# Patient Record
Sex: MALE | Race: WHITE | NOT HISPANIC OR LATINO | Employment: FULL TIME | URBAN - METROPOLITAN AREA
[De-identification: names, ages, dates, MRNs, and addresses within clinical notes are randomized per-mention and may not be internally consistent; named-entity substitution may affect disease eponyms.]

---

## 2017-02-18 ENCOUNTER — ALLSCRIPTS OFFICE VISIT (OUTPATIENT)
Dept: OTHER | Facility: OTHER | Age: 62
End: 2017-02-18

## 2018-01-13 VITALS
HEART RATE: 74 BPM | SYSTOLIC BLOOD PRESSURE: 152 MMHG | TEMPERATURE: 98.4 F | WEIGHT: 252.5 LBS | BODY MASS INDEX: 35.35 KG/M2 | OXYGEN SATURATION: 95 % | HEIGHT: 71 IN | DIASTOLIC BLOOD PRESSURE: 82 MMHG

## 2018-06-15 ENCOUNTER — TELEPHONE (OUTPATIENT)
Dept: FAMILY MEDICINE CLINIC | Facility: CLINIC | Age: 63
End: 2018-06-15

## 2018-06-15 DIAGNOSIS — I10 ESSENTIAL HYPERTENSION: Primary | ICD-10-CM

## 2018-06-15 RX ORDER — VALSARTAN AND HYDROCHLOROTHIAZIDE 320; 25 MG/1; MG/1
TABLET, FILM COATED ORAL
Qty: 90 TABLET | Refills: 2 | Status: SHIPPED | OUTPATIENT
Start: 2018-06-15 | End: 2018-10-03 | Stop reason: DRUGHIGH

## 2018-06-15 NOTE — TELEPHONE ENCOUNTER
MARIANGEL HAS NOT BEEN SEEN IN OVER 1 YEAR, PHARMACY IS REQUESTING VALSARTAN-HCTZ [phone] 1 DAILY   DO YOU WANT TO FILL, SEND TO [facility]

## 2018-07-16 ENCOUNTER — OFFICE VISIT (OUTPATIENT)
Dept: FAMILY MEDICINE CLINIC | Facility: CLINIC | Age: 63
End: 2018-07-16
Payer: COMMERCIAL

## 2018-07-16 VITALS
DIASTOLIC BLOOD PRESSURE: 80 MMHG | WEIGHT: 252 LBS | HEIGHT: 70 IN | TEMPERATURE: 99.5 F | SYSTOLIC BLOOD PRESSURE: 120 MMHG | BODY MASS INDEX: 36.08 KG/M2 | OXYGEN SATURATION: 93 % | HEART RATE: 70 BPM

## 2018-07-16 DIAGNOSIS — J20.8 ACUTE BRONCHITIS DUE TO OTHER SPECIFIED ORGANISMS: ICD-10-CM

## 2018-07-16 DIAGNOSIS — J01.00 ACUTE NON-RECURRENT MAXILLARY SINUSITIS: Primary | ICD-10-CM

## 2018-07-16 PROCEDURE — 3008F BODY MASS INDEX DOCD: CPT | Performed by: FAMILY MEDICINE

## 2018-07-16 PROCEDURE — 99214 OFFICE O/P EST MOD 30 MIN: CPT | Performed by: FAMILY MEDICINE

## 2018-07-16 RX ORDER — IBUPROFEN 200 MG
TABLET ORAL EVERY 6 HOURS PRN
COMMUNITY
End: 2018-10-22 | Stop reason: ALTCHOICE

## 2018-07-16 RX ORDER — PROMETHAZINE HYDROCHLORIDE AND CODEINE PHOSPHATE 6.25; 1 MG/5ML; MG/5ML
5 SYRUP ORAL EVERY 4 HOURS PRN
Qty: 180 ML | Refills: 0 | Status: SHIPPED | OUTPATIENT
Start: 2018-07-16 | End: 2018-10-22 | Stop reason: ALTCHOICE

## 2018-07-16 RX ORDER — PROMETHAZINE HYDROCHLORIDE AND CODEINE PHOSPHATE 6.25; 1 MG/5ML; MG/5ML
5 SYRUP ORAL 4 TIMES DAILY PRN
COMMUNITY
Start: 2017-02-18 | End: 2018-07-16 | Stop reason: SDUPTHER

## 2018-07-16 RX ORDER — PREDNISONE 10 MG/1
TABLET ORAL
COMMUNITY
Start: 2017-02-18 | End: 2018-07-16

## 2018-07-16 RX ORDER — AMOXICILLIN AND CLAVULANATE POTASSIUM 875; 125 MG/1; MG/1
1 TABLET, FILM COATED ORAL 2 TIMES DAILY
COMMUNITY
Start: 2017-02-18 | End: 2018-07-16

## 2018-07-16 RX ORDER — LEVOFLOXACIN 500 MG/1
500 TABLET, FILM COATED ORAL EVERY 24 HOURS
Qty: 10 TABLET | Refills: 0 | Status: SHIPPED | OUTPATIENT
Start: 2018-07-16 | End: 2018-07-26

## 2018-07-16 NOTE — PATIENT INSTRUCTIONS
Acute Bronchitis   WHAT YOU NEED TO KNOW:   Acute bronchitis is swelling and irritation in the air passages of your lungs  This irritation may cause you to cough or have other breathing problems  Acute bronchitis often starts because of another illness, such as a cold or the flu  The illness spreads from your nose and throat to your windpipe and airways  Bronchitis is often called a chest cold  Acute bronchitis lasts about 3 to 6 weeks and is usually not a serious illness  Your cough can last for several weeks  DISCHARGE INSTRUCTIONS:   Return to the emergency department if:   · You cough up blood  · Your lips or fingernails turn blue  · You feel like you are not getting enough air when you breathe  Contact your healthcare provider if:   · You have a fever  · Your breathing problems do not go away or get worse  · Your cough does not get better within 4 weeks  · You have questions or concerns about your condition or care  Self-care:   · Get more rest   Rest helps your body to heal  Slowly start to do more each day  Rest when you feel it is needed  · Avoid irritants in the air  Avoid chemicals, fumes, and dust  Wear a face mask if you must work around dust or fumes  Stay inside on days when air pollution levels are high  If you have allergies, stay inside when pollen counts are high  Do not use aerosol products, such as spray-on deodorant, bug spray, and hair spray  · Do not smoke or be around others who smoke  Nicotine and other chemicals in cigarettes and cigars damages the cilia that move mucus out of your lungs  Ask your healthcare provider for information if you currently smoke and need help to quit  E-cigarettes or smokeless tobacco still contain nicotine  Talk to your healthcare provider before you use these products  · Drink liquids as directed  Liquids help keep your air passages moist and help you cough up mucus   You may need to drink more liquids when you have acute bronchitis  Ask how much liquid to drink each day and which liquids are best for you  · Use a humidifier or vaporizer  Use a cool mist humidifier or a vaporizer to increase air moisture in your home  This may make it easier for you to breathe and help decrease your cough  Decrease risk for acute bronchitis:   · Get the vaccinations you need  Ask your healthcare provider if you should get vaccinated against the flu or pneumonia  · Prevent the spread of germs  You can decrease your risk of acute bronchitis and other illnesses by doing the following:     [facility] your hands often with soap and water  Carry germ-killing hand lotion or gel with you  You can use the lotion or gel to clean your hands when soap and water are not available  ¨ Do not touch your eyes, nose, or mouth unless you have washed your hands first     ¨ Always cover your mouth when you cough to prevent the spread of germs  It is best to cough into a tissue or your shirt sleeve instead of into your hand  Ask those around you cover their mouths when they cough  ¨ Try to avoid people who have a cold or the flu  If you are sick, stay away from others as much as possible  Medicines: Your healthcare provider may  give you any of the following:  · Ibuprofen or acetaminophen  are medicines that help lower your fever  They are available without a doctor's order  Ask your healthcare provider which medicine is right for you  Ask how much to take and how often to take it  Follow directions  These medicines can cause stomach bleeding if not taken correctly  Ibuprofen can cause kidney damage  Do not take ibuprofen if you have kidney disease, an ulcer, or allergies to aspirin  Acetaminophen can cause liver damage  Do not take more than 4,000 milligrams in 24 hours  · Decongestants  help loosen mucus in your lungs and make it easier to cough up  This can help you breathe easier  · Cough suppressants  decrease your urge to cough   If your cough produces mucus, do not take a cough suppressant unless your healthcare provider tells you to  Your healthcare provider may suggest that you take a cough suppressant at night so you can rest     · Inhalers  may be given  Your healthcare provider may give you one or more inhalers to help you breathe easier and cough less  An inhaler gives your medicine to open your airways  Ask your healthcare provider to show you how to use your inhaler correctly  · Take your medicine as directed  Contact your healthcare provider if you think your medicine is not helping or if you have side effects  Tell him of her if you are allergic to any medicine  Keep a list of the medicines, vitamins, and herbs you take  Include the amounts, and when and why you take them  Bring the list or the pill bottles to follow-up visits  Carry your medicine list with you in case of an emergency  Follow up with your healthcare provider as directed:  Write down questions you have so you will remember to ask them during your follow-up visits  © 2017 [employer] Information is for End User's use only and may not be sold, redistributed or otherwise used for commercial purposes  All illustrations and images included in CareNotes® are the copyrighted property of [employer]  or Bradley Sandhu  The above information is an  only  It is not intended as medical advice for individual conditions or treatments  Talk to your doctor, nurse or pharmacist before following any medical regimen to see if it is safe and effective for you

## 2018-07-16 NOTE — PROGRESS NOTES
Assessment/Plan:    No problem-specific Assessment & Plan notes found for this encounter  Diagnoses and all orders for this visit:    Acute non-recurrent maxillary sinusitis  Acute bronchitis due to other specified organisms  -     levofloxacin (LEVAQUIN) 500 mg tablet; Take 1 tablet (500 mg total) by mouth every 24 hours for 10 days  -     promethazine-codeine (PHENERGAN WITH CODEINE) 6 25-10 mg/5 mL syrup; Take 5 mL by mouth every 4 (four) hours as needed for cough    At this point the patient has symptoms of both bronchitis and sinusitis and initially I did think I heard some rales at the right lung base  The seem to clear as he took further breaths but given the persistent fevers and cough I am going to treat him with an antibiotic that would cover pneumonia  If his symptoms persist beyond another day or 2 I would want to get a chest x-ray  I did prescribe cough syrup for him as well  Subjective:   Chief Complaint   Patient presents with    Cold Like Symptoms     pt c/o cough and fever since Friday        Patient ID: Nita Boeck is a 58 y o  male      The pt is here because he has been sick for 5 days  Had a high fever Thursday night  Was not coughing much at that time  Woke around 2am and his fever had broke - he was sweating, the bed was soaking wet  Felt pretty good the next day until the cough started  Had a fever Friday night as well   He has a little sinus pain/pressure  The right ear is crackling and ringing at times  + cough, some in fits  minimal nasal congestion  + headaches  + PND  he feels hoarse but not really sore throat, nothing major  He feels it is mostly because of coughing  He is SOB when he coughs  Abd muscles hurt he has been coughing so much              The following portions of the patient's history were reviewed and updated as appropriate: allergies, current medications, past family history, past medical history, past social history, past surgical history and problem list     Review of Systems      Objective:  Vitals:    07/16/18 1546   BP: 120/80   Pulse: 70   Temp: 99 5 °F (37 5 °C)   SpO2: 93%   Weight: 114 kg (252 lb)   Height: 5' 10" (1 778 m)      Physical Exam   Constitutional: He is oriented to person, place, and time  He appears well-developed and well-nourished  No distress  HENT:   Head: Normocephalic and atraumatic  Right Ear: External ear normal    Left Ear: External ear normal    Mouth/Throat: Oropharynx is clear and moist  No oropharyngeal exudate  Eyes: Conjunctivae are normal    Neck: Normal range of motion  Neck supple  Cardiovascular: Normal rate  Pulmonary/Chest: Effort normal    Lymphadenopathy:     He has cervical adenopathy  Neurological: He is alert and oriented to person, place, and time  Skin: Skin is warm and dry  No rash noted  He is not diaphoretic  No erythema  Psychiatric: He has a normal mood and affect

## 2018-10-03 ENCOUNTER — OFFICE VISIT (OUTPATIENT)
Dept: FAMILY MEDICINE CLINIC | Facility: CLINIC | Age: 63
End: 2018-10-03
Payer: COMMERCIAL

## 2018-10-03 VITALS
DIASTOLIC BLOOD PRESSURE: 78 MMHG | WEIGHT: 236 LBS | HEART RATE: 73 BPM | BODY MASS INDEX: 33.79 KG/M2 | OXYGEN SATURATION: 96 % | SYSTOLIC BLOOD PRESSURE: 128 MMHG | TEMPERATURE: 97.9 F | HEIGHT: 70 IN

## 2018-10-03 DIAGNOSIS — Z11.59 NEED FOR HEPATITIS C SCREENING TEST: ICD-10-CM

## 2018-10-03 DIAGNOSIS — Z12.5 PROSTATE CANCER SCREENING: ICD-10-CM

## 2018-10-03 DIAGNOSIS — I10 ESSENTIAL HYPERTENSION: Primary | ICD-10-CM

## 2018-10-03 DIAGNOSIS — Z12.11 SCREENING FOR COLON CANCER: ICD-10-CM

## 2018-10-03 DIAGNOSIS — E78.2 MIXED HYPERLIPIDEMIA: ICD-10-CM

## 2018-10-03 PROCEDURE — 99214 OFFICE O/P EST MOD 30 MIN: CPT | Performed by: FAMILY MEDICINE

## 2018-10-03 RX ORDER — OLMESARTAN MEDOXOMIL AND HYDROCHLOROTHIAZIDE 40/25 40; 25 MG/1; MG/1
1 TABLET ORAL DAILY
Qty: 90 TABLET | Refills: 1 | Status: SHIPPED | OUTPATIENT
Start: 2018-10-03 | End: 2018-10-22 | Stop reason: ALTCHOICE

## 2018-10-03 NOTE — PATIENT INSTRUCTIONS

## 2018-10-03 NOTE — PROGRESS NOTES
Subjective:   Chief Complaint   Patient presents with    Blood Pressure Check     pt here for f/u on blood pressure, pt also c/o ringing in his rt ear        Patient ID: Karolyn Cleaning is a 58 y o  male  Patient is here for follow-up regarding his blood pressure  He has missed his dose last 2 days  Normally his blood pressure is under good control  He has been following in no brat diet and has lost 16 lb  He had some questions about previous exposure to a partner with hepatitis C  The following portions of the patient's history were reviewed and updated as appropriate: allergies, current medications, past family history, past medical history, past social history, past surgical history and problem list     Review of Systems   Constitutional: Negative for activity change, appetite change, chills, diaphoresis, fatigue and unexpected weight change  HENT: Positive for tinnitus  Negative for congestion, ear discharge, ear pain, hearing loss, nosebleeds and rhinorrhea  Eyes: Negative for pain, redness, itching and visual disturbance  Respiratory: Negative for cough, choking, chest tightness and shortness of breath  Cardiovascular: Negative for chest pain and leg swelling  Gastrointestinal: Negative for abdominal pain, blood in stool, constipation, diarrhea and nausea  Endocrine: Negative for cold intolerance, polydipsia and polyphagia  Genitourinary: Negative for dysuria, frequency, hematuria and urgency  Musculoskeletal: Positive for arthralgias  Negative for back pain, gait problem, joint swelling, neck pain and neck stiffness  Right knee pain   Skin: Negative for color change and rash  Allergic/Immunologic: Negative for environmental allergies and food allergies  Neurological: Negative for dizziness, tremors, seizures, speech difficulty, numbness and headaches  Hematological: Negative for adenopathy  Does not bruise/bleed easily     Psychiatric/Behavioral: Negative for behavioral problems, dysphoric mood, hallucinations and self-injury  Objective:  Vitals:    10/03/18 1030   BP: 150/100   Pulse: 73   Temp: 97 9 °F (36 6 °C)   SpO2: 96%   Weight: 107 kg (236 lb)   Height: 5' 10" (1 778 m)      Physical Exam   Constitutional: He is oriented to person, place, and time  He appears well-developed and well-nourished  No distress  HENT:   Head: Normocephalic and atraumatic  Right Ear: External ear normal    Left Ear: External ear normal    Nose: Nose normal    Mouth/Throat: Oropharynx is clear and moist  No oropharyngeal exudate  Eyes: Pupils are equal, round, and reactive to light  Conjunctivae and EOM are normal  Right eye exhibits no discharge  Left eye exhibits no discharge  No scleral icterus  Neck: Normal range of motion  Neck supple  No thyromegaly present  Cardiovascular: Normal rate, regular rhythm and normal heart sounds  No murmur heard  Pulmonary/Chest: Effort normal and breath sounds normal  He has no wheezes  He has no rales  Abdominal: Soft  Bowel sounds are normal  He exhibits no mass  There is no tenderness  There is no guarding  Musculoskeletal: Normal range of motion  He exhibits no edema or tenderness  Lymphadenopathy:     He has no cervical adenopathy  Neurological: He is alert and oriented to person, place, and time  He has normal reflexes  Skin: Skin is warm and dry  He is not diaphoretic  Psychiatric: He has a normal mood and affect  Judgment and thought content normal          Assessment/Plan:    No problem-specific Assessment & Plan notes found for this encounter  Diagnoses and all orders for this visit:    Essential hypertension  -     CBC; Future  -     Comprehensive metabolic panel; Future  -     TSH, 3rd generation with Free T4 reflex; Future  -     CBC  -     Comprehensive metabolic panel  -     TSH, 3rd generation with Free T4 reflex  -     olmesartan-hydrochlorothiazide (BENICAR HCT) 40-25 MG per tablet;  Take 1 tablet by mouth daily    Screening for colon cancer  -     Ambulatory referral to Gastroenterology; Future    Mixed hyperlipidemia  -     Lipid panel; Future  -     Lipid panel    Prostate cancer screening  -     PSA Total, Diagnostic; Future  -     PSA Total, Diagnostic    Need for hepatitis C screening test  -     Hepatitis C antibody; Future  -     Hepatitis C antibody         I have encouraged the patient to change his blood pressure medicine to Benicar HC T  We will send a new prescription in for him  In the meantime he will get the lab have his blood work to include hepatitis-C screening    Recheck in see me again in 3 months

## 2018-10-22 ENCOUNTER — OFFICE VISIT (OUTPATIENT)
Dept: FAMILY MEDICINE CLINIC | Facility: CLINIC | Age: 63
End: 2018-10-22
Payer: COMMERCIAL

## 2018-10-22 VITALS
HEIGHT: 70 IN | BODY MASS INDEX: 31.42 KG/M2 | TEMPERATURE: 98.7 F | OXYGEN SATURATION: 98 % | DIASTOLIC BLOOD PRESSURE: 64 MMHG | SYSTOLIC BLOOD PRESSURE: 118 MMHG | HEART RATE: 79 BPM | WEIGHT: 219.5 LBS

## 2018-10-22 DIAGNOSIS — E11.9 TYPE 2 DIABETES MELLITUS WITHOUT COMPLICATION, WITHOUT LONG-TERM CURRENT USE OF INSULIN (HCC): ICD-10-CM

## 2018-10-22 DIAGNOSIS — E11.65 UNCONTROLLED TYPE 2 DIABETES MELLITUS WITH HYPERGLYCEMIA (HCC): Primary | ICD-10-CM

## 2018-10-22 DIAGNOSIS — I10 BENIGN ESSENTIAL HYPERTENSION: ICD-10-CM

## 2018-10-22 DIAGNOSIS — R35.89 POLYURIA: ICD-10-CM

## 2018-10-22 DIAGNOSIS — Z13.0 SCREENING FOR IRON DEFICIENCY ANEMIA: ICD-10-CM

## 2018-10-22 DIAGNOSIS — R63.1 POLYDIPSIA: ICD-10-CM

## 2018-10-22 DIAGNOSIS — Z13.29 SCREENING FOR THYROID DISORDER: ICD-10-CM

## 2018-10-22 DIAGNOSIS — Z12.5 PROSTATE CANCER SCREENING: ICD-10-CM

## 2018-10-22 DIAGNOSIS — Z13.220 SCREENING FOR LIPID DISORDERS: ICD-10-CM

## 2018-10-22 DIAGNOSIS — G44.229 CHRONIC TENSION-TYPE HEADACHE, NOT INTRACTABLE: ICD-10-CM

## 2018-10-22 DIAGNOSIS — Z11.59 NEED FOR HEPATITIS C SCREENING TEST: ICD-10-CM

## 2018-10-22 DIAGNOSIS — Z13.29 SCREENING FOR ENDOCRINE DISORDER: ICD-10-CM

## 2018-10-22 LAB — SL AMB POCT HEMOGLOBIN AIC: 12.9

## 2018-10-22 PROCEDURE — 3046F HEMOGLOBIN A1C LEVEL >9.0%: CPT | Performed by: FAMILY MEDICINE

## 2018-10-22 PROCEDURE — 99214 OFFICE O/P EST MOD 30 MIN: CPT | Performed by: FAMILY MEDICINE

## 2018-10-22 PROCEDURE — 83036 HEMOGLOBIN GLYCOSYLATED A1C: CPT | Performed by: FAMILY MEDICINE

## 2018-10-22 RX ORDER — BLOOD-GLUCOSE METER
1 KIT MISCELLANEOUS
Qty: 1 EACH | Refills: 0 | Status: SHIPPED | OUTPATIENT
Start: 2018-10-22

## 2018-10-22 RX ORDER — LANCETS 28 GAUGE
EACH MISCELLANEOUS
Qty: 100 EACH | Refills: 0 | Status: SHIPPED | OUTPATIENT
Start: 2018-10-22 | End: 2020-10-02 | Stop reason: SDUPTHER

## 2018-10-22 RX ORDER — VALSARTAN AND HYDROCHLOROTHIAZIDE 320; 25 MG/1; MG/1
1 TABLET, FILM COATED ORAL DAILY
Qty: 90 TABLET | Refills: 3 | Status: SHIPPED | OUTPATIENT
Start: 2018-10-22 | End: 2018-11-01 | Stop reason: SDUPTHER

## 2018-10-22 RX ORDER — VALSARTAN AND HYDROCHLOROTHIAZIDE 320; 25 MG/1; MG/1
TABLET, FILM COATED ORAL
COMMUNITY
Start: 2018-10-16 | End: 2018-10-22 | Stop reason: SDUPTHER

## 2018-10-22 NOTE — PROGRESS NOTES
Subjective:   Chief Complaint   Patient presents with    possible diabeties     pt c/o being thristy all the time, dizziness, frequent urination that started a month ago, pt states he stopped eatting bread and drinking 3 months ago        Patient ID: Ean Dupont is a 58 y o  male  Pt here complaining of signs of diabetes  Noticed for about 3 months  He looked it up on the internet and he thinks he has diabetes  His father has diabetes  He has increased urination and frequency, with increased thirst and increased appetite but has lost weight  He has blurry vision  His father has a glucometer but he has not checked his blood sugar  Pt has not had blood work checked since 2015  He has cut out bread over the past 3 months and decreased alcohol intake  He has 1 drink a night and can give that up  He also eats chocolate marshmallow ice cream each night  Drinks alcohol frequently, cranberry juice and vodka  The following portions of the patient's history were reviewed and updated as appropriate: allergies, current medications, past family history, past medical history, past social history, past surgical history and problem list     Review of Systems   Constitutional: Positive for appetite change, fatigue and unexpected weight change  Negative for fever  HENT: Negative  Eyes: Positive for visual disturbance  Respiratory: Negative  Negative for cough  Cardiovascular: Negative  Gastrointestinal: Negative  Endocrine: Positive for polydipsia, polyphagia and polyuria  Genitourinary: Negative  Musculoskeletal: Negative  Skin: Negative  Allergic/Immunologic: Negative  Neurological: Positive for headaches  Psychiatric/Behavioral: Negative  Objective:  Vitals:    10/22/18 0847   BP: 118/64   Pulse: 79   Temp: 98 7 °F (37 1 °C)   SpO2: 98%   Weight: 99 6 kg (219 lb 8 oz)   Height: 5' 10" (1 778 m)      Physical Exam   Constitutional: He is oriented to person, place, and time   He appears well-developed and well-nourished  Obese    HENT:   Head: Normocephalic and atraumatic  Cardiovascular: Normal rate, regular rhythm and normal heart sounds  Pulmonary/Chest: Effort normal and breath sounds normal    Abdominal: Soft  Bowel sounds are normal    Neurological: He is alert and oriented to person, place, and time  Skin: Skin is warm and dry  Psychiatric: He has a normal mood and affect  His behavior is normal  Judgment and thought content normal    Nursing note and vitals reviewed  Assessment/Plan:    No problem-specific Assessment & Plan notes found for this encounter  Diagnoses and all orders for this visit:    Uncontrolled type 2 diabetes mellitus with hyperglycemia ([facility] [address] )  -     metFORMIN (GLUCOPHAGE) 500 mg tablet; Take 1 tablet (500 mg total) by mouth 2 (two) times a day with meals  -     glucose monitoring kit (FREESTYLE) monitoring kit; 1 each by Does not apply route 2 (two) times a day before lunch and dinner E11 65  -     Glucometer test strips  -     Lancets (FREESTYLE) lancets; Use as instructed E11 65 testing twice a day  -     POCT hemoglobin A1c  Newly diagnosed DM type 2: hba1c 12 9 in office today  Pt will start metformin 500mg with breakfast and dinner and observe for any changes in bms  Glucose monitor, checking twice a day, before breakfast and before dinner  Discussed dietary modifications and given carb information  Diabetic classes available- discussed, not interested currently, thinks he can do it on his own  Type 2 diabetes mellitus without complication, without long-term current use of insulin ([facility])  -     metFORMIN (GLUCOPHAGE) 500 mg tablet; Take 1 tablet (500 mg total) by mouth 2 (two) times a day with meals  -     POCT hemoglobin A1c  hba1c done in office today 12 9  Polyuria  -     Cancel: HEMOGLOBIN A1C W/ EAG ESTIMATION;  Future  -     Cancel: HEMOGLOBIN A1C W/ EAG ESTIMATION  Likely symptom of uncontrolled diabetes  Polydipsia  Likely symptom of uncontrolled diabetes  Benign essential hypertension  -     valsartan-hydrochlorothiazide (DIOVAN-HCT) 320-25 MG per tablet;  Take 1 tablet by mouth daily  Controlled: continue current medication , requesting to restart medication  Chronic tension-type headache, not intractable  Likely related to dm type 2 since it is a new onset    Routine blood work drawn today in office:   Prostate cancer screening  -     PSA, total and free    Need for hepatitis C screening test  -     Hepatitis C antibody    Screening for iron deficiency anemia  -     CBC and differential    Screening for thyroid disorder  -     TSH, 3rd generation    Screening for endocrine disorder  -     Comprehensive metabolic panel    Screening for lipid disorders  -     Lipid panel    Other orders

## 2018-10-23 ENCOUNTER — TELEPHONE (OUTPATIENT)
Dept: FAMILY MEDICINE CLINIC | Facility: CLINIC | Age: 63
End: 2018-10-23

## 2018-10-23 LAB
ALBUMIN SERPL-MCNC: 4.4 G/DL (ref 3.6–4.8)
ALBUMIN/GLOB SERPL: 1.4 {RATIO} (ref 1.2–2.2)
ALP SERPL-CCNC: 99 IU/L (ref 39–117)
ALT SERPL-CCNC: 36 IU/L (ref 0–44)
AST SERPL-CCNC: 24 IU/L (ref 0–40)
BASOPHILS # BLD AUTO: 0.1 X10E3/UL (ref 0–0.2)
BASOPHILS NFR BLD AUTO: 1 %
BILIRUB SERPL-MCNC: 1.2 MG/DL (ref 0–1.2)
BUN SERPL-MCNC: 23 MG/DL (ref 8–27)
BUN/CREAT SERPL: 18 (ref 10–24)
CALCIUM SERPL-MCNC: 10.3 MG/DL (ref 8.6–10.2)
CHLORIDE SERPL-SCNC: 87 MMOL/L (ref 96–106)
CHOLEST SERPL-MCNC: 193 MG/DL (ref 100–199)
CHOLEST/HDLC SERPL: 5.5 RATIO (ref 0–5)
CO2 SERPL-SCNC: 21 MMOL/L (ref 20–29)
CREAT SERPL-MCNC: 1.29 MG/DL (ref 0.76–1.27)
EOSINOPHIL # BLD AUTO: 0.1 X10E3/UL (ref 0–0.4)
EOSINOPHIL NFR BLD AUTO: 1 %
ERYTHROCYTE [DISTWIDTH] IN BLOOD BY AUTOMATED COUNT: 12.4 % (ref 12.3–15.4)
GLOBULIN SER-MCNC: 3.2 G/DL (ref 1.5–4.5)
GLUCOSE SERPL-MCNC: 592 MG/DL (ref 65–99)
HCT VFR BLD AUTO: 49.4 % (ref 37.5–51)
HCV AB S/CO SERPL IA: <0.1 S/CO RATIO (ref 0–0.9)
HDLC SERPL-MCNC: 35 MG/DL
HGB BLD-MCNC: 17.1 G/DL (ref 13–17.7)
IMM GRANULOCYTES # BLD: 0 X10E3/UL (ref 0–0.1)
IMM GRANULOCYTES NFR BLD: 0 %
LDLC SERPL CALC-MCNC: 95 MG/DL (ref 0–99)
LDLC SERPL DIRECT ASSAY-MCNC: 128 MG/DL (ref 0–99)
LYMPHOCYTES # BLD AUTO: 1.8 X10E3/UL (ref 0.7–3.1)
LYMPHOCYTES NFR BLD AUTO: 20 %
MCH RBC QN AUTO: 32.4 PG (ref 26.6–33)
MCHC RBC AUTO-ENTMCNC: 34.6 G/DL (ref 31.5–35.7)
MCV RBC AUTO: 94 FL (ref 79–97)
MONOCYTES # BLD AUTO: 0.5 X10E3/UL (ref 0.1–0.9)
MONOCYTES NFR BLD AUTO: 5 %
NEUTROPHILS # BLD AUTO: 6.5 X10E3/UL (ref 1.4–7)
NEUTROPHILS NFR BLD AUTO: 73 %
PLATELET # BLD AUTO: 169 X10E3/UL (ref 150–379)
POTASSIUM SERPL-SCNC: 5.1 MMOL/L (ref 3.5–5.2)
PROT SERPL-MCNC: 7.6 G/DL (ref 6–8.5)
PSA FREE MFR SERPL: 30.4 %
PSA FREE SERPL-MCNC: 0.85 NG/ML
PSA SERPL-MCNC: 2.8 NG/ML (ref 0–4)
RBC # BLD AUTO: 5.28 X10E6/UL (ref 4.14–5.8)
SL AMB EGFR AFRICAN AMERICAN: 67 ML/MIN/1.73
SL AMB EGFR NON AFRICAN AMERICAN: 58 ML/MIN/1.73
SL AMB VLDL CHOLESTEROL CALC: 63 MG/DL (ref 5–40)
SODIUM SERPL-SCNC: 132 MMOL/L (ref 134–144)
TRIGL SERPL-MCNC: 317 MG/DL (ref 0–149)
TSH SERPL DL<=0.005 MIU/L-ACNC: 1.71 UIU/ML (ref 0.45–4.5)
WBC # BLD AUTO: 8.9 X10E3/UL (ref 3.4–10.8)

## 2018-10-23 NOTE — TELEPHONE ENCOUNTER
Start metformin 500mg twice a day and continue to monitor  Watch diet as discussed in office  It has been running high for at least the past 3 months  Will increase on metformin if he is tolerating the medication and if blood sugars are still running high  Call back on Friday am with numbers- check fasting= before breakfast  And before dinner

## 2018-10-23 NOTE — TELEPHONE ENCOUNTER
Catrachita Cheng said he never called last night with that BS reading, he did not have his monitor yet

## 2018-10-23 NOTE — TELEPHONE ENCOUNTER
DR Rachna Del Toro RECEIVED A CALL FROM DEANGELO BRUNSON LAST NIGHT  HIS BS   WANTED TO ALERT YOU TO THIS TO SEE WHAT YOU WANTED TO DO WITH HIM  SHE WILL ALSO SEND YOU A TASK

## 2018-10-23 NOTE — TELEPHONE ENCOUNTER
Received a call from [facility] this am at 7:20 am with blood sugar 595  Report is being faxed to the office

## 2018-10-27 NOTE — TELEPHONE ENCOUNTER
Yes, franchesca handled this  The blood sugar was from the labs drawn in the office at his office visit  Pt is aware of his instructions

## 2018-10-28 ENCOUNTER — TELEPHONE (OUTPATIENT)
Dept: FAMILY MEDICINE CLINIC | Facility: CLINIC | Age: 63
End: 2018-10-28

## 2018-10-29 DIAGNOSIS — E11.65 UNCONTROLLED TYPE 2 DIABETES MELLITUS WITH HYPERGLYCEMIA (HCC): ICD-10-CM

## 2018-10-29 DIAGNOSIS — E11.9 TYPE 2 DIABETES MELLITUS WITHOUT COMPLICATION, WITHOUT LONG-TERM CURRENT USE OF INSULIN (HCC): Primary | ICD-10-CM

## 2018-10-29 DIAGNOSIS — E11.9 TYPE 2 DIABETES MELLITUS WITHOUT COMPLICATION, WITHOUT LONG-TERM CURRENT USE OF INSULIN (HCC): ICD-10-CM

## 2018-10-29 PROBLEM — Z13.220 SCREENING FOR LIPID DISORDERS: Status: RESOLVED | Noted: 2018-10-22 | Resolved: 2018-10-29

## 2018-10-29 PROBLEM — Z13.29 SCREENING FOR ENDOCRINE DISORDER: Status: RESOLVED | Noted: 2018-10-22 | Resolved: 2018-10-29

## 2018-10-29 PROBLEM — Z12.5 PROSTATE CANCER SCREENING: Status: RESOLVED | Noted: 2018-10-22 | Resolved: 2018-10-29

## 2018-10-29 PROBLEM — Z13.0 SCREENING FOR IRON DEFICIENCY ANEMIA: Status: RESOLVED | Noted: 2018-10-22 | Resolved: 2018-10-29

## 2018-10-29 PROBLEM — Z11.59 NEED FOR HEPATITIS C SCREENING TEST: Status: RESOLVED | Noted: 2018-10-22 | Resolved: 2018-10-29

## 2018-10-29 PROBLEM — Z13.29 SCREENING FOR THYROID DISORDER: Status: RESOLVED | Noted: 2018-10-22 | Resolved: 2018-10-29

## 2018-10-29 NOTE — TELEPHONE ENCOUNTER
CAME IN LAST TUESDAY WAS DIAGNOSED WITH DM TYPE 2   AARON SUGAR  RIGHT NOW, WAS TOLD TO CALL IF PAST 5

## 2018-10-29 NOTE — TELEPHONE ENCOUNTER
MARIANGEL SPOKE WITH ON CALL DOC YESTERDAY  BS   SHE RECOMMENDED HE TAKE TWO TABLETS IN THE AM AND TWO TABLETS IN THE PM OF HIS MEDICATION  HE DID NOT CHECK HIS BS THIS AM BUT FEELS GREAT AND IS NOT HAVING ANY SIDE EFFECTS ON THE MEDS  IS THERE A BETTER PILL OR SHOULD HE STAY ON THIS SINCE HE IS TOLERATING IT SO WELL  IS HE ABLE TO GET A QUICK PEN THAT HE CAN USE WHILE TRAVELING AS HE WAS UP IN THE [place] AND DID NOT HAVE HIS MACHINE WITH HIM TO CHECK THIS AM     ALSO WILL NEED A RENEWAL ON HIS MEDICATION SINCE HE DOUBLED IT  I PUT THROUGH IN EPIC AS 1000MG BID, PLEASE CHECK  ADVISE PATIENT

## 2018-10-30 RX ORDER — BLOOD-GLUCOSE METER
KIT MISCELLANEOUS 2 TIMES DAILY
Refills: 0 | COMMUNITY
Start: 2018-10-22 | End: 2018-12-06 | Stop reason: SDUPTHER

## 2018-10-30 RX ORDER — BLOOD-GLUCOSE METER
KIT MISCELLANEOUS
Refills: 0 | COMMUNITY
Start: 2018-10-22 | End: 2020-10-02 | Stop reason: SDUPTHER

## 2018-11-01 ENCOUNTER — OFFICE VISIT (OUTPATIENT)
Dept: FAMILY MEDICINE CLINIC | Facility: CLINIC | Age: 63
End: 2018-11-01
Payer: COMMERCIAL

## 2018-11-01 VITALS
TEMPERATURE: 98.2 F | DIASTOLIC BLOOD PRESSURE: 50 MMHG | WEIGHT: 220 LBS | HEIGHT: 70 IN | SYSTOLIC BLOOD PRESSURE: 90 MMHG | OXYGEN SATURATION: 97 % | HEART RATE: 87 BPM | BODY MASS INDEX: 31.5 KG/M2

## 2018-11-01 DIAGNOSIS — E11.9 TYPE 2 DIABETES MELLITUS WITHOUT COMPLICATION, WITHOUT LONG-TERM CURRENT USE OF INSULIN (HCC): ICD-10-CM

## 2018-11-01 DIAGNOSIS — I10 BENIGN ESSENTIAL HYPERTENSION: ICD-10-CM

## 2018-11-01 DIAGNOSIS — I10 ESSENTIAL HYPERTENSION: ICD-10-CM

## 2018-11-01 DIAGNOSIS — Z12.11 COLON CANCER SCREENING: Primary | ICD-10-CM

## 2018-11-01 DIAGNOSIS — E11.65 UNCONTROLLED TYPE 2 DIABETES MELLITUS WITH HYPERGLYCEMIA (HCC): ICD-10-CM

## 2018-11-01 PROCEDURE — 3078F DIAST BP <80 MM HG: CPT | Performed by: FAMILY MEDICINE

## 2018-11-01 PROCEDURE — 99213 OFFICE O/P EST LOW 20 MIN: CPT | Performed by: FAMILY MEDICINE

## 2018-11-01 PROCEDURE — 3074F SYST BP LT 130 MM HG: CPT | Performed by: FAMILY MEDICINE

## 2018-11-01 RX ORDER — OLMESARTAN MEDOXOMIL AND HYDROCHLOROTHIAZIDE 40/25 40; 25 MG/1; MG/1
TABLET ORAL
Qty: 30 TABLET | Refills: 0 | Status: SHIPPED | COMMUNITY
Start: 2018-11-01 | End: 2018-11-15 | Stop reason: ALTCHOICE

## 2018-11-01 NOTE — PROGRESS NOTES
Subjective:   Chief Complaint   Patient presents with    Blood Sugar Problem     pt here for f/u on diabetes        Patient ID: Ernie Guy is a 58 y o  male  Patient is here for follow-up  Ten days ago was diagnosed with uncontrolled type 2 diabetes  His blood sugar was 592 with hemoglobin A1c of 12 9  He started taking metformin  He is up to 2 tablets twice a day  He notes that his blood sugar has come down and was in the 200s  He is trying to be small with his diet  He is trying to choose his carbohydrates carefully  He notes that his blood pressure has been low and will begin to reduce to half a tablet every day  Blood Sugar Problem   Pertinent negatives include no abdominal pain, arthralgias, chest pain, chills, congestion, coughing, diaphoresis, fatigue, headaches, joint swelling, nausea, neck pain, numbness or rash  The following portions of the patient's history were reviewed and updated as appropriate: allergies, current medications, past family history, past medical history, past social history, past surgical history and problem list     Review of Systems   Constitutional: Negative for activity change, appetite change, chills, diaphoresis, fatigue and unexpected weight change  HENT: Negative for congestion, ear discharge, ear pain, hearing loss, nosebleeds and rhinorrhea  Eyes: Negative for pain, redness, itching and visual disturbance  Respiratory: Negative for cough, choking, chest tightness and shortness of breath  Cardiovascular: Negative for chest pain and leg swelling  Gastrointestinal: Negative for abdominal pain, blood in stool, constipation, diarrhea and nausea  Endocrine: Positive for polydipsia, polyphagia and polyuria  Negative for cold intolerance  Genitourinary: Negative for dysuria, frequency, hematuria and urgency  Musculoskeletal: Negative for arthralgias, back pain, gait problem, joint swelling, neck pain and neck stiffness     Skin: Negative for color change and rash  Allergic/Immunologic: Negative for environmental allergies and food allergies  Neurological: Negative for dizziness, tremors, seizures, speech difficulty, numbness and headaches  Hematological: Negative for adenopathy  Does not bruise/bleed easily  Psychiatric/Behavioral: Negative for behavioral problems, dysphoric mood, hallucinations and self-injury  Objective:  Vitals:    11/01/18 1221   BP: 90/50   Pulse: 87   Temp: 98 2 °F (36 8 °C)   SpO2: 97%   Weight: 99 8 kg (220 lb)   Height: 5' 10" (1 778 m)      Physical Exam   Constitutional: He is oriented to person, place, and time  He appears well-developed and well-nourished  No distress  HENT:   Head: Normocephalic and atraumatic  Right Ear: External ear normal    Left Ear: External ear normal    Nose: Nose normal    Mouth/Throat: Oropharynx is clear and moist  No oropharyngeal exudate  Eyes: Pupils are equal, round, and reactive to light  Conjunctivae and EOM are normal  Right eye exhibits no discharge  Left eye exhibits no discharge  No scleral icterus  Neck: Normal range of motion  Neck supple  No thyromegaly present  Cardiovascular: Normal rate, regular rhythm and normal heart sounds  No murmur heard  Pulmonary/Chest: Effort normal and breath sounds normal  He has no wheezes  He has no rales  Abdominal: Soft  Bowel sounds are normal  He exhibits no mass  There is no tenderness  There is no guarding  Musculoskeletal: Normal range of motion  He exhibits no edema or tenderness  Lymphadenopathy:     He has no cervical adenopathy  Neurological: He is alert and oriented to person, place, and time  He has normal reflexes  Skin: Skin is warm and dry  He is not diaphoretic  Psychiatric: He has a normal mood and affect  Judgment and thought content normal          Assessment/Plan:    No problem-specific Assessment & Plan notes found for this encounter         Diagnoses and all orders for this visit:    Colon cancer screening  -     Ambulatory referral to Gastroenterology; Future    Type 2 diabetes mellitus without complication, without long-term current use of insulin (HCC)    Uncontrolled type 2 diabetes mellitus with hyperglycemia (HCC)    Essential hypertension  -     olmesartan-hydrochlorothiazide (BENICAR HCT) 40-25 MG per tablet; takr 1/2 tab daily    Benign essential hypertension        He will take half a tablet of his new blood pressure medication every day  Continue metformin 2 tablets twice a day and see me in 2 weeks  If the sugars are still elevated I would add some glimepiride

## 2018-11-01 NOTE — PATIENT INSTRUCTIONS
Diabetes and Your Skin   WHAT YOU NEED TO KNOW:   Diabetes can affect every part of your body, including your skin  Diabetes that is not well controlled can damage blood vessels and nerves  Damage to blood vessels can make it hard for blood to flow to tissues and organs  A lack of blood flow to your skin can cause ulcers that are difficult to heal  Skin ulcers are also called sores  People with diabetes can also have more bacterial skin infections than other people  Most skin conditions can be prevented with good blood sugar control  Skin sores can be hard to heal, or become life or limb-threatening, if not treated early  DISCHARGE INSTRUCTIONS:   Contact your healthcare provider if:   · You get a severe burn or cut  · You have a sore that is painful, warm to the touch, or has redness around it  · Your sore does not get better or seems to get worse  · You have a fever  · Your blood sugar levels continue to be higher than they should be  Prevent skin sores:   · Keep your blood sugars within target range  Your healthcare provider will tell you what your blood sugar levels should be  High blood sugar levels increase your risk for skin infections and poor wound healing  · Keep your skin clean  Do not take hot baths or showers  They can cause your skin to get dry  Do not take a bubble bath if you have dry skin  Use moisturizing soaps and lotion after baths or showers  Do not put lotion between your toes  · Keep your skin from becoming too dry,  especially in cold, dry weather  When you scratch dry, itchy skin, you can cause your skin to be open to infection  Bathe less during cold weather and use lotion to moisturize  Use a humidifier to keep air in your home from being dry  · Keep areas where skin touches skin dry  Use talcum powder in areas such as armpits and groin  You may also need it under your breasts, and between your toes  Moisture in these areas can cause a fungal infection  · Treat cuts immediately  Clean minor cuts with soap and water  Cover them with sterile gauze  © 2017 [address] [employer] Information is for End User's use only and may not be sold, redistributed or otherwise used for commercial purposes  All illustrations and images included in CareNotes® are the copyrighted property of [employer]  or Bradley Sandhu  The above information is an  only  It is not intended as medical advice for individual conditions or treatments  Talk to your doctor, nurse or pharmacist before following any medical regimen to see if it is safe and effective for you

## 2018-11-14 LAB
HBA1C MFR BLD: 13.5 % (ref 4.8–5.6)
LABCORP COMMENT: NORMAL

## 2018-11-15 ENCOUNTER — OFFICE VISIT (OUTPATIENT)
Dept: FAMILY MEDICINE CLINIC | Facility: CLINIC | Age: 63
End: 2018-11-15

## 2018-11-15 VITALS
BODY MASS INDEX: 32.21 KG/M2 | OXYGEN SATURATION: 98 % | HEART RATE: 62 BPM | SYSTOLIC BLOOD PRESSURE: 148 MMHG | HEIGHT: 70 IN | WEIGHT: 225 LBS | TEMPERATURE: 97.9 F | DIASTOLIC BLOOD PRESSURE: 92 MMHG

## 2018-11-15 DIAGNOSIS — E11.9 TYPE 2 DIABETES MELLITUS WITHOUT COMPLICATION, WITHOUT LONG-TERM CURRENT USE OF INSULIN (HCC): Primary | ICD-10-CM

## 2018-11-15 DIAGNOSIS — I10 ESSENTIAL HYPERTENSION: ICD-10-CM

## 2018-11-15 PROCEDURE — 3008F BODY MASS INDEX DOCD: CPT | Performed by: FAMILY MEDICINE

## 2018-11-15 PROCEDURE — 99213 OFFICE O/P EST LOW 20 MIN: CPT | Performed by: FAMILY MEDICINE

## 2018-11-15 PROCEDURE — 1036F TOBACCO NON-USER: CPT | Performed by: FAMILY MEDICINE

## 2018-11-15 RX ORDER — VALSARTAN AND HYDROCHLOROTHIAZIDE 160; 12.5 MG/1; MG/1
1 TABLET, FILM COATED ORAL DAILY
Qty: 30 TABLET | Refills: 5 | Status: SHIPPED | OUTPATIENT
Start: 2018-11-15 | End: 2019-06-10 | Stop reason: SDUPTHER

## 2018-11-15 RX ORDER — VALSARTAN AND HYDROCHLOROTHIAZIDE 320; 25 MG/1; MG/1
TABLET, FILM COATED ORAL
COMMUNITY
Start: 2018-11-11 | End: 2018-11-15 | Stop reason: ALTCHOICE

## 2018-11-15 RX ORDER — GLIMEPIRIDE 1 MG/1
1 TABLET ORAL
Qty: 30 TABLET | Refills: 5 | Status: SHIPPED | OUTPATIENT
Start: 2018-11-15 | End: 2019-06-10 | Stop reason: SDUPTHER

## 2018-11-15 NOTE — PROGRESS NOTES
Subjective:   Chief Complaint   Patient presents with    Follow-up     PT IS HERE FOR FOLLOW UP TO HIS DM AND BP CHECK  Patient ID: Kanwal Curry is a 58 y o  male  PATIENT IS HERE FOR FOLLOW-UP  HIS BLOOD SUGAR CONTINUES TO RUN AS A FASTING AROUND 200  HE NOTES LESS NOCTURIA  HIS WEIGHT HAS CLIMBED SEVERAL LB SINCE A RECENT VACATION TO HELP MED  HIS BLOOD PRESSURE IS ELEVATED BECAUSE HE STOPPED TAKING HIS MEDICINE ENTIRELY  I HAD SUGGESTED A HALF DOSE OF HIS ORIGINAL VALSARTAN HYDROCHLOROTHIAZIDE ALTHOUGH HE DECIDED TO STOP IT  CLEARLY HE NEEDS SOMETHING  The following portions of the patient's history were reviewed and updated as appropriate: allergies, current medications, past family history, past medical history, past social history, past surgical history and problem list     Review of Systems   Constitutional: Negative for activity change, appetite change, chills, diaphoresis, fatigue and unexpected weight change  HENT: Negative for congestion, ear discharge, ear pain, hearing loss, nosebleeds and rhinorrhea  Eyes: Negative for pain, redness, itching and visual disturbance  Respiratory: Negative for cough, choking, chest tightness and shortness of breath  Cardiovascular: Negative for chest pain and leg swelling  Gastrointestinal: Negative for abdominal pain, blood in stool, constipation, diarrhea and nausea  Endocrine: Negative for cold intolerance, polydipsia and polyphagia  Genitourinary: Negative for dysuria, frequency, hematuria and urgency  Musculoskeletal: Negative for arthralgias, back pain, gait problem, joint swelling, neck pain and neck stiffness  Skin: Negative for color change and rash  Allergic/Immunologic: Negative for environmental allergies and food allergies  Neurological: Negative for dizziness, tremors, seizures, speech difficulty, numbness and headaches  Hematological: Negative for adenopathy  Does not bruise/bleed easily  Psychiatric/Behavioral: Negative for behavioral problems, dysphoric mood, hallucinations and self-injury  Objective:  Vitals:    11/15/18 1411   BP: 148/92   Pulse: 62   Temp: 97 9 °F (36 6 °C)   SpO2: 98%   Weight: 102 kg (225 lb)   Height: 5' 10" (1 778 m)      Physical Exam   Constitutional: He is oriented to person, place, and time  He appears well-developed and well-nourished  No distress  HENT:   Head: Normocephalic and atraumatic  Right Ear: External ear normal    Left Ear: External ear normal    Nose: Nose normal    Mouth/Throat: Oropharynx is clear and moist  No oropharyngeal exudate  Eyes: Pupils are equal, round, and reactive to light  Conjunctivae and EOM are normal  Right eye exhibits no discharge  Left eye exhibits no discharge  No scleral icterus  Neck: Normal range of motion  Neck supple  No thyromegaly present  Cardiovascular: Normal rate, regular rhythm and normal heart sounds  Pulses are no weak pulses  No murmur heard  Pulses:       Dorsalis pedis pulses are 1+ on the right side, and 2+ on the left side  Posterior tibial pulses are 1+ on the right side, and 1+ on the left side  Pulmonary/Chest: Effort normal and breath sounds normal  He has no wheezes  He has no rales  Abdominal: Soft  Bowel sounds are normal  He exhibits no mass  There is no tenderness  There is no guarding  Musculoskeletal: Normal range of motion  He exhibits no edema or tenderness  Feet:   Right Foot:   Skin Integrity: Negative for ulcer, skin breakdown, erythema, warmth, callus or dry skin  Left Foot:   Skin Integrity: Negative for ulcer, skin breakdown, erythema, warmth, callus or dry skin  Lymphadenopathy:     He has no cervical adenopathy  Neurological: He is alert and oriented to person, place, and time  He has normal reflexes  Skin: Skin is warm and dry  He is not diaphoretic  Psychiatric: He has a normal mood and affect   Judgment and thought content normal  Patient's shoes and socks removed  Right Foot/Ankle   Right Foot Inspection  Skin Exam: skin normal and skin intact no dry skin, no warmth, no callus, no erythema, no maceration, no abnormal color, no pre-ulcer, no ulcer and no callus                          Toe Exam: ROM and strength within normal limits  Sensory   Vibration: intact  Proprioception: intact   Monofilament testing: intact  Vascular  Capillary refills: < 3 seconds  The right DP pulse is 1+  The right PT pulse is 1+  Left Foot/Ankle  Left Foot Inspection  Skin Exam: skin normal and skin intactno dry skin, no warmth, no erythema, no maceration, normal color, no pre-ulcer, no ulcer and no callus                         Toe Exam: ROM and strength within normal limits                   Sensory   Vibration: intact  Proprioception: intact  Monofilament: intact  Vascular  Capillary refills: < 3 seconds  The left DP pulse is 2+  The left PT pulse is 1+  Assign Risk Category:  No deformity present; No loss of protective sensation; No weak pulses       Risk: 0  Assessment/Plan:    No problem-specific Assessment & Plan notes found for this encounter  Diagnoses and all orders for this visit:    Type 2 diabetes mellitus without complication, without long-term current use of insulin (HCC)  -     glimepiride (AMARYL) 1 mg tablet; Take 1 tablet (1 mg total) by mouth daily with breakfast    Essential hypertension  -     valsartan-hydrochlorothiazide (DIOVAN-HCT) 160-12 5 MG per tablet; Take 1 tablet by mouth daily    Other orders  -     Glucose Blood (FREESTYLE LITE TEST VI);   -     Discontinue: valsartan-hydrochlorothiazide (DIOVAN-HCT) 320-25 MG per tablet;         ADD GLIMEPIRIDE AND RESTART VALSARTAN HYDROCHLOROTHIAZIDE AT HALF THE ORIGINAL DOSE    RECHECK WITH ME IN 2 WEEKS WITH READINGS

## 2018-11-15 NOTE — PATIENT INSTRUCTIONS
Diabetic Hyperglycemia   WHAT YOU NEED TO KNOW:   Diabetic hyperglycemia is a blood glucose (sugar) level that is higher than your healthcare provider recommends  You may have increased thirst and urinate more often than usual  Over time, uncontrolled diabetes can damage your nerves, blood vessels, tissues, and organs  That is why it is important to manage diabetic hyperglycemia  Without treatment, diabetic hyperglycemia can lead to diabetic ketoacidosis (DKA) or hyperglycemic hyperosmolar state (HHS)  These are serious conditions that can become life-threatening  DISCHARGE INSTRUCTIONS:   Return to the emergency department if:   · You have shortness of breath  · Your breath smells fruity  · You have nausea and vomiting  · You have symptoms of dehydration, such as dark yellow urine, dry mouth and lips, and dry skin  Contact your healthcare provider if:   · You continue to have higher blood sugar levels than your healthcare provider recommends  · Your blood sugar level is over 240 mg/dl and  you have ketones in your urine  · You have questions or concerns about your condition or care  Medicines:   · Medicines  such as insulin and hypoglycemic medicine decrease blood sugar levels  · Take your medicine as directed  Contact your healthcare provider if you think your medicine is not helping or if you have side effects  Tell him or her if you are allergic to any medicine  Keep a list of the medicines, vitamins, and herbs you take  Include the amounts, and when and why you take them  Bring the list or the pill bottles to follow-up visits  Carry your medicine list with you in case of an emergency  Follow up with your healthcare provider or specialist as directed: Your healthcare provider may refer you to a dietitian or diabetes specialist  Write down your questions so you remember to ask them during your visits     Manage diabetic hyperglycemia:   · If you take diabetes medicine or insulin, take it as directed  Missed or wrong doses can cause your blood sugar to go up  · Tell your healthcare provider if you continue to have trouble managing your blood sugar  He may change the type, amount, or timing of your diabetes medicine or insulin  If you do not take diabetes medicine or insulin, you may need to start  · Work with your healthcare provider to develop a sick day plan  Illness can cause your blood sugar to rise  A sick day plan helps you control your blood sugar level when you are sick  Prevent diabetic hyperglycemia:   · Check your blood sugar levels regularly  Ask your healthcare provider how often to check your blood sugar and what your levels should be  · Follow your meal plan  Your blood sugar can go up if you eat a large meal or you eat more carbohydrates than recommended  Work with a dietitian to develop a meal plan that is right for you  · Exercise regularly  to help lower your blood sugar when it is high  It can also keep your blood sugar levels steady over time  Exercise for at least 30 minutes, 5 days a week  Include muscle strengthening activities 2 days each week  Do not sit for longer than 90 minutes at a time  Work with your healthcare provider to create an exercise plan  Children should get at least 60 minutes of physical activity each day  · Check your ketones before exercise  if your blood sugar level is above 240 mg/dl  Do not exercise if you have ketones in your urine,  because your blood sugar level may rise even more  Ask your healthcare provider how to lower your blood sugar when you have ketones  © 2017 [address] [employer] Information is for End User's use only and may not be sold, redistributed or otherwise used for commercial purposes  All illustrations and images included in CareNotes® are the copyrighted property of [employer]  or Bradley Sandhu  The above information is an  only   It is not intended as medical advice for individual conditions or treatments  Talk to your doctor, nurse or pharmacist before following any medical regimen to see if it is safe and effective for you

## 2018-11-25 DIAGNOSIS — E11.65 UNCONTROLLED TYPE 2 DIABETES MELLITUS WITH HYPERGLYCEMIA (HCC): ICD-10-CM

## 2018-11-25 DIAGNOSIS — E11.9 TYPE 2 DIABETES MELLITUS WITHOUT COMPLICATION, WITHOUT LONG-TERM CURRENT USE OF INSULIN (HCC): ICD-10-CM

## 2018-12-06 DIAGNOSIS — E11.65 UNCONTROLLED TYPE 2 DIABETES MELLITUS WITH HYPERGLYCEMIA (HCC): Primary | ICD-10-CM

## 2019-03-29 DIAGNOSIS — E11.65 UNCONTROLLED TYPE 2 DIABETES MELLITUS WITH HYPERGLYCEMIA (HCC): ICD-10-CM

## 2019-03-29 DIAGNOSIS — E11.9 TYPE 2 DIABETES MELLITUS WITHOUT COMPLICATION, WITHOUT LONG-TERM CURRENT USE OF INSULIN (HCC): ICD-10-CM

## 2019-06-10 DIAGNOSIS — I10 ESSENTIAL HYPERTENSION: ICD-10-CM

## 2019-06-10 DIAGNOSIS — E11.9 TYPE 2 DIABETES MELLITUS WITHOUT COMPLICATION, WITHOUT LONG-TERM CURRENT USE OF INSULIN (HCC): ICD-10-CM

## 2019-06-10 RX ORDER — GLIMEPIRIDE 1 MG/1
1 TABLET ORAL
Qty: 30 TABLET | Refills: 5 | Status: SHIPPED | OUTPATIENT
Start: 2019-06-10 | End: 2020-05-20 | Stop reason: ALTCHOICE

## 2019-06-10 RX ORDER — VALSARTAN AND HYDROCHLOROTHIAZIDE 160; 12.5 MG/1; MG/1
TABLET, FILM COATED ORAL
Qty: 30 TABLET | Refills: 5 | Status: SHIPPED | OUTPATIENT
Start: 2019-06-10 | End: 2019-12-19 | Stop reason: SDUPTHER

## 2019-06-20 ENCOUNTER — OFFICE VISIT (OUTPATIENT)
Dept: FAMILY MEDICINE CLINIC | Facility: CLINIC | Age: 64
End: 2019-06-20
Payer: MEDICARE

## 2019-06-20 VITALS
OXYGEN SATURATION: 97 % | DIASTOLIC BLOOD PRESSURE: 86 MMHG | HEART RATE: 71 BPM | SYSTOLIC BLOOD PRESSURE: 128 MMHG | BODY MASS INDEX: 35.43 KG/M2 | HEIGHT: 70 IN | TEMPERATURE: 97.5 F | WEIGHT: 247.5 LBS

## 2019-06-20 DIAGNOSIS — E66.9 OBESITY (BMI 35.0-39.9 WITHOUT COMORBIDITY): ICD-10-CM

## 2019-06-20 DIAGNOSIS — Z12.11 SCREENING FOR COLON CANCER: ICD-10-CM

## 2019-06-20 DIAGNOSIS — I10 ESSENTIAL HYPERTENSION: ICD-10-CM

## 2019-06-20 DIAGNOSIS — E11.65 UNCONTROLLED TYPE 2 DIABETES MELLITUS WITH HYPERGLYCEMIA (HCC): Primary | ICD-10-CM

## 2019-06-20 LAB — SL AMB POCT HEMOGLOBIN AIC: 6.1 (ref ?–6.5)

## 2019-06-20 PROCEDURE — 99213 OFFICE O/P EST LOW 20 MIN: CPT | Performed by: FAMILY MEDICINE

## 2019-06-20 PROCEDURE — 83036 HEMOGLOBIN GLYCOSYLATED A1C: CPT | Performed by: FAMILY MEDICINE

## 2019-09-11 ENCOUNTER — OFFICE VISIT (OUTPATIENT)
Dept: FAMILY MEDICINE CLINIC | Facility: CLINIC | Age: 64
End: 2019-09-11
Payer: MEDICARE

## 2019-09-11 VITALS
TEMPERATURE: 98.6 F | HEIGHT: 70 IN | HEART RATE: 88 BPM | BODY MASS INDEX: 35.15 KG/M2 | DIASTOLIC BLOOD PRESSURE: 78 MMHG | SYSTOLIC BLOOD PRESSURE: 112 MMHG | WEIGHT: 245.5 LBS | OXYGEN SATURATION: 97 %

## 2019-09-11 DIAGNOSIS — R10.32 ABDOMINAL PAIN, ACUTE, LEFT LOWER QUADRANT: Primary | ICD-10-CM

## 2019-09-11 DIAGNOSIS — I10 ESSENTIAL HYPERTENSION: ICD-10-CM

## 2019-09-11 DIAGNOSIS — E11.9 TYPE 2 DIABETES MELLITUS WITHOUT COMPLICATION, WITHOUT LONG-TERM CURRENT USE OF INSULIN (HCC): ICD-10-CM

## 2019-09-11 DIAGNOSIS — K57.32 DIVERTICULITIS OF LARGE INTESTINE WITHOUT PERFORATION OR ABSCESS WITHOUT BLEEDING: ICD-10-CM

## 2019-09-11 PROCEDURE — 99214 OFFICE O/P EST MOD 30 MIN: CPT | Performed by: FAMILY MEDICINE

## 2019-09-11 RX ORDER — METRONIDAZOLE 250 MG/1
250 TABLET ORAL EVERY 8 HOURS SCHEDULED
Qty: 21 TABLET | Refills: 0 | Status: SHIPPED | OUTPATIENT
Start: 2019-09-11 | End: 2019-09-18

## 2019-09-11 RX ORDER — CIPROFLOXACIN 500 MG/1
500 TABLET, FILM COATED ORAL EVERY 12 HOURS SCHEDULED
Qty: 14 TABLET | Refills: 0 | Status: SHIPPED | OUTPATIENT
Start: 2019-09-11 | End: 2019-09-18

## 2019-09-11 NOTE — PROGRESS NOTES
Assessment/Plan:      Diagnoses and all orders for this visit:    Abdominal pain, acute, left lower quadrant  Comments:  likely diverticulitis    Diverticulitis of large intestine without perforation or abscess without bleeding  Comments:  start cipro 1 tab twice a day, flagyl 3 times a day, light diet  Orders:  -     ciprofloxacin (CIPRO) 500 mg tablet; Take 1 tablet (500 mg total) by mouth every 12 (twelve) hours for 7 days  -     metroNIDAZOLE (FLAGYL) 250 mg tablet; Take 1 tablet (250 mg total) by mouth every 8 (eight) hours for 7 days    Essential hypertension  Comments:  controlled, continue current medication    Type 2 diabetes mellitus without complication, without long-term current use of insulin ([facility] [address] )  Comments:  controlled,           Subjective:  Chief Complaint   Patient presents with    LLQ Pain     pt here with LLQ pain for the past 5 days  Took advil for the pain last night  It has been disrupting his sleep  Pt states pain is worse when he has a BM or gas  There is a spot on his stomach that hurts when he presses on it and the pain radiates in 3-4 areas to his groin and buttocks  Pt felt like he had a fever this morning because he felt warm  Pt due for AWV  Did not leave urine micro today and did not have eye exam  FBW due after 10/22  A1C was 6 1 on 19  Pt defers pneumococcal vaccination  Patient ID: Umer Lucia is a 71 y o  male  Yamileth Reese complains of pain in left lower abdomen started 5 days ago  Denies nausea or vomiting  No fever  Feels tired  Increased stress, mother recently   He never had colonoscopy      Review of Systems   Constitutional: Negative  Negative for fatigue and fever  HENT: Negative  Eyes: Negative  Respiratory: Negative  Negative for cough  Cardiovascular: Negative  Gastrointestinal: Positive for abdominal pain  Endocrine: Negative  Genitourinary: Negative  Musculoskeletal: Negative  Skin: Negative  Allergic/Immunologic: Negative  Neurological: Negative  Psychiatric/Behavioral: Negative  The following portions of the patient's history were reviewed and updated as appropriate: allergies, current medications, past family history, past medical history, past social history, past surgical history and problem list     Objective:  Vitals:    09/11/19 1156   BP: 112/78   Pulse: 88   Temp: 98 6 °F (37 °C)   SpO2: 97%   Weight: 111 kg (245 lb 8 oz)   Height: 5' 10" (1 778 m)      Physical Exam   Constitutional: He is oriented to person, place, and time  He appears well-developed and well-nourished  HENT:   Head: Normocephalic and atraumatic  Cardiovascular: Normal rate, regular rhythm, normal heart sounds and intact distal pulses  Pulmonary/Chest: Effort normal and breath sounds normal    Abdominal: Soft  Bowel sounds are normal  There is tenderness  Left lower quadrant abdominal pain   Neurological: He is alert and oriented to person, place, and time  Skin: Skin is warm and dry  Psychiatric: He has a normal mood and affect  His behavior is normal  Judgment and thought content normal    Nursing note and vitals reviewed

## 2019-09-11 NOTE — PATIENT INSTRUCTIONS
cipro 1 tab twice a day 7 days   Flagyl 1 tab 3 times a day for 7 days  Diverticulitis   AMBULATORY CARE:   Diverticulitis  is a condition that causes small pockets along your intestine called diverticula to become inflamed or infected  This is caused by hard bowel movement, food, or bacteria that get stuck in the pockets  Signs and symptoms include the following:   · Pain in the lower left side of your abdomen    · Fever and chills    · Nausea or vomiting     · Constipation or diarrhea     · An urge to urinate or have a bowel movement more often than usual     · Bloody bowel movements    · Bloating and gas  Seek care immediately if:   · You have bowel movement or foul-smelling discharge leaking from your vagina or in your urine  · You have severe diarrhea  · You urinate less than usual or not at all  · You are not able to have a bowel movement  · You cannot stop vomiting  · You have cramps or severe abdominal pain and a fever  · You have new or increased blood in your bowel movements  Contact your healthcare provider if:   · You have pain when you urinate  · Your symptoms get worse or do not go away  · You have questions or concerns about your condition or care  Treatment:  Mild diverticulitis can be treated at home  You may need to rest and follow a clear liquid diet until your diverticulitis gets better  You will be admitted to the hospital if you have severe diverticulitis  You may need any of the following:  · Antibiotics  help treat or prevent a bacterial infection  · Prescription pain medicine  may be given  Ask your healthcare provider how to take this medicine safely  Some prescription pain medicines contain acetaminophen  Do not take other medicines that contain acetaminophen without talking to your healthcare provider  Too much acetaminophen may cause liver damage  Prescription pain medicine may cause constipation   Ask your healthcare provider how to prevent or treat constipation  · An IV  may be used to give you liquids and nutrition  You may not be able to eat or drink anything until your healthcare provider says it is okay  · Drainage  may be done to reduce inflammation or treat infection  Your healthcare provider may insert a small tube through an incision in your abdomen to drain pus from infected diverticula  · Surgery  may be needed if there is a hole in your bowel or a large amount of swelling  A healthcare provider will remove the infected or inflamed areas of your colon  Clear liquid diet:  A clear liquid diet includes any liquids that you can see through  Examples include water, ginger-keiko, cranberry or apple juice, frozen fruit ice, or broth  Stay on a clear liquid diet until your symptoms are gone, or as directed  Follow up with your healthcare provider as directed: You may need to return for a colonoscopy  When your symptoms are gone, you may need a low-fat, high-fiber diet to prevent diverticulitis from developing again  Your healthcare provider or dietitian can help you create meal plans  Write down your questions so you remember to ask them during your visits  © 2017 [employer]  Information is for End User's use only and may not be sold, redistributed or otherwise used for commercial purposes  All illustrations and images included in CareNotes® are the copyrighted property of A [employer]  or Bardley Sandhu  The above information is an  only  It is not intended as medical advice for individual conditions or treatments  Talk to your doctor, nurse or pharmacist before following any medical regimen to see if it is safe and effective for you

## 2019-09-13 DIAGNOSIS — E11.9 TYPE 2 DIABETES MELLITUS WITHOUT COMPLICATION, WITHOUT LONG-TERM CURRENT USE OF INSULIN (HCC): ICD-10-CM

## 2019-09-13 DIAGNOSIS — E11.65 UNCONTROLLED TYPE 2 DIABETES MELLITUS WITH HYPERGLYCEMIA (HCC): ICD-10-CM

## 2019-12-14 DIAGNOSIS — E11.65 UNCONTROLLED TYPE 2 DIABETES MELLITUS WITH HYPERGLYCEMIA (HCC): ICD-10-CM

## 2019-12-14 DIAGNOSIS — E11.9 TYPE 2 DIABETES MELLITUS WITHOUT COMPLICATION, WITHOUT LONG-TERM CURRENT USE OF INSULIN (HCC): ICD-10-CM

## 2019-12-19 DIAGNOSIS — I10 ESSENTIAL HYPERTENSION: ICD-10-CM

## 2019-12-19 RX ORDER — VALSARTAN AND HYDROCHLOROTHIAZIDE 160; 12.5 MG/1; MG/1
TABLET, FILM COATED ORAL
Qty: 30 TABLET | Refills: 5 | Status: SHIPPED | OUTPATIENT
Start: 2019-12-19 | End: 2020-08-10 | Stop reason: SDUPTHER

## 2020-03-16 DIAGNOSIS — E11.65 UNCONTROLLED TYPE 2 DIABETES MELLITUS WITH HYPERGLYCEMIA (HCC): ICD-10-CM

## 2020-03-16 DIAGNOSIS — E11.9 TYPE 2 DIABETES MELLITUS WITHOUT COMPLICATION, WITHOUT LONG-TERM CURRENT USE OF INSULIN (HCC): ICD-10-CM

## 2020-04-28 ENCOUNTER — PATIENT OUTREACH (OUTPATIENT)
Dept: FAMILY MEDICINE CLINIC | Facility: CLINIC | Age: 65
End: 2020-04-28

## 2020-05-08 ENCOUNTER — TELEPHONE (OUTPATIENT)
Dept: FAMILY MEDICINE CLINIC | Facility: CLINIC | Age: 65
End: 2020-05-08

## 2020-05-20 ENCOUNTER — HOSPITAL ENCOUNTER (OUTPATIENT)
Dept: RADIOLOGY | Facility: HOSPITAL | Age: 65
Discharge: HOME | End: 2020-05-20
Payer: MEDICARE

## 2020-05-20 ENCOUNTER — APPOINTMENT (OUTPATIENT)
Dept: LAB | Facility: HOSPITAL | Age: 65
End: 2020-05-20
Payer: MEDICARE

## 2020-05-20 ENCOUNTER — OFFICE VISIT (OUTPATIENT)
Dept: FAMILY MEDICINE CLINIC | Facility: CLINIC | Age: 65
End: 2020-05-20
Payer: MEDICARE

## 2020-05-20 VITALS
HEIGHT: 70 IN | DIASTOLIC BLOOD PRESSURE: 82 MMHG | WEIGHT: 232 LBS | BODY MASS INDEX: 33.21 KG/M2 | SYSTOLIC BLOOD PRESSURE: 124 MMHG | OXYGEN SATURATION: 97 % | TEMPERATURE: 98.1 F | HEART RATE: 91 BPM

## 2020-05-20 DIAGNOSIS — E78.2 MIXED HYPERLIPIDEMIA: ICD-10-CM

## 2020-05-20 DIAGNOSIS — E11.65 UNCONTROLLED TYPE 2 DIABETES MELLITUS WITH HYPERGLYCEMIA (HCC): ICD-10-CM

## 2020-05-20 DIAGNOSIS — Z13.29 SCREENING FOR ENDOCRINE, NUTRITIONAL, METABOLIC AND IMMUNITY DISORDER: ICD-10-CM

## 2020-05-20 DIAGNOSIS — Z13.0 SCREENING FOR IRON DEFICIENCY ANEMIA: ICD-10-CM

## 2020-05-20 DIAGNOSIS — R35.0 URINARY FREQUENCY: ICD-10-CM

## 2020-05-20 DIAGNOSIS — Z13.228 SCREENING FOR ENDOCRINE, NUTRITIONAL, METABOLIC AND IMMUNITY DISORDER: ICD-10-CM

## 2020-05-20 DIAGNOSIS — M25.471 RIGHT ANKLE SWELLING: ICD-10-CM

## 2020-05-20 DIAGNOSIS — Z13.21 SCREENING FOR ENDOCRINE, NUTRITIONAL, METABOLIC AND IMMUNITY DISORDER: ICD-10-CM

## 2020-05-20 DIAGNOSIS — Z13.0 SCREENING FOR ENDOCRINE, NUTRITIONAL, METABOLIC AND IMMUNITY DISORDER: ICD-10-CM

## 2020-05-20 DIAGNOSIS — E11.9 TYPE 2 DIABETES MELLITUS WITHOUT COMPLICATION, WITHOUT LONG-TERM CURRENT USE OF INSULIN (HCC): ICD-10-CM

## 2020-05-20 DIAGNOSIS — Z13.29 SCREENING FOR THYROID DISORDER: ICD-10-CM

## 2020-05-20 DIAGNOSIS — M25.471 RIGHT ANKLE SWELLING: Primary | ICD-10-CM

## 2020-05-20 DIAGNOSIS — E11.9 CONTROLLED TYPE 2 DIABETES MELLITUS WITHOUT COMPLICATION, WITHOUT LONG-TERM CURRENT USE OF INSULIN (HCC): ICD-10-CM

## 2020-05-20 DIAGNOSIS — I10 ESSENTIAL HYPERTENSION: ICD-10-CM

## 2020-05-20 DIAGNOSIS — L03.115 CELLULITIS OF RIGHT LOWER EXTREMITY: ICD-10-CM

## 2020-05-20 DIAGNOSIS — Z12.5 PROSTATE CANCER SCREENING: ICD-10-CM

## 2020-05-20 PROBLEM — R63.1 POLYDIPSIA: Status: RESOLVED | Noted: 2018-10-22 | Resolved: 2020-05-20

## 2020-05-20 PROBLEM — K57.32 DIVERTICULITIS LARGE INTESTINE: Status: RESOLVED | Noted: 2019-09-11 | Resolved: 2020-05-20

## 2020-05-20 PROBLEM — R35.89 POLYURIA: Status: RESOLVED | Noted: 2018-10-22 | Resolved: 2020-05-20

## 2020-05-20 PROBLEM — R10.32 ABDOMINAL PAIN, ACUTE, LEFT LOWER QUADRANT: Status: RESOLVED | Noted: 2019-09-11 | Resolved: 2020-05-20

## 2020-05-20 LAB
ALBUMIN SERPL BCP-MCNC: 4 G/DL (ref 3.5–5)
ALP SERPL-CCNC: 73 U/L (ref 46–116)
ALT SERPL W P-5'-P-CCNC: 27 U/L (ref 12–78)
ANION GAP SERPL CALCULATED.3IONS-SCNC: 8 MMOL/L (ref 4–13)
AST SERPL W P-5'-P-CCNC: 7 U/L (ref 5–45)
BASOPHILS # BLD AUTO: 0.06 THOUSANDS/ΜL (ref 0–0.1)
BASOPHILS NFR BLD AUTO: 0 % (ref 0–1)
BILIRUB SERPL-MCNC: 1.22 MG/DL (ref 0.2–1)
BUN SERPL-MCNC: 11 MG/DL (ref 5–25)
CALCIUM SERPL-MCNC: 9.2 MG/DL (ref 8.3–10.1)
CHLORIDE SERPL-SCNC: 105 MMOL/L (ref 100–108)
CHOLEST SERPL-MCNC: 152 MG/DL (ref 50–200)
CO2 SERPL-SCNC: 25 MMOL/L (ref 21–32)
CREAT SERPL-MCNC: 1.02 MG/DL (ref 0.6–1.3)
CREAT UR-MCNC: 312 MG/DL
EOSINOPHIL # BLD AUTO: 0.11 THOUSAND/ΜL (ref 0–0.61)
EOSINOPHIL NFR BLD AUTO: 1 % (ref 0–6)
ERYTHROCYTE [DISTWIDTH] IN BLOOD BY AUTOMATED COUNT: 12.4 % (ref 11.6–15.1)
GFR SERPL CREATININE-BSD FRML MDRD: 76 ML/MIN/1.73SQ M
GLUCOSE SERPL-MCNC: 201 MG/DL (ref 65–140)
HCT VFR BLD AUTO: 46.3 % (ref 36.5–49.3)
HDLC SERPL-MCNC: 42 MG/DL
HGB BLD-MCNC: 15.8 G/DL (ref 12–17)
IMM GRANULOCYTES # BLD AUTO: 0.09 THOUSAND/UL (ref 0–0.2)
IMM GRANULOCYTES NFR BLD AUTO: 1 % (ref 0–2)
LDLC SERPL CALC-MCNC: 79 MG/DL (ref 0–100)
LYMPHOCYTES # BLD AUTO: 1.91 THOUSANDS/ΜL (ref 0.6–4.47)
LYMPHOCYTES NFR BLD AUTO: 13 % (ref 14–44)
MCH RBC QN AUTO: 31.7 PG (ref 26.8–34.3)
MCHC RBC AUTO-ENTMCNC: 34.1 G/DL (ref 31.4–37.4)
MCV RBC AUTO: 93 FL (ref 82–98)
MICROALBUMIN UR-MCNC: 23 MG/L (ref 0–20)
MICROALBUMIN/CREAT 24H UR: 7 MG/G CREATININE (ref 0–30)
MONOCYTES # BLD AUTO: 1.42 THOUSAND/ΜL (ref 0.17–1.22)
MONOCYTES NFR BLD AUTO: 10 % (ref 4–12)
NEUTROPHILS # BLD AUTO: 10.78 THOUSANDS/ΜL (ref 1.85–7.62)
NEUTS SEG NFR BLD AUTO: 75 % (ref 43–75)
NONHDLC SERPL-MCNC: 110 MG/DL
NRBC BLD AUTO-RTO: 0 /100 WBCS
PLATELET # BLD AUTO: 186 THOUSANDS/UL (ref 149–390)
PMV BLD AUTO: 10.3 FL (ref 8.9–12.7)
POTASSIUM SERPL-SCNC: 3.5 MMOL/L (ref 3.5–5.3)
PROT SERPL-MCNC: 7.7 G/DL (ref 6.4–8.2)
RBC # BLD AUTO: 4.98 MILLION/UL (ref 3.88–5.62)
SL AMB POCT HEMOGLOBIN AIC: 6.7 (ref ?–6.5)
SODIUM SERPL-SCNC: 138 MMOL/L (ref 136–145)
TRIGL SERPL-MCNC: 154 MG/DL
TSH SERPL DL<=0.05 MIU/L-ACNC: 1.65 UIU/ML (ref 0.36–3.74)
URATE SERPL-MCNC: 6.9 MG/DL (ref 4.2–8)
WBC # BLD AUTO: 14.37 THOUSAND/UL (ref 4.31–10.16)

## 2020-05-20 PROCEDURE — 83036 HEMOGLOBIN GLYCOSYLATED A1C: CPT | Performed by: FAMILY MEDICINE

## 2020-05-20 PROCEDURE — 82043 UR ALBUMIN QUANTITATIVE: CPT | Performed by: FAMILY MEDICINE

## 2020-05-20 PROCEDURE — 84443 ASSAY THYROID STIM HORMONE: CPT

## 2020-05-20 PROCEDURE — 3060F POS MICROALBUMINURIA REV: CPT | Performed by: FAMILY MEDICINE

## 2020-05-20 PROCEDURE — 3008F BODY MASS INDEX DOCD: CPT | Performed by: FAMILY MEDICINE

## 2020-05-20 PROCEDURE — 82570 ASSAY OF URINE CREATININE: CPT | Performed by: FAMILY MEDICINE

## 2020-05-20 PROCEDURE — 84550 ASSAY OF BLOOD/URIC ACID: CPT

## 2020-05-20 PROCEDURE — 80061 LIPID PANEL: CPT

## 2020-05-20 PROCEDURE — 80053 COMPREHEN METABOLIC PANEL: CPT

## 2020-05-20 PROCEDURE — 3074F SYST BP LT 130 MM HG: CPT | Performed by: FAMILY MEDICINE

## 2020-05-20 PROCEDURE — 1036F TOBACCO NON-USER: CPT | Performed by: FAMILY MEDICINE

## 2020-05-20 PROCEDURE — 3079F DIAST BP 80-89 MM HG: CPT | Performed by: FAMILY MEDICINE

## 2020-05-20 PROCEDURE — 84154 ASSAY OF PSA FREE: CPT

## 2020-05-20 PROCEDURE — 84153 ASSAY OF PSA TOTAL: CPT

## 2020-05-20 PROCEDURE — 73610 X-RAY EXAM OF ANKLE: CPT

## 2020-05-20 PROCEDURE — 3044F HG A1C LEVEL LT 7.0%: CPT | Performed by: FAMILY MEDICINE

## 2020-05-20 PROCEDURE — 85025 COMPLETE CBC W/AUTO DIFF WBC: CPT

## 2020-05-20 PROCEDURE — 99214 OFFICE O/P EST MOD 30 MIN: CPT | Performed by: FAMILY MEDICINE

## 2020-05-20 PROCEDURE — 1160F RVW MEDS BY RX/DR IN RCRD: CPT | Performed by: FAMILY MEDICINE

## 2020-05-20 PROCEDURE — 36415 COLL VENOUS BLD VENIPUNCTURE: CPT

## 2020-05-20 RX ORDER — CEFUROXIME AXETIL 250 MG/1
250 TABLET ORAL EVERY 12 HOURS SCHEDULED
Qty: 20 TABLET | Refills: 0 | Status: SHIPPED | OUTPATIENT
Start: 2020-05-20 | End: 2020-08-06

## 2020-05-21 LAB
PSA FREE MFR SERPL: 31.1 %
PSA FREE SERPL-MCNC: 0.87 NG/ML
PSA SERPL-MCNC: 2.8 NG/ML (ref 0–4)

## 2020-06-24 ENCOUNTER — TELEPHONE (OUTPATIENT)
Dept: FAMILY MEDICINE CLINIC | Facility: CLINIC | Age: 65
End: 2020-06-24

## 2020-06-24 DIAGNOSIS — Z20.828 EXPOSURE TO SARS-ASSOCIATED CORONAVIRUS: Primary | ICD-10-CM

## 2020-08-06 DIAGNOSIS — M25.471 RIGHT ANKLE SWELLING: ICD-10-CM

## 2020-08-06 DIAGNOSIS — L03.115 CELLULITIS OF RIGHT LOWER EXTREMITY: ICD-10-CM

## 2020-08-06 RX ORDER — CEFUROXIME AXETIL 250 MG/1
250 TABLET ORAL EVERY 12 HOURS SCHEDULED
Qty: 20 TABLET | Refills: 0 | Status: SHIPPED | OUTPATIENT
Start: 2020-08-06 | End: 2020-08-16

## 2020-08-10 DIAGNOSIS — I10 ESSENTIAL HYPERTENSION: ICD-10-CM

## 2020-08-10 RX ORDER — VALSARTAN AND HYDROCHLOROTHIAZIDE 160; 12.5 MG/1; MG/1
1 TABLET, FILM COATED ORAL DAILY
Qty: 30 TABLET | Refills: 5 | Status: SHIPPED | OUTPATIENT
Start: 2020-08-10 | End: 2020-10-21 | Stop reason: ALTCHOICE

## 2020-09-02 ENCOUNTER — OFFICE VISIT (OUTPATIENT)
Dept: FAMILY MEDICINE CLINIC | Facility: CLINIC | Age: 65
End: 2020-09-02
Payer: MEDICARE

## 2020-09-02 VITALS
OXYGEN SATURATION: 98 % | TEMPERATURE: 97.4 F | HEART RATE: 81 BPM | HEIGHT: 70 IN | DIASTOLIC BLOOD PRESSURE: 80 MMHG | WEIGHT: 227.5 LBS | BODY MASS INDEX: 32.57 KG/M2 | SYSTOLIC BLOOD PRESSURE: 118 MMHG

## 2020-09-02 DIAGNOSIS — Z12.11 SCREENING FOR COLORECTAL CANCER: ICD-10-CM

## 2020-09-02 DIAGNOSIS — Z12.12 SCREENING FOR COLORECTAL CANCER: ICD-10-CM

## 2020-09-02 DIAGNOSIS — E11.9 TYPE 2 DIABETES MELLITUS WITHOUT COMPLICATION, WITHOUT LONG-TERM CURRENT USE OF INSULIN (HCC): Primary | ICD-10-CM

## 2020-09-02 DIAGNOSIS — I10 ESSENTIAL HYPERTENSION: ICD-10-CM

## 2020-09-02 DIAGNOSIS — E11.9 CONTROLLED TYPE 2 DIABETES MELLITUS WITHOUT COMPLICATION, WITHOUT LONG-TERM CURRENT USE OF INSULIN (HCC): ICD-10-CM

## 2020-09-02 LAB — SL AMB POCT HEMOGLOBIN AIC: 10.5 (ref ?–6.5)

## 2020-09-02 PROCEDURE — 99214 OFFICE O/P EST MOD 30 MIN: CPT | Performed by: FAMILY MEDICINE

## 2020-09-02 PROCEDURE — 83036 HEMOGLOBIN GLYCOSYLATED A1C: CPT | Performed by: FAMILY MEDICINE

## 2020-09-02 NOTE — PATIENT INSTRUCTIONS
Obesity   AMBULATORY CARE:   Obesity  is when your body mass index (BMI) is greater than 30  Your healthcare provider will use your height and weight to measure your BMI  The risks of obesity include  many health problems, such as injuries or physical disability  You may need tests to check for the following:  · Diabetes     · High blood pressure or high cholesterol     · Heart disease     · Gallbladder or liver disease     · Cancer of the colon, breast, prostate, liver, or kidney     · Sleep apnea     · Arthritis or gout  Seek care immediately if:   · You have a severe headache, confusion, or difficulty speaking  · You have weakness on one side of your body  · You have chest pain, sweating, or shortness of breath  Contact your healthcare provider if:   · You have symptoms of gallbladder or liver disease, such as pain in your upper abdomen  · You have knee or hip pain and discomfort while walking  · You have symptoms of diabetes, such as intense hunger and thirst, and frequent urination  · You have symptoms of sleep apnea, such as snoring or daytime sleepiness  · You have questions or concerns about your condition or care  Treatment for obesity  focuses on helping you lose weight to improve your health  Even a small decrease in BMI can reduce the risk for many health problems  Your healthcare provider will help you set a weight-loss goal   · Lifestyle changes  are the first step in treating obesity  These include making healthy food choices and getting regular physical activity  Your healthcare provider may suggest a weight-loss program that involves coaching, education, and therapy  · Medicine  may help you lose weight when it is used with a healthy diet and physical activity  · Surgery  can help you lose weight if you are very obese and have other health problems  There are several types of weight-loss surgery  Ask your healthcare provider for more information    Be successful losing weight:   · Set small, realistic goals  An example of a small goal is to walk for 20 minutes 5 days a week  Anther goal is to lose 5% of your body weight  · Tell friends, family members, and coworkers about your goals  and ask for their support  Ask a friend to lose weight with you, or join a weight-loss support group  · Identify foods or triggers that may cause you to overeat , and find ways to avoid them  Remove tempting high-calorie foods from your home and workplace  Place a bowl of fresh fruit on your kitchen counter  If stress causes you to eat, then find other ways to cope with stress  · Keep a diary to track what you eat and drink  Also write down how many minutes of physical activity you do each day  Weigh yourself once a week and record it in your diary  Eating changes: You will need to eat 500 to 1,000 fewer calories each day than you currently eat to lose 1 to 2 pounds a week  The following changes will help you cut calories:  · Eat smaller portions  Use small plates, no larger than 9 inches in diameter  Fill your plate half full of fruits and vegetables  Measure your food using measuring cups until you know what a serving size looks like  · Eat 3 meals and 1 or 2 snacks each day  Plan your meals in advance  Loyda Commander and eat at home most of the time  Eat slowly  · Eat fruits and vegetables at every meal   They are low in calories and high in fiber, which makes you feel full  Do not add butter, margarine, or cream sauce to vegetables  Use herbs to season steamed vegetables  · Eat less fat and fewer fried foods  Eat more baked or grilled chicken and fish  These protein sources are lower in calories and fat than red meat  Limit fast food  Dress your salads with olive oil and vinegar instead of bottled dressing  · Limit the amount of sugar you eat  Do not drink sugary beverages  Limit alcohol  Activity changes:  Physical activity is good for your body in many ways   It helps you burn calories and build strong muscles  It decreases stress and depression, and improves your mood  It can also help you sleep better  Talk to your healthcare provider before you begin an exercise program   · Exercise for at least 30 minutes 5 days a week  Start slowly  Set aside time each day for physical activity that you enjoy and that is convenient for you  It is best to do both weight training and an activity that increases your heart rate, such as walking, bicycling, or swimming  · Find ways to be more active  Do yard work and housecleaning  Walk up the stairs instead of using elevators  Spend your leisure time going to events that require walking, such as outdoor festivals or fairs  This extra physical activity can help you lose weight and keep it off  Follow up with your healthcare provider as directed: You may need to meet with a dietitian  Write down your questions so you remember to ask them during your visits  © 2017 [employer] Information is for End User's use only and may not be sold, redistributed or otherwise used for commercial purposes  All illustrations and images included in CareNotes® are the copyrighted property of A [employer]  or Bradley Sandhu  The above information is an  only  It is not intended as medical advice for individual conditions or treatments  Talk to your doctor, nurse or pharmacist before following any medical regimen to see if it is safe and effective for you

## 2020-09-02 NOTE — PROGRESS NOTES
Subjective:   Chief Complaint   Patient presents with    Diabetes     pt here fro diabetic f/u        Patient ID: Meron Chambers is a 76 y o  male  Here for 3 month check up  HbA1c is 10 5  Lots of stress due to fathers death  Not taking care of himself well  Not consistent with his medication compliance      The following portions of the patient's history were reviewed and updated as appropriate: allergies, current medications, past family history, past medical history, past social history, past surgical history and problem list     Review of Systems   Constitutional: Negative for activity change, appetite change, chills, diaphoresis, fatigue and unexpected weight change  HENT: Negative for congestion, ear discharge, ear pain, hearing loss, nosebleeds and rhinorrhea  Eyes: Negative for pain, redness, itching and visual disturbance  Respiratory: Negative for cough, choking, chest tightness and shortness of breath  Cardiovascular: Negative for chest pain and leg swelling  Gastrointestinal: Negative for abdominal pain, blood in stool, constipation, diarrhea and nausea  Endocrine: Negative for cold intolerance, polydipsia and polyphagia  Genitourinary: Negative for dysuria, frequency, hematuria and urgency  Musculoskeletal: Negative for arthralgias, back pain, gait problem, joint swelling, neck pain and neck stiffness  Skin: Negative for color change and rash  Allergic/Immunologic: Negative for environmental allergies and food allergies  Neurological: Negative for dizziness, tremors, seizures, speech difficulty, numbness and headaches  Hematological: Negative for adenopathy  Does not bruise/bleed easily  Psychiatric/Behavioral: Negative for behavioral problems, dysphoric mood, hallucinations and self-injury           Objective:  Vitals:    09/02/20 0841   BP: 118/80   Pulse: 81   Temp: (!) 97 4 °F (36 3 °C)   SpO2: 98%   Weight: 103 kg (227 lb 8 oz)   Height: 5' 10" (1 778 m) Physical Exam  Constitutional:       General: He is not in acute distress  Appearance: He is well-developed  He is not diaphoretic  HENT:      Head: Normocephalic and atraumatic  Right Ear: External ear normal       Left Ear: External ear normal       Nose: Nose normal       Mouth/Throat:      Pharynx: No oropharyngeal exudate  Eyes:      General: No scleral icterus  Right eye: No discharge  Left eye: No discharge  Conjunctiva/sclera: Conjunctivae normal       Pupils: Pupils are equal, round, and reactive to light  Neck:      Musculoskeletal: Normal range of motion and neck supple  Thyroid: No thyromegaly  Cardiovascular:      Rate and Rhythm: Normal rate and regular rhythm  Pulses: no weak pulses          Dorsalis pedis pulses are 1+ on the right side and 1+ on the left side  Posterior tibial pulses are 1+ on the right side and 1+ on the left side  Heart sounds: Normal heart sounds  No murmur  Pulmonary:      Effort: Pulmonary effort is normal       Breath sounds: Normal breath sounds  No wheezing or rales  Abdominal:      General: Bowel sounds are normal       Palpations: Abdomen is soft  There is no mass  Tenderness: There is no abdominal tenderness  There is no guarding  Musculoskeletal: Normal range of motion  General: No tenderness  Feet:      Right foot:      Skin integrity: No ulcer, skin breakdown, erythema, warmth, callus or dry skin  Left foot:      Skin integrity: No ulcer, skin breakdown, erythema, warmth, callus or dry skin  Lymphadenopathy:      Cervical: No cervical adenopathy  Skin:     General: Skin is warm and dry  Neurological:      Mental Status: He is alert and oriented to person, place, and time  Deep Tendon Reflexes: Reflexes are normal and symmetric  Psychiatric:         Thought Content:  Thought content normal          Judgment: Judgment normal          Right Foot/Ankle   Right Foot Inspection  Skin Exam: skin normal and skin intact no dry skin, no warmth, no callus, no erythema, no maceration, no abnormal color, no pre-ulcer, no ulcer and no callus                          Toe Exam: ROM and strength within normal limits  Sensory   Vibration: intact  Proprioception: intact   Monofilament testing: intact  Vascular  Capillary refills: < 3 seconds  The right DP pulse is 1+  The right PT pulse is 1+  Left Foot/Ankle  Left Foot Inspection  Skin Exam: skin normal and skin intactno dry skin, no warmth, no erythema, no maceration, normal color, no pre-ulcer, no ulcer and no callus                         Toe Exam: ROM and strength within normal limits                   Sensory   Vibration: intact  Proprioception: intact  Monofilament: intact  Vascular  Capillary refills: < 3 seconds  The left DP pulse is 1+  The left PT pulse is 1+  Assign Risk Category:  No deformity present; No loss of protective sensation; No weak pulses       Risk: 0        Assessment/Plan:    No problem-specific Assessment & Plan notes found for this encounter  Diagnoses and all orders for this visit:    Controlled type 2 diabetes mellitus without complication, without long-term current use of insulin ([facility])  -     POCT hemoglobin A1c    Screening for colorectal cancer  -     Ambulatory referral for colonoscopy; Future      I have asked the patient to take the metformin twice daily  He has only been taking it once a day  Recheck in 3 months  Referred for colonoscopy and reminded to see the eye doctor  BMI Counseling: Body mass index is 32 64 kg/m²  The BMI is above normal  Nutrition recommendations include reducing portion sizes and moderation in carbohydrate intake  Exercise recommendations include exercising 3-5 times per week

## 2020-09-09 DIAGNOSIS — E11.9 TYPE 2 DIABETES MELLITUS WITHOUT COMPLICATION, WITHOUT LONG-TERM CURRENT USE OF INSULIN (HCC): ICD-10-CM

## 2020-09-09 DIAGNOSIS — E11.65 UNCONTROLLED TYPE 2 DIABETES MELLITUS WITH HYPERGLYCEMIA (HCC): ICD-10-CM

## 2020-09-21 DIAGNOSIS — E11.65 UNCONTROLLED TYPE 2 DIABETES MELLITUS WITH HYPERGLYCEMIA (HCC): ICD-10-CM

## 2020-09-21 RX ORDER — BLOOD-GLUCOSE METER
KIT MISCELLANEOUS
Qty: 100 EACH | Refills: 2 | Status: SHIPPED | OUTPATIENT
Start: 2020-09-21 | End: 2020-10-04

## 2020-09-21 NOTE — TELEPHONE ENCOUNTER
Patient is in need of a new glucose machine and test strips  He uses the Freestyle monitoring kit but it won't work anymore      Please send to [facility]

## 2020-09-30 DIAGNOSIS — E11.9 TYPE 2 DIABETES MELLITUS WITHOUT COMPLICATION, WITHOUT LONG-TERM CURRENT USE OF INSULIN (HCC): Primary | ICD-10-CM

## 2020-10-02 DIAGNOSIS — E11.65 UNCONTROLLED TYPE 2 DIABETES MELLITUS WITH HYPERGLYCEMIA (HCC): ICD-10-CM

## 2020-10-02 RX ORDER — BLOOD-GLUCOSE METER
KIT MISCELLANEOUS
Qty: 200 EACH | Refills: 3 | Status: SHIPPED | OUTPATIENT
Start: 2020-10-02

## 2020-10-02 RX ORDER — BLOOD-GLUCOSE METER
KIT MISCELLANEOUS 2 TIMES DAILY
Qty: 1 EACH | Refills: 0 | Status: SHIPPED | OUTPATIENT
Start: 2020-10-02

## 2020-10-02 RX ORDER — LANCETS 28 GAUGE
EACH MISCELLANEOUS
Qty: 100 EACH | Refills: 0 | Status: SHIPPED | OUTPATIENT
Start: 2020-10-02

## 2020-10-03 DIAGNOSIS — E11.65 UNCONTROLLED TYPE 2 DIABETES MELLITUS WITH HYPERGLYCEMIA (HCC): ICD-10-CM

## 2020-10-04 RX ORDER — BLOOD-GLUCOSE METER
KIT MISCELLANEOUS
Qty: 200 EACH | Refills: 2 | Status: SHIPPED | OUTPATIENT
Start: 2020-10-04 | End: 2021-07-07 | Stop reason: SDUPTHER

## 2020-10-05 DIAGNOSIS — E11.9 TYPE 2 DIABETES MELLITUS WITHOUT COMPLICATION, WITHOUT LONG-TERM CURRENT USE OF INSULIN (HCC): ICD-10-CM

## 2020-10-05 DIAGNOSIS — E11.65 UNCONTROLLED TYPE 2 DIABETES MELLITUS WITH HYPERGLYCEMIA (HCC): ICD-10-CM

## 2020-10-20 DIAGNOSIS — E11.65 UNCONTROLLED TYPE 2 DIABETES MELLITUS WITH HYPERGLYCEMIA (HCC): ICD-10-CM

## 2020-10-20 DIAGNOSIS — E11.9 TYPE 2 DIABETES MELLITUS WITHOUT COMPLICATION, WITHOUT LONG-TERM CURRENT USE OF INSULIN (HCC): ICD-10-CM

## 2020-10-21 ENCOUNTER — OFFICE VISIT (OUTPATIENT)
Dept: FAMILY MEDICINE CLINIC | Facility: CLINIC | Age: 65
End: 2020-10-21
Payer: MEDICARE

## 2020-10-21 VITALS
BODY MASS INDEX: 29.92 KG/M2 | TEMPERATURE: 97.6 F | DIASTOLIC BLOOD PRESSURE: 92 MMHG | WEIGHT: 209 LBS | HEART RATE: 79 BPM | OXYGEN SATURATION: 95 % | HEIGHT: 70 IN | SYSTOLIC BLOOD PRESSURE: 120 MMHG

## 2020-10-21 DIAGNOSIS — Z12.11 ENCOUNTER FOR SCREENING COLONOSCOPY: ICD-10-CM

## 2020-10-21 DIAGNOSIS — I10 ESSENTIAL HYPERTENSION: ICD-10-CM

## 2020-10-21 DIAGNOSIS — E11.9 TYPE 2 DIABETES MELLITUS WITHOUT COMPLICATION, WITHOUT LONG-TERM CURRENT USE OF INSULIN (HCC): Primary | ICD-10-CM

## 2020-10-21 PROCEDURE — 99214 OFFICE O/P EST MOD 30 MIN: CPT | Performed by: FAMILY MEDICINE

## 2020-10-21 RX ORDER — GLIMEPIRIDE 2 MG/1
2 TABLET ORAL
Qty: 90 TABLET | Refills: 3 | Status: SHIPPED | OUTPATIENT
Start: 2020-10-21 | End: 2020-12-02 | Stop reason: SDUPTHER

## 2020-12-02 ENCOUNTER — OFFICE VISIT (OUTPATIENT)
Dept: FAMILY MEDICINE CLINIC | Facility: CLINIC | Age: 65
End: 2020-12-02
Payer: MEDICARE

## 2020-12-02 VITALS
SYSTOLIC BLOOD PRESSURE: 122 MMHG | HEART RATE: 73 BPM | HEIGHT: 70 IN | DIASTOLIC BLOOD PRESSURE: 84 MMHG | BODY MASS INDEX: 33.14 KG/M2 | OXYGEN SATURATION: 96 % | TEMPERATURE: 97 F | WEIGHT: 231.5 LBS

## 2020-12-02 DIAGNOSIS — E11.9 TYPE 2 DIABETES MELLITUS WITHOUT COMPLICATION, WITHOUT LONG-TERM CURRENT USE OF INSULIN (HCC): Primary | ICD-10-CM

## 2020-12-02 DIAGNOSIS — I10 ESSENTIAL HYPERTENSION: ICD-10-CM

## 2020-12-02 LAB — SL AMB POCT HEMOGLOBIN AIC: 9.3 (ref ?–6.5)

## 2020-12-02 PROCEDURE — 99214 OFFICE O/P EST MOD 30 MIN: CPT | Performed by: FAMILY MEDICINE

## 2020-12-02 PROCEDURE — 83036 HEMOGLOBIN GLYCOSYLATED A1C: CPT | Performed by: FAMILY MEDICINE

## 2020-12-02 RX ORDER — GLIMEPIRIDE 2 MG/1
2 TABLET ORAL
Qty: 90 TABLET | Refills: 3 | Status: SHIPPED | COMMUNITY
Start: 2020-12-02 | End: 2021-04-01 | Stop reason: SDUPTHER

## 2021-03-22 ENCOUNTER — OFFICE VISIT (OUTPATIENT)
Dept: FAMILY MEDICINE CLINIC | Facility: CLINIC | Age: 66
End: 2021-03-22
Payer: MEDICARE

## 2021-03-22 VITALS
TEMPERATURE: 98.3 F | BODY MASS INDEX: 33.5 KG/M2 | DIASTOLIC BLOOD PRESSURE: 98 MMHG | OXYGEN SATURATION: 96 % | HEART RATE: 74 BPM | SYSTOLIC BLOOD PRESSURE: 142 MMHG | HEIGHT: 70 IN | WEIGHT: 234 LBS

## 2021-03-22 DIAGNOSIS — L08.9 INFECTED EPITHELIAL INCLUSION CYST: Primary | ICD-10-CM

## 2021-03-22 DIAGNOSIS — L72.0 INFECTED EPITHELIAL INCLUSION CYST: Primary | ICD-10-CM

## 2021-03-22 PROBLEM — L03.115 CELLULITIS OF RIGHT LOWER EXTREMITY: Status: RESOLVED | Noted: 2020-05-20 | Resolved: 2021-03-22

## 2021-03-22 PROBLEM — R35.0 URINARY FREQUENCY: Status: RESOLVED | Noted: 2020-05-20 | Resolved: 2021-03-22

## 2021-03-22 PROBLEM — Z13.228 SCREENING FOR ENDOCRINE, NUTRITIONAL, METABOLIC AND IMMUNITY DISORDER: Status: RESOLVED | Noted: 2020-05-20 | Resolved: 2021-03-22

## 2021-03-22 PROBLEM — Z12.5 PROSTATE CANCER SCREENING: Status: RESOLVED | Noted: 2018-10-22 | Resolved: 2021-03-22

## 2021-03-22 PROBLEM — Z13.0 SCREENING FOR IRON DEFICIENCY ANEMIA: Status: RESOLVED | Noted: 2018-10-22 | Resolved: 2021-03-22

## 2021-03-22 PROBLEM — M25.471 RIGHT ANKLE SWELLING: Status: RESOLVED | Noted: 2020-05-20 | Resolved: 2021-03-22

## 2021-03-22 PROBLEM — Z13.21 SCREENING FOR ENDOCRINE, NUTRITIONAL, METABOLIC AND IMMUNITY DISORDER: Status: RESOLVED | Noted: 2020-05-20 | Resolved: 2021-03-22

## 2021-03-22 PROBLEM — Z13.29 SCREENING FOR THYROID DISORDER: Status: RESOLVED | Noted: 2018-10-22 | Resolved: 2021-03-22

## 2021-03-22 PROBLEM — Z13.29 SCREENING FOR ENDOCRINE, NUTRITIONAL, METABOLIC AND IMMUNITY DISORDER: Status: RESOLVED | Noted: 2020-05-20 | Resolved: 2021-03-22

## 2021-03-22 PROBLEM — Z13.0 SCREENING FOR ENDOCRINE, NUTRITIONAL, METABOLIC AND IMMUNITY DISORDER: Status: RESOLVED | Noted: 2020-05-20 | Resolved: 2021-03-22

## 2021-03-22 PROCEDURE — 10060 I&D ABSCESS SIMPLE/SINGLE: CPT | Performed by: FAMILY MEDICINE

## 2021-03-22 PROCEDURE — 99213 OFFICE O/P EST LOW 20 MIN: CPT | Performed by: FAMILY MEDICINE

## 2021-03-22 RX ORDER — SULFAMETHOXAZOLE AND TRIMETHOPRIM 800; 160 MG/1; MG/1
1 TABLET ORAL EVERY 12 HOURS SCHEDULED
Qty: 14 TABLET | Refills: 0 | Status: SHIPPED | OUTPATIENT
Start: 2021-03-22 | End: 2021-03-29

## 2021-03-22 NOTE — PROGRESS NOTES
BMI Counseling: Body mass index is 33 58 kg/m²  The BMI is above normal  Exercise recommendations include exercising 3-5 times per week  Subjective:   Chief Complaint   Patient presents with   Carolee Ospina for a lump behind his right ear  It has been there for a few months and seems to be getting bigger  Patient ID: Villa Montero is a 78 y o  male  The patient is here today for large lump behind the right ear   He says it was there a few months ago when he saw Dr Zoya Ospina but much smaller  It has started to grow, especially in the past two weeks   It is tender to the touch  Nothing has drained from it as far as he knows      The following portions of the patient's history were reviewed and updated as appropriate: allergies, current medications, past family history, past medical history, past social history, past surgical history and problem list     Review of Systems      Objective:  Vitals:    03/22/21 1454   BP: 142/98   BP Location: Left arm   Patient Position: Sitting   Cuff Size: Large   Pulse: 74   Temp: 98 3 °F (36 8 °C)   TempSrc: Tympanic   SpO2: 96%   Weight: 106 kg (234 lb)   Height: 5' 10" (1 778 m)      Physical Exam  Constitutional:       General: He is not in acute distress  Appearance: Normal appearance  He is not ill-appearing  Skin:     General: Skin is warm and dry  Findings: No erythema or rash  Comments: Large fluctuant cyst just posterior to the right ear, light touch causes drainage of pus   Neurological:      General: No focal deficit present  Mental Status: He is alert and oriented to person, place, and time  Cranial Nerves: No cranial nerve deficit  Gait: Gait normal    Psychiatric:         Mood and Affect: Mood normal          Behavior: Behavior normal          Thought Content:  Thought content normal          Judgment: Judgment normal          Diabetic Foot Exam        Incision and Drainage    Date/Time: 3/22/2021 3:24 PM  Performed by: Manasa Duong MD  Authorized by: Manasa Duong MD   Universal Protocol:  Procedure performed by: (Sasha Love CMA)  Consent: Verbal consent obtained  Risks and benefits: risks, benefits and alternatives were discussed  Consent given by: patient  Patient understanding: patient states understanding of the procedure being performed  Patient identity confirmed: verbally with patient      Location:     Type:  Cyst (infected)    Location:  Head/neck    Head/neck location:  Neck (just posterior to the right ear)  Pre-procedure details:     Skin preparation:  Alcohol wipes  Anesthesia (see MAR for exact dosages): Anesthesia method:  Local infiltration    Local anesthetic:  Lidocaine 2% WITH epi  Procedure details:     Complexity:  Simple    Needle aspiration: no      Incision types:  Stab incision    Scalpel blade:  10    Approach:  Open    Incision depth:  Subcutaneous    Wound management:  Probed and deloculated    Drainage:  Purulent    Drainage amount: Moderate    Wound treatment:  Wound left open  Post-procedure details:     Patient tolerance of procedure: Tolerated well, no immediate complications        Assessment/Plan:    No problem-specific Assessment & Plan notes found for this encounter  Incision and drainage was completed in a large amount of pus was drained  The patient had immediate relief of his discomfort as well  I did put him on an antibiotic because the wound is so large  I am also referring him to general surgery to consider removal of the cyst as I believe it likely will fill up again  Diagnoses and all orders for this visit:    Infected epithelial inclusion cyst  -     sulfamethoxazole-trimethoprim (BACTRIM DS) 800-160 mg per tablet; Take 1 tablet by mouth every 12 (twelve) hours for 7 days  -     Incision and Drainage  -     Ambulatory referral to General Surgery;  Future

## 2021-03-26 ENCOUNTER — CONSULT (OUTPATIENT)
Dept: SURGERY | Facility: HOSPITAL | Age: 66
End: 2021-03-26
Payer: MEDICARE

## 2021-03-26 VITALS
RESPIRATION RATE: 16 BRPM | SYSTOLIC BLOOD PRESSURE: 145 MMHG | DIASTOLIC BLOOD PRESSURE: 102 MMHG | WEIGHT: 234 LBS | BODY MASS INDEX: 33.5 KG/M2 | HEIGHT: 70 IN | TEMPERATURE: 97.4 F | HEART RATE: 78 BPM

## 2021-03-26 DIAGNOSIS — L72.0 INFECTED EPITHELIAL INCLUSION CYST: ICD-10-CM

## 2021-03-26 DIAGNOSIS — L08.9 INFECTED EPITHELIAL INCLUSION CYST: ICD-10-CM

## 2021-03-26 PROCEDURE — 99203 OFFICE O/P NEW LOW 30 MIN: CPT | Performed by: SURGERY

## 2021-04-01 ENCOUNTER — OFFICE VISIT (OUTPATIENT)
Dept: FAMILY MEDICINE CLINIC | Facility: CLINIC | Age: 66
End: 2021-04-01
Payer: MEDICARE

## 2021-04-01 VITALS
BODY MASS INDEX: 33.5 KG/M2 | OXYGEN SATURATION: 92 % | SYSTOLIC BLOOD PRESSURE: 122 MMHG | TEMPERATURE: 97.7 F | HEIGHT: 70 IN | WEIGHT: 234 LBS | HEART RATE: 79 BPM | DIASTOLIC BLOOD PRESSURE: 76 MMHG

## 2021-04-01 DIAGNOSIS — E11.9 TYPE 2 DIABETES MELLITUS WITHOUT COMPLICATION, WITHOUT LONG-TERM CURRENT USE OF INSULIN (HCC): ICD-10-CM

## 2021-04-01 DIAGNOSIS — E66.9 OBESITY (BMI 35.0-39.9 WITHOUT COMORBIDITY): ICD-10-CM

## 2021-04-01 DIAGNOSIS — Z00.00 MEDICARE ANNUAL WELLNESS VISIT, SUBSEQUENT: Primary | ICD-10-CM

## 2021-04-01 DIAGNOSIS — Z12.10 SPECIAL SCREENING FOR MALIGNANT NEOPLASM OF INTESTINE: ICD-10-CM

## 2021-04-01 DIAGNOSIS — I10 ESSENTIAL HYPERTENSION: ICD-10-CM

## 2021-04-01 DIAGNOSIS — E78.2 MIXED HYPERLIPIDEMIA: ICD-10-CM

## 2021-04-01 DIAGNOSIS — E11.65 UNCONTROLLED TYPE 2 DIABETES MELLITUS WITH HYPERGLYCEMIA (HCC): ICD-10-CM

## 2021-04-01 DIAGNOSIS — R35.0 URINARY FREQUENCY: ICD-10-CM

## 2021-04-01 LAB — SL AMB POCT HEMOGLOBIN AIC: 5.7 (ref ?–6.5)

## 2021-04-01 PROCEDURE — 1123F ACP DISCUSS/DSCN MKR DOCD: CPT | Performed by: FAMILY MEDICINE

## 2021-04-01 PROCEDURE — 83036 HEMOGLOBIN GLYCOSYLATED A1C: CPT | Performed by: FAMILY MEDICINE

## 2021-04-01 PROCEDURE — G0439 PPPS, SUBSEQ VISIT: HCPCS | Performed by: FAMILY MEDICINE

## 2021-04-01 RX ORDER — GLIMEPIRIDE 2 MG/1
2 TABLET ORAL
Qty: 90 TABLET | Refills: 3 | Status: SHIPPED | COMMUNITY
Start: 2021-04-01 | End: 2021-10-15

## 2021-04-01 NOTE — PROGRESS NOTES
Assessment and Plan:     Problem List Items Addressed This Visit        Endocrine    Type 2 diabetes mellitus without complication, without long-term current use of insulin (HCC)    Relevant Medications    metFORMIN (GLUCOPHAGE) 1000 MG tablet    glimepiride (AMARYL) 2 mg tablet    Other Relevant Orders    POCT hemoglobin A1c (Completed)    Comprehensive metabolic panel    Microalbumin / creatinine urine ratio       Cardiovascular and Mediastinum    Essential hypertension    Relevant Orders    CBC and differential    Comprehensive metabolic panel    Lipid Panel with Direct LDL reflex    TSH, 3rd generation with Free T4 reflex       Other    Mixed hyperlipidemia    Relevant Orders    Comprehensive metabolic panel    Lipid Panel with Direct LDL reflex    TSH, 3rd generation with Free T4 reflex      Other Visit Diagnoses     Medicare annual wellness visit, subsequent    -  Primary    BMI 32 0-32 9,adult        Urinary frequency        Relevant Orders    PSA, total and free    Obesity (BMI 35 0-39 9 without comorbidity)        Relevant Orders    CBC and differential    Comprehensive metabolic panel    Lipid Panel with Direct LDL reflex    TSH, 3rd generation with Free T4 reflex    Uncontrolled type 2 diabetes mellitus with hyperglycemia (HCC)        Relevant Medications    metFORMIN (GLUCOPHAGE) 1000 MG tablet    glimepiride (AMARYL) 2 mg tablet    Special screening for malignant neoplasm of intestine        Relevant Orders    Ambulatory referral to Gastroenterology           Preventive health issues were discussed with patient, and age appropriate screening tests were ordered as noted in patient's After Visit Summary  Personalized health advice and appropriate referrals for health education or preventive services given if needed, as noted in patient's After Visit Summary       History of Present Illness:     Patient presents for Medicare Annual Wellness visit    Patient Care Team:  Devora Potter DO as PCP - General     Problem List:     Patient Active Problem List   Diagnosis    Essential hypertension    Thrombocytopenia (HCC)    Vitamin D deficiency    Chronic tension-type headache, not intractable    Type 2 diabetes mellitus without complication, without long-term current use of insulin ([facility] )    Controlled type 2 diabetes mellitus without complication, without long-term current use of insulin ([facility] )    Mixed hyperlipidemia      Past Medical and Surgical History:     History reviewed  No pertinent past medical history  History reviewed  No pertinent surgical history  Family History:     Family History   Problem Relation Age of Onset    Hypertension Father       Social History:        Social History     Socioeconomic History    Marital status: Single     Spouse name: None    Number of children: None    Years of education: None    Highest education level: None   Occupational History    None   Social Needs    Financial resource strain: None    Food insecurity     Worry: None     Inability: None    Transportation needs     Medical: None     Non-medical: None   Tobacco Use    Smoking status: Never Smoker    Smokeless tobacco: Never Used   Substance and Sexual Activity    Alcohol use:  Yes     Alcohol/week: 1 0 standard drinks     Types: 1 Standard drinks or equivalent per week     Frequency: 2-4 times a month     Drinks per session: 1 or 2     Binge frequency: Never    Drug use: No    Sexual activity: None   Lifestyle    Physical activity     Days per week: None     Minutes per session: None    Stress: None   Relationships    Social connections     Talks on phone: None     Gets together: None     Attends Anabaptism service: None     Active member of club or organization: None     Attends meetings of clubs or organizations: None     Relationship status: None    Intimate partner violence     Fear of current or ex partner: None     Emotionally abused: None     Physically abused: None     Forced sexual activity: None   Other Topics Concern    None   Social History Narrative    None      Medications and Allergies:     Current Outpatient Medications   Medication Sig Dispense Refill    Blood Glucose Monitoring Suppl (FreeStyle Lite) TAMMY by Percutaneous route 2 (two) times a day 1 each 0    glimepiride (AMARYL) 2 mg tablet Take 1 tablet (2 mg total) by mouth daily with breakfast 90 tablet 3    glucose blood (FREESTYLE LITE) test strip For bid testing 200 each 3    glucose blood (FREESTYLE LITE) test strip TEST AS DIRECTED 200 each 2    glucose monitoring kit (FREESTYLE) monitoring kit 1 each by Does not apply route 2 (two) times a day before lunch and dinner E11 65 1 each 0    Lancets (freestyle) lancets Use as instructed E11 65 testing twice a day 100 each 0    metFORMIN (GLUCOPHAGE) 1000 MG tablet Take 1 tablet (1,000 mg total) by mouth 2 (two) times a day with meals 180 tablet 1     No current facility-administered medications for this visit  No Known Allergies   Immunizations:     Immunization History   Administered Date(s) Administered    Tdap 08/17/2015      Health Maintenance:         Topic Date Due    Colorectal Cancer Screening  Never done    Hepatitis C Screening  Completed         Topic Date Due    COVID-19 Vaccine (1) Never done      Medicare Health Risk Assessment:     /76   Pulse 79   Temp 97 7 °F (36 5 °C)   Ht 5' 10" (1 778 m)   Wt 106 kg (234 lb)   SpO2 92%   BMI 33 58 kg/m²      Darin Leslie is here for his Subsequent Wellness visit  Health Risk Assessment:   Patient rates overall health as excellent  Patient feels that their physical health rating is slightly better  Patient is satisfied with their life  Eyesight was rated as same  Hearing was rated as same  Patient feels that their emotional and mental health rating is same  Patients states they are never, rarely angry  Patient states they are never, rarely unusually tired/fatigued   Pain experienced in the last 7 days has been none  Patient states that he has experienced no weight loss or gain in last 6 months  Depression Screening:   PHQ-2 Score: 0      Fall Risk Screening: In the past year, patient has experienced: no history of falling in past year      Home Safety:  Patient does not have trouble with stairs inside or outside of their home  Patient has working smoke alarms and has working carbon monoxide detector  Home safety hazards include: none  Nutrition:   Current diet is Regular and Low Carb  Medications:   Patient is not currently taking any over-the-counter supplements  Patient is able to manage medications  Activities of Daily Living (ADLs)/Instrumental Activities of Daily Living (IADLs):   Walk and transfer into and out of bed and chair?: Yes  Dress and groom yourself?: Yes    Bathe or shower yourself?: Yes    Feed yourself?  Yes  Do your laundry/housekeeping?: Yes  Manage your money, pay your bills and track your expenses?: Yes  Make your own meals?: Yes    Do your own shopping?: Yes    Previous Hospitalizations:   Any hospitalizations or ED visits within the last 12 months?: No      Advance Care Planning:   Living will: No    Durable POA for healthcare: No    Advanced directive: No      Comments: Living will information provided     PREVENTIVE SCREENINGS      Cardiovascular Screening:    General: Screening Not Indicated and History Lipid Disorder      Diabetes Screening:     General: Screening Not Indicated, History Diabetes and Risks and Benefits Discussed      Colorectal Cancer Screening:     General: Risks and Benefits Discussed      Prostate Cancer Screening:    General: Screening Current      Abdominal Aortic Aneurysm (AAA) Screening:    Risk factors include: age between 73-67 yo        Lung Cancer Screening:     General: Screening Not Indicated      Hepatitis C Screening:    General: Screening Current      Preventive Screening Comments: Patient due for his eye exam and encouraged to schedule appointment  Patient aware of the risks/benefits of a colonoscopy and encouraged to schedule appointment    Screening, Brief Intervention, and Referral to Treatment (SBIRT)    Screening  Typical number of drinks in a day: 1  Typical number of drinks in a week: 2  Interpretation: Low risk drinking behavior  Single Item Drug Screening:  How often have you used an illegal drug (including marijuana) or a prescription medication for non-medical reasons in the past year? never    Single Item Drug Screen Score: 0  Interpretation: Negative screen for possible drug use disorder    Other Counseling Topics:   Regular weightbearing exercise and calcium and vitamin D intake         Joesph Mcdaniel, DO

## 2021-04-01 NOTE — PATIENT INSTRUCTIONS

## 2021-04-22 VITALS — HEIGHT: 70 IN | BODY MASS INDEX: 33.5 KG/M2 | WEIGHT: 234 LBS

## 2021-04-22 NOTE — TELEPHONE ENCOUNTER
Why does your doctor want you to have this procedure? Screening    Do you have kidney disease?  no  If yes, are you on dialysis :     Have you had diverticulitis within the past 2 months? no    Are you diabetic?  yes  If yes, insulin dependent: If yes, provide diabetic instructions sheet     Do take iron supplements?  no  If yes, instruct patient to hold iron supplement for 7 days prior    Are you on a blood thinner? no   Was the blood thinner sheet complete and faxed to cardiologist no  Plavix (clopidogrel), Coumadin (warfarin), Lovenox (enoxaparin), Xarelto (rivaroxaban), Pradaxa(dabigatran), Eliquis(apixaban) Savaysa/Lixiana (edoxapan)    Do you have an automatic implantable cardiac defibrillator (AICD)/pacemaker ([facility])? no  Was AICD/pacemaker sheet completed and faxed to cardiologist? no    Are you on home oxygen? no  If yes, continuous or nocturnal:     Have you been treated for MRSA, VRE or any communicable diseases? no    Heart attack, stroke, or stent within 3 months? no  Schedule at Hospital if within 3-6 months   Use nitroglycerin for chest pain in the last 6 months? no    History of organ  transplant?  no   If yes, notify Endo      History of neck/throat/tongue surgery or cancer? no  IF yes, notify Endo      Any problems with anesthesia in the past? no     Was stool C diff ordered?  no Stool specimen needs to be completed prior to procedure    Do have any facial or body piercings?no     Do you have a latex allergy? no     Do have an allergy to metals? (Bravo study only) no     If pediatric patient, was consent faxed to pediatrician no     Patient rights reviewed yes     Miralax prep given and instructions emailed to patient

## 2021-04-26 NOTE — PROGRESS NOTES
Assessment/Plan:   Cadence Guzmán is a 78 y o male who is here for Cyst (New patient referred by his PCP, Johana Mayes  Had incision and drainage of infected cyst behind right ear done by her on 3/22/01  No tenderness at site states patient  Is had to touch  No red or drainage presently  Currently on Bactrim  Last dose will be on Sunday )    Plan: Cyst - discussed that given recent infection patient should consider excision to prevent future infection, at this time no need for further I+D, consider excision in few weeks to prevent future infections patient will schedule followup     Preoperative Clearance: None    HPI:  Cadence Guzmán is a 78 y o male who was referred for evaluation of Cyst (New patient referred by his PCP, Johana Mayes  Had incision and drainage of infected cyst behind right ear done by her on 3/22/01  No tenderness at site states patient  Is had to touch  No red or drainage presently  Currently on Bactrim  Last dose will be on Sunday )    Currently no pain  ROS:  General ROS: negative  negative for - chills, fatigue, fever or night sweats, weight loss  Respiratory ROS: no cough, shortness of breath, or wheezing  Cardiovascular ROS: no chest pain or dyspnea on exertion  Genito-Urinary ROS: no dysuria, trouble voiding, or hematuria  Musculoskeletal ROS: negative for - gait disturbance, joint pain or muscle pain  Neurological ROS: no TIA or stroke symptoms  Cyst of right ear    [unfilled]  Patient has no known allergies      Current Outpatient Medications:     Blood Glucose Monitoring Suppl (FreeStyle Lite) TAMMY, by Percutaneous route 2 (two) times a day, Disp: 1 each, Rfl: 0    glucose blood (FREESTYLE LITE) test strip, For bid testing, Disp: 200 each, Rfl: 3    glucose blood (FREESTYLE LITE) test strip, TEST AS DIRECTED, Disp: 200 each, Rfl: 2    glucose monitoring kit (FREESTYLE) monitoring kit, 1 each by Does not apply route 2 (two) times a day before lunch and dinner E11 65, Disp: 1 each, Rfl: 0    Lancets (freestyle) lancets, Use as instructed E11 65 testing twice a day, Disp: 100 each, Rfl: 0    glimepiride (AMARYL) 2 mg tablet, Take 1 tablet (2 mg total) by mouth daily with breakfast, Disp: 90 tablet, Rfl: 3    metFORMIN (GLUCOPHAGE) 1000 MG tablet, Take 1 tablet (1,000 mg total) by mouth 2 (two) times a day with meals, Disp: 180 tablet, Rfl: 1  Past Medical History:   Diagnosis Date    Diabetes mellitus ([facility] [address] )      No past surgical history on file  Family History   Problem Relation Age of Onset    Hypertension Father     Colon cancer Neg Hx     Colon polyps Neg Hx       reports that he has never smoked  He has never used smokeless tobacco  He reports current alcohol use of about 1 0 standard drinks of alcohol per week  He reports that he does not use drugs  Labs:   Lab Results   Component Value Date    WBC 14 37 (H) 05/20/2020    WBC 8 9 10/22/2018    HGB 15 8 05/20/2020    HGB 17 1 10/22/2018     05/20/2020     10/22/2018     Lab Results   Component Value Date    ALT 27 05/20/2020    ALT 36 10/22/2018    AST 7 05/20/2020    AST 24 10/22/2018     This SmartLink has not been configured with any valid records  PHYSICAL EXAM  General Appearance:    Alert, cooperative, no distress,    Head:    Normocephalic without obvious abnormality   Eyes:    PERRL, conjunctiva/corneas clear, EOM's intact        Neck:   Supple, no adenopathy, no JVD   Back:     Symmetric, no spinal or CVA tenderness   Lungs:     Clear to auscultation bilaterally, no wheezing or rhonchi   Heart:    Regular rate and rhythm, S1 and S2 normal, no murmur   Abdomen:        Extremities:   Extremities normal  No clubbing, cyanosis or edema   Psych:   Normal Affect, AOx3  Neurologic:  Skin:   CNII-XII intact   Strength symmetric, speech intact    Warm, dry, intact, posterior right ear cyst no sign of active infection         Physical Exam              Some portions of this record may have been generated with voice recognition software  There may be translation, syntax,  or grammatical errors  Occasional wrong word or "sound-a-like" substitutions may have occurred due to the inherent limitations of the voice recognition software  Read the chart carefully and recognize, using context, where substitutions may have occurred  If you have any questions, please contact the dictating provider for clarification or correction, as needed  This encounter has been coded by a non-certified coder

## 2021-04-27 ENCOUNTER — HOSPITAL ENCOUNTER (OUTPATIENT)
Dept: GASTROENTEROLOGY | Facility: AMBULATORY SURGERY CENTER | Age: 66
Discharge: HOME | End: 2021-04-27
Payer: MEDICARE

## 2021-04-27 ENCOUNTER — ANESTHESIA (OUTPATIENT)
Dept: GASTROENTEROLOGY | Facility: AMBULATORY SURGERY CENTER | Age: 66
End: 2021-04-27

## 2021-04-27 ENCOUNTER — ANESTHESIA EVENT (OUTPATIENT)
Dept: GASTROENTEROLOGY | Facility: AMBULATORY SURGERY CENTER | Age: 66
End: 2021-04-27

## 2021-04-27 VITALS
DIASTOLIC BLOOD PRESSURE: 94 MMHG | TEMPERATURE: 98.2 F | HEART RATE: 62 BPM | RESPIRATION RATE: 18 BRPM | SYSTOLIC BLOOD PRESSURE: 139 MMHG | OXYGEN SATURATION: 97 %

## 2021-04-27 DIAGNOSIS — Z12.11 SCREENING FOR COLON CANCER: ICD-10-CM

## 2021-04-27 PROCEDURE — 45385 COLONOSCOPY W/LESION REMOVAL: CPT | Performed by: INTERNAL MEDICINE

## 2021-04-27 PROCEDURE — 88305 TISSUE EXAM BY PATHOLOGIST: CPT | Performed by: PATHOLOGY

## 2021-04-27 RX ORDER — SODIUM CHLORIDE 9 MG/ML
50 INJECTION, SOLUTION INTRAVENOUS CONTINUOUS
Status: DISCONTINUED | OUTPATIENT
Start: 2021-04-27 | End: 2021-05-01 | Stop reason: HOSPADM

## 2021-04-27 RX ORDER — PROPOFOL 10 MG/ML
INJECTION, EMULSION INTRAVENOUS AS NEEDED
Status: DISCONTINUED | OUTPATIENT
Start: 2021-04-27 | End: 2021-04-27

## 2021-04-27 RX ADMIN — PROPOFOL 150 MG: 10 INJECTION, EMULSION INTRAVENOUS at 07:31

## 2021-04-27 RX ADMIN — PROPOFOL 100 MG: 10 INJECTION, EMULSION INTRAVENOUS at 07:37

## 2021-04-27 RX ADMIN — SODIUM CHLORIDE 50 ML/HR: 9 INJECTION, SOLUTION INTRAVENOUS at 07:18

## 2021-04-27 RX ADMIN — SODIUM CHLORIDE: 9 INJECTION, SOLUTION INTRAVENOUS at 07:20

## 2021-04-27 RX ADMIN — PROPOFOL 50 MG: 10 INJECTION, EMULSION INTRAVENOUS at 07:42

## 2021-04-27 NOTE — ANESTHESIA POSTPROCEDURE EVALUATION
Post-Op Assessment Note    CV Status:  Stable    Pain management: adequate     Mental Status:  Sleepy   Hydration Status:  Euvolemic   PONV Controlled:  Controlled   Airway Patency:  Patent       Post Op Vitals Reviewed: No      Staff: Anesthesiologist         No complications documented      BP     Temp      Pulse     Resp      SpO2

## 2021-04-27 NOTE — DISCHARGE INSTRUCTIONS
Colonoscopy   WHAT YOU NEED TO KNOW:   A colonoscopy is a procedure to examine the inside of your colon (intestine) with a scope  Polyps or tissue growths may have been removed during your colonoscopy  It is normal to feel bloated and to have some abdominal discomfort  You should be passing gas  If you have hemorrhoids or you had polyps removed, you may have a small amount of bleeding  DISCHARGE INSTRUCTIONS:   Seek care immediately if:    You have sudden, severe abdominal pain   You have problems swallowing   You have a large amount of black, sticky bowel movements or blood in your bowel movements   You have sudden trouble breathing   You feel weak, lightheaded, or faint or your heart beats faster than normal for you  Contact your healthcare provider if:    You have a fever and chills   You have nausea or are vomiting   Your abdomen is bloated or feels full and hard   You have abdominal pain   You have black, sticky bowel movements or blood in your bowel movements   You have not had a bowel movement for 3 days after your procedure   You have rash or hives   You have questions or concerns about your procedure  Activity:    Do not lift, strain, or run for 24 hours after your procedure   Rest after your procedure  You have been given medicine to relax you  Do not drive or make important decisions until the day after your procedure  Return to your normal activity as directed   Relieve gas and discomfort from bloating by lying on your right side with a heating pad on your abdomen  You may need to take short walks to help the gas move out  Eat small meals until bloating is relieved  Follow up with your healthcare provider as directed: Write down your questions so you remember to ask them during your visits  If you take a blood thinner, please review the specific instructions from your endoscopist about when you should resume it   These can be found in the Recommendation and Your Medication list sections of this After Visit Summary  Hemorrhoids   WHAT YOU NEED TO KNOW:   What are hemorrhoids? Hemorrhoids are swollen blood vessels inside your rectum (internal hemorrhoids) or on your anus (external hemorrhoids)  Sometimes a hemorrhoid may prolapse  This means it extends out of your anus  What increases my risk for hemorrhoids? · Pregnancy or obesity    · Straining or sitting for a long time during bowel movements    · Liver disease    · Weak muscles around the anus caused by older age, rectal surgery, or anal intercourse    · A lack of physical activity    · Chronic diarrhea or constipation    · A low-fiber diet    What are the signs and symptoms of hemorrhoids? · Pain or itching around your anus or inside your rectum    · Swelling or bumps around your anus    · Bright red blood in your bowel movement, on the toilet paper, or in the toilet bowl    · Tissue bulging out of your anus (prolapsed hemorrhoids)    · Incontinence (poor control over urine or bowel movements)    How are hemorrhoids diagnosed? Your healthcare provider will ask about your symptoms, the foods you eat, and your bowel movements  He or she will examine your anus for external hemorrhoids  You may need the following:  · A digital rectal exam  is a test to check for hemorrhoids  Your healthcare provider will put a gloved finger inside your anus to feel for the hemorrhoids  · An anoscopy  is a test that uses a scope (small tube with a light and camera on the end) to look at your hemorrhoids  How are hemorrhoids treated? Treatment will depend on your symptoms  You may need any of the following:  · Medicines  can help decrease pain and swelling, and soften your bowel movement  The medicine may be a pill, pad, cream, or ointment  · Procedures  may be used to shrink or remove your hemorrhoid  Examples include rubber-band ligation, sclerotherapy, and photocoagulation  These procedures may be done in your healthcare provider's office  Ask your healthcare provider for more information about these procedures  · Surgery  may be needed to shrink or remove your hemorrhoids  How can I manage my symptoms? · Apply ice on your anus for 15 to 20 minutes every hour or as directed  Use an ice pack, or put crushed ice in a plastic bag  Cover it with a towel before you apply it to your anus  Ice helps prevent tissue damage and decreases swelling and pain  · Take a sitz bath  Fill a bathtub with 4 to 6 inches of warm water  You may also use a sitz bath pan that fits inside a toilet bowl  Sit in the sitz bath for 15 minutes  Do this 3 times a day, and after each bowel movement  The warm water can help decrease pain and swelling  · Keep your anal area clean  Gently wash the area with warm water daily  Soap may irritate the area  After a bowel movement, wipe with moist towelettes or wet toilet paper  Dry toilet paper can irritate the area  How can I help prevent hemorrhoids? · Do not strain to have a bowel movement  Do not sit on the toilet too long  These actions can increase pressure on the tissues in your rectum and anus  · Drink plenty of liquids  Liquids can help prevent constipation  Ask how much liquid to drink each day and which liquids are best for you  · Eat a variety of high-fiber foods  Examples include fruits, vegetables, and whole grains  Ask your healthcare provider how much fiber you need each day  You may need to take a fiber supplement  · Exercise as directed  Exercise, such as walking, may make it easier to have a bowel movement  Ask your healthcare provider to help you create an exercise plan  · Do not have anal sex  Anal sex can weaken the skin around your rectum and anus  · Avoid heavy lifting  This can cause straining and increase your risk for another hemorrhoid  When should I seek immediate care?    · You have severe pain in your rectum or around your anus  · You have severe pain in your abdomen and you are vomiting  · You have bleeding from your anus that soaks through your underwear  When should I contact my healthcare provider? · You have frequent and painful bowel movements  · Your hemorrhoid looks or feels more swollen than usual      · You do not have a bowel movement for 2 days or more  · You see or feel tissue coming through your anus  · You have questions or concerns about your condition or care  CARE AGREEMENT:   You have the right to help plan your care  Learn about your health condition and how it may be treated  Discuss treatment options with your healthcare providers to decide what care you want to receive  You always have the right to refuse treatment  The above information is an  only  It is not intended as medical advice for individual conditions or treatments  Talk to your doctor, nurse or pharmacist before following any medical regimen to see if it is safe and effective for you  © Copyright [address] Information is for End User's use only and may not be sold, redistributed or otherwise used for commercial purposes  All illustrations and images included in CareNotes® are the copyrighted property of A D A M , Inc  or [facility] [employer]   Diverticulosis   WHAT YOU NEED TO KNOW:   What is diverticulosis? Diverticulosis is a condition that causes small pockets called diverticula to form in your intestine  These pockets make it difficult for bowel movements to pass through your digestive system  What causes diverticulosis? Diverticula form when muscles have to work hard to move bowel movements through the intestine  The force causes bulges to form at weak areas in the intestine  This may happen if you eat foods that are low in fiber  Fiber helps give your bowel movements more bulk so they are larger and easier to move through your colon   The following may increase your risk of diverticulosis:  · A history of constipation    · Age 36 or older    · Obesity    · Lack of exercise    What are the signs and symptoms of diverticulosis? Diverticulosis usually does not cause any signs or symptoms  It may cause any of the following in some people:  · Pain or discomfort in your lower abdomen    · Abdominal bloating    · Constipation or diarrhea    How is diverticulosis diagnosed? Your healthcare provider will examine you and ask about your bowel movements, diet, and symptoms  He or she will also ask about any medical conditions you have or medicines you take  You may need any of the following:  · Blood tests  may be done to check for signs of inflammation  · A barium enema  is an x-ray of your colon that may show diverticula  A tube is put into your anus, and a liquid called barium is put through the tube  Barium is used so that healthcare providers can see your colon more clearly  · Flexible sigmoidoscopy  is a test to look for any changes in your lower intestines and rectum  It may also show the cause of any bleeding or pain  A soft, bendable tube with a light on the end will be put into your anus  It will then be moved forward into your intestine  · A colonoscopy  is used to look at your whole colon  A scope (long bendable tube with a light on the end) is used to take pictures  This test may show diverticula  · A CT scan , or CAT scan, may show diverticula  You may be given contrast liquid before the scan  Tell the healthcare provider if you have ever had an allergic reaction to contrast liquid  How is diverticulosis managed? The goal of treatment is to manage any symptoms you have and prevent other problems such as diverticulitis  Diverticulitis is swelling or infection of the diverticula  Your healthcare provider may recommend any of the following:  · Eat a variety of high-fiber foods  High-fiber foods help you have regular bowel movements  High-fiber foods include cooked beans, fruits, vegetables, and some cereals  Most adults need 25 to 35 grams of fiber each day  Your healthcare provider may recommend that you have more  Ask your healthcare provider how much fiber you need  Increase fiber slowly  You may have abdominal discomfort, bloating, and gas if you add fiber to your diet too quickly  You may need to take a fiber supplement if you are not getting enough fiber from food  · Medicines  to soften your bowel movements may be given  You may also need medicines to treat symptoms such as bloating and pain  · Drink liquids as directed  You may need to drink 2 to 3 liters (8 to 12 cups) of liquids every day  Ask your healthcare provider how much liquid to drink each day and which liquids are best for you  · Apply heat  on your abdomen for 20 to 30 minutes every 2 hours for as many days as directed  Heat helps decrease pain and muscle spasms  How can I help prevent diverticulitis or other symptoms? The following may help decrease your risk for diverticulitis or symptoms, such as bleeding  Talk to your provider about these or other things you can do to prevent problems that may occur with diverticulosis  · Exercise regularly  Ask your healthcare provider about the best exercise plan for you  Exercise can help you have regular bowel movements  Get 30 minutes of exercise on most days of the week  · Maintain a healthy weight  Ask your healthcare provider how much you should weigh  Ask him or her to help you create a weight loss plan if you are overweight  · Do not smoke  Nicotine and other chemicals in cigarettes increase your risk for diverticulitis  Ask your healthcare provider for information if you currently smoke and need help to quit  E-cigarettes or smokeless tobacco still contain nicotine  Talk to your healthcare provider before you use these products  · Ask your healthcare provider if it is safe to take NSAIDs  NSAIDs may increase your risk of diverticulitis  When should I seek immediate care? · You have severe pain on the left side of your lower abdomen  · Your bowel movements are bright or dark red  When should I contact my healthcare provider? · You have a fever and chills  · You feel dizzy or lightheaded  · You have nausea, or you are vomiting  · You have a change in your bowel movements  · You have questions or concerns about your condition or care  CARE AGREEMENT:   You have the right to help plan your care  Learn about your health condition and how it may be treated  Discuss treatment options with your healthcare providers to decide what care you want to receive  You always have the right to refuse treatment  The above information is an  only  It is not intended as medical advice for individual conditions or treatments  Talk to your doctor, nurse or pharmacist before following any medical regimen to see if it is safe and effective for you  © Copyright [address] Information is for End User's use only and may not be sold, redistributed or otherwise used for commercial purposes  All illustrations and images included in CareNotes® are the copyrighted property of [employer]  or [facility]  Gastric Polyps   WHAT YOU NEED TO KNOW:   What are gastric polyps? Gastric polyps are growths that form in the lining of your stomach  They are not cancerous, but certain types of polyps can change into cancer  What puts me at risk for gastric polyps? · Chronic gastritis caused by NSAIDs use or ulcers    · Long-term use of proton pump inhibitor medicines (used to decrease stomach acid)    · An infection in your stomach caused by H  pylori bacteria    What are the symptoms of gastric polyps? You may have no symptoms   Large polyps may cause any of the following:  · Abdominal pain    · Indigestion    · Vomiting after meals or vomiting blood    · Dark or bloody bowel movements    How are gastric polyps diagnosed? Gastric polyps are usually found during an endoscopy for another reason  All or part of the polyp will be removed during the test  Your healthcare provider may also remove tissue from your stomach  The polyps and tissue are sent to the lab for testing  How are gastric polyps treated? Some types of polyps go away on their own  Other types may be removed if they are large, you have symptoms, or abnormal cells are found  Large polyps and abnormal cells increase your risk for cancer  You may also need antibiotics if you have an infection caused by H  pylori bacteria  Part of your stomach may be removed if the polyps cannot be removed and abnormal cells are found  When should I seek immediate care? · You have blood in your vomit  · You have dark or bloody bowel movements  · You have severe pain in your abdomen that does not go away after you take medicine  When should I contact my healthcare provider? · You have indigestion that does not go away with treatment  · You vomit after meals  · You have questions or concerns about your condition or care  CARE AGREEMENT:   You have the right to help plan your care  Learn about your health condition and how it may be treated  Discuss treatment options with your healthcare providers to decide what care you want to receive  You always have the right to refuse treatment  The above information is an  only  It is not intended as medical advice for individual conditions or treatments  Talk to your doctor, nurse or pharmacist before following any medical regimen to see if it is safe and effective for you  © Copyright [address] Information is for End User's use only and may not be sold, redistributed or otherwise used for commercial purposes   All illustrations and images included in CareNotes® are the copyrighted property of [employer]  or [address] [place] [address] [employer]

## 2021-04-27 NOTE — H&P
History and Physical - [facility]  Perm Escoto 78 y o  male MRN: [id number]    HPI: Prem Escoto is a 78y o  year old male who presents for screening colonoscopy  He is average risk  REVIEW OF SYSTEMS: Per the HPI, and otherwise unremarkable  Historical Information   Past Medical History:   Diagnosis Date    Diabetes mellitus ([facility] [address] )      History reviewed  No pertinent surgical history  Social History   Social History     Substance and Sexual Activity   Alcohol Use Yes    Alcohol/week: 1 0 standard drinks    Types: 1 Standard drinks or equivalent per week    Frequency: 2-4 times a month    Drinks per session: 1 or 2    Binge frequency: Never     Social History     Substance and Sexual Activity   Drug Use No     Social History     Tobacco Use   Smoking Status Never Smoker   Smokeless Tobacco Never Used     Family History   Problem Relation Age of Onset    Hypertension Father     Colon cancer Neg Hx     Colon polyps Neg Hx        Meds/Allergies       Current Outpatient Medications:     glimepiride (AMARYL) 2 mg tablet    metFORMIN (GLUCOPHAGE) 1000 MG tablet    Blood Glucose Monitoring Suppl (FreeStyle Lite) TAMMY    glucose blood (FREESTYLE LITE) test strip    glucose blood (FREESTYLE LITE) test strip    glucose monitoring kit (FREESTYLE) monitoring kit    Lancets (freestyle) lancets    Current Facility-Administered Medications:     sodium chloride 0 9 % infusion, 50 mL/hr, Intravenous, Continuous, New Bag at 04/27/21 0720    No Known Allergies    Objective     BP (!) 160/101   Pulse 71   Temp 98 2 °F (36 8 °C) (Temporal)   Resp 16   SpO2 96%     PHYSICAL EXAM    Gen: NAD AAOx3  CV: S1S2 RRR no m/r/g  CHEST: Clear b/l no c/r/w  ABD: soft, +BS NT/ND  EXT: no edema    ASSESSMENT/PLAN:  This is a 78y o  year old male here for screening colonoscopy, and he is stable and optimized for his procedure

## 2021-04-27 NOTE — ANESTHESIA PREPROCEDURE EVALUATION
Procedure:  COLONOSCOPY    Relevant Problems   CARDIO   (+) Essential hypertension   (+) Mixed hyperlipidemia      ENDO   (+) Controlled type 2 diabetes mellitus without complication, without long-term current use of insulin (HCC)   (+) Type 2 diabetes mellitus without complication, without long-term current use of insulin (HCC)      HEMATOLOGY   (+) Thrombocytopenia (HCC)      NEURO/PSYCH   (+) Chronic tension-type headache, not intractable        Physical Exam    Airway    Mallampati score: II  TM Distance: >3 FB  Neck ROM: full     Dental       Cardiovascular  Rhythm: regular, Rate: normal, Cardiovascular exam normal    Pulmonary  Pulmonary exam normal     Other Findings        Anesthesia Plan  ASA Score- 2     Anesthesia Type- IV sedation with anesthesia with ASA Monitors  Additional Monitors:   Airway Plan:           Plan Factors-    Chart reviewed  Induction- intravenous  Postoperative Plan-     Informed Consent- Anesthetic plan and risks discussed with patient

## 2021-04-29 ENCOUNTER — PROCEDURE VISIT (OUTPATIENT)
Dept: SURGERY | Facility: HOSPITAL | Age: 66
End: 2021-04-29
Payer: MEDICARE

## 2021-04-29 VITALS
SYSTOLIC BLOOD PRESSURE: 179 MMHG | DIASTOLIC BLOOD PRESSURE: 117 MMHG | BODY MASS INDEX: 33.79 KG/M2 | HEART RATE: 87 BPM | TEMPERATURE: 97.6 F | HEIGHT: 70 IN | WEIGHT: 236 LBS

## 2021-04-29 DIAGNOSIS — L72.9 SKIN CYST: Primary | ICD-10-CM

## 2021-04-29 PROCEDURE — 88304 TISSUE EXAM BY PATHOLOGIST: CPT | Performed by: PATHOLOGY

## 2021-04-29 PROCEDURE — 11420 EXC H-F-NK-SP B9+MARG 0.5/<: CPT | Performed by: SURGERY

## 2021-04-29 PROCEDURE — 99212 OFFICE O/P EST SF 10 MIN: CPT | Performed by: SURGERY

## 2021-05-01 NOTE — RESULT ENCOUNTER NOTE
I called the patient and left a voice message  Patient did have a adenoma in the sigmoid colon  It did measure 10 mm  Therefore follow-up exam should be in 3 years  Should have Suprep or an alternative prep and not the MiraLax prep for next exam for better visualization

## 2021-05-21 NOTE — PROGRESS NOTES
Assessment/Plan:   Oh Mackenzie is a 78 y o male who is here for Procedure (Office Procedure: Patient having excision of skin cyst from behind right ear excised in the office today )    Plan: Scalp cyst - posterior to ear, excised without issues, tor procedure well, wound instructions given, path pending, will followup in two weeks    Preoperative Clearance: None    HPI:  Oh Mackenzie is a 78 y o male who was referred for evaluation of Procedure (Office Procedure: Patient having excision of skin cyst from behind right ear excised in the office today )    Currently no changes in cyst since last visit  ROS:  General ROS: negative  negative for - chills, fatigue, fever or night sweats, weight loss  Respiratory ROS: no cough, shortness of breath, or wheezing  Cardiovascular ROS: no chest pain or dyspnea on exertion  Genito-Urinary ROS: no dysuria, trouble voiding, or hematuria  Musculoskeletal ROS: negative for - gait disturbance, joint pain or muscle pain  Neurological ROS: no TIA or stroke symptoms  Skin cyst    [unfilled]  Patient has no known allergies      Current Outpatient Medications:     Blood Glucose Monitoring Suppl (FreeStyle Lite) TAMMY, by Percutaneous route 2 (two) times a day, Disp: 1 each, Rfl: 0    glimepiride (AMARYL) 2 mg tablet, Take 1 tablet (2 mg total) by mouth daily with breakfast, Disp: 90 tablet, Rfl: 3    glucose blood (FREESTYLE LITE) test strip, For bid testing, Disp: 200 each, Rfl: 3    glucose blood (FREESTYLE LITE) test strip, TEST AS DIRECTED, Disp: 200 each, Rfl: 2    glucose monitoring kit (FREESTYLE) monitoring kit, 1 each by Does not apply route 2 (two) times a day before lunch and dinner E11 65, Disp: 1 each, Rfl: 0    Lancets (freestyle) lancets, Use as instructed E11 65 testing twice a day, Disp: 100 each, Rfl: 0    metFORMIN (GLUCOPHAGE) 1000 MG tablet, Take 1 tablet (1,000 mg total) by mouth 2 (two) times a day with meals, Disp: 180 tablet, Rfl: 1  Past Medical History:   Diagnosis Date    Cyst of skin 04/29/2021    Behind right ear    Diabetes mellitus [facility])      Past Surgical History:   Procedure Laterality Date    CYST REMOVAL Right 04/29/2021    Excision of skin cyst from behind right ear  Office procedure     Family History   Problem Relation Age of Onset    Hypertension Father     Colon cancer Neg Hx     Colon polyps Neg Hx       reports that he has never smoked  He has never used smokeless tobacco  He reports current alcohol use of about 1 0 standard drinks of alcohol per week  He reports that he does not use drugs  Labs:   Lab Results   Component Value Date    WBC 14 37 (H) 05/20/2020    WBC 8 9 10/22/2018    HGB 15 8 05/20/2020    HGB 17 1 10/22/2018     05/20/2020     10/22/2018     Lab Results   Component Value Date    ALT 27 05/20/2020    ALT 36 10/22/2018    AST 7 05/20/2020    AST 24 10/22/2018     This SmartLink has not been configured with any valid records  PHYSICAL EXAM  General Appearance:    Alert, cooperative, no distress,    Head:    Normocephalic without obvious abnormality   Eyes:    PERRL, conjunctiva/corneas clear, EOM's intact        Neck:   Supple, no adenopathy, no JVD   Back:     Symmetric, no spinal or CVA tenderness   Lungs:     Clear to auscultation bilaterally, no wheezing or rhonchi   Heart:    Regular rate and rhythm, S1 and S2 normal, no murmur   Abdomen:        Extremities:   Extremities normal  No clubbing, cyanosis or edema   Psych:   Normal Affect, AOx3  Neurologic:  Skin:   CNII-XII intact  Strength symmetric, speech intact    Warm, dry, intact, skin cyst 2x1  5x1 5cm         Physical Exam      Skin excision    Date/Time: 4/29/2021 9:14 AM  Performed by: Chrissy Vasquez MD  Authorized by: Chrissy Vasquez MD   Universal Protocol:  Consent: Verbal consent obtained  Written consent obtained    Risks and benefits: risks, benefits and alternatives were discussed  Consent given by: patient  Patient identity confirmed: verbally with patient      Procedure Details - Skin Excision:     Number of Lesions:  1  Lesion 1:     Body area:  Head/neck    Head/neck location:  Scalp (postreior to right ear)    Skin lesion 1 size (mm): 2x1  5x1 5cm  Final defect size (mm): 3x2x1 5cm  Malignancy: benign lesion      Destruction method: scissors used for extraction      Repair type:  Linear closure     Repair Comments: Closed with 3-0 vicryl subcutaneous sutures and 4-0 monocryl subcuticular sutures    1mm margin              Some portions of this record may have been generated with voice recognition software  There may be translation, syntax,  or grammatical errors  Occasional wrong word or "sound-a-like" substitutions may have occurred due to the inherent limitations of the voice recognition software  Read the chart carefully and recognize, using context, where substitutions may have occurred  If you have any questions, please contact the dictating provider for clarification or correction, as needed  This encounter has been coded by a non-certified coder

## 2021-07-07 ENCOUNTER — OFFICE VISIT (OUTPATIENT)
Dept: FAMILY MEDICINE CLINIC | Facility: CLINIC | Age: 66
End: 2021-07-07
Payer: MEDICARE

## 2021-07-07 VITALS
SYSTOLIC BLOOD PRESSURE: 142 MMHG | BODY MASS INDEX: 35.07 KG/M2 | OXYGEN SATURATION: 98 % | HEART RATE: 82 BPM | TEMPERATURE: 98.2 F | DIASTOLIC BLOOD PRESSURE: 90 MMHG | WEIGHT: 245 LBS | HEIGHT: 70 IN

## 2021-07-07 DIAGNOSIS — E11.9 TYPE 2 DIABETES MELLITUS WITHOUT COMPLICATION, WITHOUT LONG-TERM CURRENT USE OF INSULIN (HCC): Primary | ICD-10-CM

## 2021-07-07 DIAGNOSIS — D69.6 THROMBOCYTOPENIA (HCC): ICD-10-CM

## 2021-07-07 DIAGNOSIS — L72.0 EPITHELIAL INCLUSION CYST: ICD-10-CM

## 2021-07-07 DIAGNOSIS — E11.65 UNCONTROLLED TYPE 2 DIABETES MELLITUS WITH HYPERGLYCEMIA (HCC): ICD-10-CM

## 2021-07-07 DIAGNOSIS — I10 ESSENTIAL HYPERTENSION: ICD-10-CM

## 2021-07-07 DIAGNOSIS — E66.01 OBESITY, MORBID (HCC): ICD-10-CM

## 2021-07-07 LAB — SL AMB POCT HEMOGLOBIN AIC: 6.1 (ref ?–6.5)

## 2021-07-07 PROCEDURE — 83036 HEMOGLOBIN GLYCOSYLATED A1C: CPT | Performed by: FAMILY MEDICINE

## 2021-07-07 PROCEDURE — 99213 OFFICE O/P EST LOW 20 MIN: CPT | Performed by: FAMILY MEDICINE

## 2021-07-07 RX ORDER — BLOOD-GLUCOSE METER
KIT MISCELLANEOUS
Qty: 200 EACH | Refills: 2 | Status: SHIPPED | OUTPATIENT
Start: 2021-07-07

## 2021-07-07 NOTE — ASSESSMENT & PLAN NOTE
CONTINUE DIET CONTROL, LOSE 5LBS BY NEXT VISIT  RECHECK AND IF STILL HTN DISCUSS MEDICATION INTERVENTION

## 2021-07-07 NOTE — ASSESSMENT & PLAN NOTE
RESUME DIET CONTROL WITH METORMIN AND AMARYL  RECHECK IN 3MONTHS      Lab Results   Component Value Date    HGBA1C 6 1 07/07/2021

## 2021-07-07 NOTE — PROGRESS NOTES
Assessment/Plan:    Type 2 diabetes mellitus without complication, without long-term current use of insulin ([address] )  RESUME DIET CONTROL WITH METORMIN AND AMARYL  RECHECK IN 3MONTHS  Lab Results   Component Value Date    HGBA1C 6 1 07/07/2021       Essential hypertension  CONTINUE DIET CONTROL, LOSE 5LBS BY NEXT VISIT  RECHECK AND IF STILL HTN DISCUSS MEDICATION INTERVENTION  Epithelial inclusion cyst  PROXIMAL END OF INCISION SITE OPEN  MARIANGEL REPORTS PICKING AT SCAB AND THEN IT DRAINS  EDUCATION REGARDING LEAVING SCAB ALONE TO ALLOW FOR HEALING  NO S/S INFECTION AT THIS TIME  Diagnoses and all orders for this visit:    Type 2 diabetes mellitus without complication, without long-term current use of insulin (HCC)  -     POCT hemoglobin A1c    Uncontrolled type 2 diabetes mellitus with hyperglycemia (HCC)    Essential hypertension    Epithelial inclusion cyst          Subjective:      Patient ID: Morales Harris is a 78 y o  male  INCLUSION CYST REMOVED 4/29/21 APPEARS TO STILL BE THERE  MARIANGEL STATES IT GETS CRUSTY THEN SCANT CLEAR DRAINAGE  TAKING HIS METFORMIN AND AMARYL FAITHFULLY  REMINDED THAT HE REALLY NEEDS TO FOLLOW UP WITH EYE DOCTOR, REPORTS HE NEEDS NEW LENS SO HE WILL SCHEDULE APPOINTMENT  REPORTS HE GOT THE MODERNA COVID VACCINE AT [facility] WHICH WAS [address] IN November  The following portions of the patient's history were reviewed and updated as appropriate: allergies, current medications, past family history, past medical history, past social history, past surgical history and problem list     Review of Systems   Constitutional: Negative  Negative for activity change, appetite change, chills, fatigue and fever  HENT: Negative  Negative for congestion, ear pain, postnasal drip and sinus pain  Eyes: Negative  Respiratory: Negative  Negative for cough and shortness of breath  Cardiovascular: Negative  Negative for chest pain and leg swelling  Gastrointestinal: Negative  Negative for constipation and diarrhea  Endocrine: Negative  Genitourinary: Negative  Negative for dysuria  Musculoskeletal: Negative  Skin: Negative  Allergic/Immunologic: Negative  Negative for immunocompromised state  Neurological: Negative  Negative for dizziness and light-headedness  Hematological: Negative  Psychiatric/Behavioral: Negative  Objective:      /90   Pulse 82   Temp 98 2 °F (36 8 °C)   Ht 5' 10" (1 778 m)   Wt 111 kg (245 lb)   SpO2 98%   BMI 35 15 kg/m²          Physical Exam  Vitals and nursing note reviewed  Constitutional:       Appearance: Normal appearance  He is obese  HENT:      Head: Normocephalic and atraumatic  Right Ear: Tympanic membrane, ear canal and external ear normal       Left Ear: Tympanic membrane, ear canal and external ear normal       Nose: Nose normal       Mouth/Throat:      Mouth: Mucous membranes are moist       Pharynx: Oropharynx is clear  Eyes:      Extraocular Movements: Extraocular movements intact  Conjunctiva/sclera: Conjunctivae normal       Pupils: Pupils are equal, round, and reactive to light  Cardiovascular:      Rate and Rhythm: Normal rate and regular rhythm  Pulses: Normal pulses  Heart sounds: Normal heart sounds  Pulmonary:      Effort: Pulmonary effort is normal       Breath sounds: Normal breath sounds  Abdominal:      General: Bowel sounds are normal       Palpations: Abdomen is soft  Musculoskeletal:         General: Normal range of motion  Cervical back: Normal range of motion and neck supple  Skin:     General: Skin is warm and dry  Findings: Lesion present  Comments: INCLUSION CYST SMALL OPEN AREA AT PROXIMAL END OF INCISION SITE  CLEAN, MARIANGEL TOOK CRUST OFF ON THE WAY TO DOCTOR OFFICE  NO S/S INFECTION NOTED   Neurological:      General: No focal deficit present        Mental Status: He is alert and oriented to person, place, and time  Psychiatric:         Mood and Affect: Mood normal          Behavior: Behavior normal          Thought Content:  Thought content normal          Judgment: Judgment normal

## 2021-07-07 NOTE — ASSESSMENT & PLAN NOTE
PROXIMAL END OF INCISION SITE OPEN  MARIANGEL REPORTS PICKING AT SCAB AND THEN IT DRAINS  EDUCATION REGARDING LEAVING SCAB ALONE TO ALLOW FOR HEALING  NO S/S INFECTION AT THIS TIME

## 2021-10-29 DIAGNOSIS — E11.9 TYPE 2 DIABETES MELLITUS WITHOUT COMPLICATION, WITHOUT LONG-TERM CURRENT USE OF INSULIN (HCC): ICD-10-CM

## 2021-11-01 RX ORDER — GLIMEPIRIDE 2 MG/1
TABLET ORAL
Qty: 90 TABLET | Refills: 1 | Status: SHIPPED | OUTPATIENT
Start: 2021-11-01 | End: 2022-05-07

## 2022-02-23 ENCOUNTER — OFFICE VISIT (OUTPATIENT)
Dept: FAMILY MEDICINE CLINIC | Facility: CLINIC | Age: 67
End: 2022-02-23
Payer: MEDICARE

## 2022-02-23 VITALS
SYSTOLIC BLOOD PRESSURE: 126 MMHG | DIASTOLIC BLOOD PRESSURE: 80 MMHG | TEMPERATURE: 97.4 F | BODY MASS INDEX: 34.79 KG/M2 | OXYGEN SATURATION: 93 % | WEIGHT: 243 LBS | HEIGHT: 70 IN | HEART RATE: 77 BPM

## 2022-02-23 DIAGNOSIS — E11.65 UNCONTROLLED TYPE 2 DIABETES MELLITUS WITH HYPERGLYCEMIA (HCC): ICD-10-CM

## 2022-02-23 DIAGNOSIS — I10 ESSENTIAL HYPERTENSION: ICD-10-CM

## 2022-02-23 DIAGNOSIS — D69.6 THROMBOCYTOPENIA (HCC): ICD-10-CM

## 2022-02-23 DIAGNOSIS — E11.9 TYPE 2 DIABETES MELLITUS WITHOUT COMPLICATION, WITHOUT LONG-TERM CURRENT USE OF INSULIN (HCC): Primary | ICD-10-CM

## 2022-02-23 DIAGNOSIS — E78.2 MIXED HYPERLIPIDEMIA: ICD-10-CM

## 2022-02-23 DIAGNOSIS — N52.9 ERECTILE DYSFUNCTION, UNSPECIFIED ERECTILE DYSFUNCTION TYPE: ICD-10-CM

## 2022-02-23 DIAGNOSIS — E66.01 OBESITY, MORBID (HCC): ICD-10-CM

## 2022-02-23 LAB — SL AMB POCT HEMOGLOBIN AIC: 6.3 (ref ?–6.5)

## 2022-02-23 PROCEDURE — 99214 OFFICE O/P EST MOD 30 MIN: CPT | Performed by: PHYSICIAN ASSISTANT

## 2022-02-23 PROCEDURE — 83036 HEMOGLOBIN GLYCOSYLATED A1C: CPT | Performed by: PHYSICIAN ASSISTANT

## 2022-02-23 RX ORDER — METFORMIN HYDROCHLORIDE 500 MG/1
500 TABLET, EXTENDED RELEASE ORAL
Qty: 30 TABLET | Refills: 3 | Status: SHIPPED | OUTPATIENT
Start: 2022-02-23 | End: 2022-06-09

## 2022-02-23 RX ORDER — SILDENAFIL 50 MG/1
50 TABLET, FILM COATED ORAL DAILY PRN
Qty: 10 TABLET | Refills: 3 | Status: SHIPPED | OUTPATIENT
Start: 2022-02-23 | End: 2022-07-24

## 2022-02-23 NOTE — PROGRESS NOTES
Assessment/Plan:       Diagnoses and all orders for this visit:    Type 2 diabetes mellitus without complication, without long-term current use of insulin (HCC)  -     POCT hemoglobin A1c  -     CBC and differential; Future  -     Comprehensive metabolic panel; Future  -     Lipid panel; Future  -     TSH, 3rd generation with Free T4 reflex; Future  -     metFORMIN (GLUCOPHAGE-XR) 500 mg 24 hr tablet; Take 1 tablet (500 mg total) by mouth daily with dinner    Essential hypertension  -     CBC and differential; Future  -     Comprehensive metabolic panel; Future  -     Lipid panel; Future  -     TSH, 3rd generation with Free T4 reflex; Future    Obesity, morbid (HCC)  -     CBC and differential; Future  -     Comprehensive metabolic panel; Future  -     Lipid panel; Future  -     TSH, 3rd generation with Free T4 reflex; Future    Mixed hyperlipidemia  -     CBC and differential; Future  -     Comprehensive metabolic panel; Future  -     Lipid panel; Future  -     TSH, 3rd generation with Free T4 reflex; Future    Thrombocytopenia (HCC)  -     CBC and differential; Future  -     Comprehensive metabolic panel; Future  -     Lipid panel; Future  -     TSH, 3rd generation with Free T4 reflex; Future    Uncontrolled type 2 diabetes mellitus with hyperglycemia (HCC)    Erectile dysfunction, unspecified erectile dysfunction type  -     sildenafil (VIAGRA) 50 MG tablet; Take 1 tablet (50 mg total) by mouth daily as needed for erectile dysfunction          Subjective:      Patient ID: Cadence Guzmán is a 78 y o  male  Presents for follow up  Requesting A1C done in office  Monitors glucose 100-120, no acute concerns  Review of Systems   Constitutional: Negative for chills, diaphoresis, fatigue and fever  Respiratory: Negative for cough, chest tightness and shortness of breath  Gastrointestinal: Positive for diarrhea  Negative for abdominal pain, constipation, nausea and vomiting          Has occasional diarrhea  Musculoskeletal: Positive for myalgias  Negative for arthralgias, back pain, neck pain and neck stiffness  C/o calf pains, bilaterally  Objective:      /80   Pulse 77   Temp (!) 97 4 °F (36 3 °C) (Tympanic)   Ht 5' 10" (1 778 m)   Wt 110 kg (243 lb)   SpO2 93%   BMI 34 87 kg/m²          Physical Exam  Constitutional:       General: He is not in acute distress  Appearance: Normal appearance  He is not ill-appearing, toxic-appearing or diaphoretic  HENT:      Head: Normocephalic and atraumatic  Cardiovascular:      Rate and Rhythm: Normal rate and regular rhythm  Pulses: Normal pulses  no weak pulses          Dorsalis pedis pulses are 2+ on the right side and 2+ on the left side  Posterior tibial pulses are 2+ on the right side and 2+ on the left side  Heart sounds: Normal heart sounds  Pulmonary:      Effort: Pulmonary effort is normal  No respiratory distress  Breath sounds: Normal breath sounds  No stridor  No wheezing or rhonchi  Musculoskeletal:         General: No swelling, tenderness, deformity or signs of injury  Normal range of motion  Right lower leg: No edema  Left lower leg: No edema  Feet:      Right foot:      Skin integrity: No ulcer, skin breakdown, erythema, warmth, callus or dry skin  Left foot:      Skin integrity: No ulcer, skin breakdown, erythema, warmth, callus or dry skin  Neurological:      General: No focal deficit present  Mental Status: He is alert and oriented to person, place, and time  Cranial Nerves: No cranial nerve deficit  Sensory: No sensory deficit  Motor: No weakness  Coordination: Coordination normal    Psychiatric:         Mood and Affect: Mood normal          Behavior: Behavior normal          Thought Content: Thought content normal          Judgment: Judgment normal        Patient's shoes and socks removed      Right Foot/Ankle   Right Foot Inspection  Skin Exam: skin normal and skin intact  No dry skin, no warmth, no callus, no erythema, no maceration, no abnormal color, no pre-ulcer, no ulcer and no callus  Toe Exam: ROM and strength within normal limits  Sensory   Monofilament testing: intact    Vascular  The right DP pulse is 2+  The right PT pulse is 2+  Left Foot/Ankle  Left Foot Inspection  Skin Exam: skin normal and skin intact  No dry skin, no warmth, no erythema, no maceration, normal color, no pre-ulcer, no ulcer and no callus  Toe Exam: ROM and strength within normal limits  Sensory   Monofilament testing: diminished    Vascular  The left DP pulse is 2+  The left PT pulse is 2+       Assign Risk Category  No deformity present  Loss of protective sensation  No weak pulses  Risk: 1

## 2022-02-28 ENCOUNTER — TELEPHONE (OUTPATIENT)
Dept: FAMILY MEDICINE CLINIC | Facility: HOSPITAL | Age: 67
End: 2022-02-28

## 2022-02-28 NOTE — TELEPHONE ENCOUNTER
Nancy Comment from [facility] called with questions regarding Surinder's medications    [facility] [phone]  Press option 3  Use reference number: [id number]

## 2022-03-02 ENCOUNTER — RA CDI HCC (OUTPATIENT)
Dept: OTHER | Facility: HOSPITAL | Age: 67
End: 2022-03-02

## 2022-06-09 DIAGNOSIS — E11.9 TYPE 2 DIABETES MELLITUS WITHOUT COMPLICATION, WITHOUT LONG-TERM CURRENT USE OF INSULIN (HCC): ICD-10-CM

## 2022-06-09 RX ORDER — METFORMIN HYDROCHLORIDE 500 MG/1
TABLET, EXTENDED RELEASE ORAL
Qty: 90 TABLET | Refills: 1 | Status: SHIPPED | OUTPATIENT
Start: 2022-06-09

## 2022-07-18 ENCOUNTER — OFFICE VISIT (OUTPATIENT)
Dept: FAMILY MEDICINE CLINIC | Facility: CLINIC | Age: 67
End: 2022-07-18
Payer: MEDICARE

## 2022-07-18 VITALS
SYSTOLIC BLOOD PRESSURE: 135 MMHG | HEIGHT: 70 IN | BODY MASS INDEX: 34.93 KG/M2 | WEIGHT: 244 LBS | DIASTOLIC BLOOD PRESSURE: 95 MMHG | OXYGEN SATURATION: 97 % | TEMPERATURE: 95.9 F | HEART RATE: 83 BPM

## 2022-07-18 DIAGNOSIS — I10 ESSENTIAL HYPERTENSION: ICD-10-CM

## 2022-07-18 DIAGNOSIS — E66.01 CLASS 2 SEVERE OBESITY WITH BODY MASS INDEX (BMI) OF 35 TO 39.9 WITH SERIOUS COMORBIDITY (HCC): ICD-10-CM

## 2022-07-18 DIAGNOSIS — Z00.00 MEDICARE ANNUAL WELLNESS VISIT, SUBSEQUENT: Primary | ICD-10-CM

## 2022-07-18 DIAGNOSIS — E11.65 UNCONTROLLED TYPE 2 DIABETES MELLITUS WITH HYPERGLYCEMIA (HCC): ICD-10-CM

## 2022-07-18 LAB — SL AMB POCT HEMOGLOBIN AIC: 7.1 (ref ?–6.5)

## 2022-07-18 PROCEDURE — G0439 PPPS, SUBSEQ VISIT: HCPCS | Performed by: FAMILY MEDICINE

## 2022-07-18 PROCEDURE — 83036 HEMOGLOBIN GLYCOSYLATED A1C: CPT | Performed by: FAMILY MEDICINE

## 2022-07-18 PROCEDURE — 99213 OFFICE O/P EST LOW 20 MIN: CPT | Performed by: FAMILY MEDICINE

## 2022-07-18 NOTE — PROGRESS NOTES
Assessment and Plan:     Problem List Items Addressed This Visit        Endocrine    Uncontrolled type 2 diabetes mellitus with hyperglycemia ([facility] )       Lab Results   Component Value Date    HGBA1C 7 1 (A) 07/18/2022   recheck 3 months  Discussed dietary modification         Relevant Orders    POCT hemoglobin A1c (Completed)       Cardiovascular and Mediastinum    Essential hypertension     States bp is high this am, under some stress this am  Closing on house in a few hours  Other    Class 2 severe obesity with body mass index (BMI) of 35 to 39 9 with serious comorbidity ([facility] )    Medicare annual wellness visit, subsequent - Primary        BMI Counseling: Body mass index is 35 01 kg/m²  The BMI is above normal  Nutrition recommendations include decreasing portion sizes  Exercise recommendations include exercising 3-5 times per week  No pharmacotherapy was ordered  Rationale for BMI follow-up plan is due to patient being overweight or obese  Depression Screening and Follow-up Plan: Patient was screened for depression during today's encounter  They screened negative with a PHQ-2 score of 0  Preventive health issues were discussed with patient, and age appropriate screening tests were ordered as noted in patient's After Visit Summary  Personalized health advice and appropriate referrals for health education or preventive services given if needed, as noted in patient's After Visit Summary  History of Present Illness:     Patient presents for a Medicare Wellness Visit    Pt is seen for medicare wellness and follow up on diabetes  He states he has been very active  He denies chest pain or shortness of breath  His bms are normal       Patient Care Team:  [place], DO as PCP - General     Review of Systems:     Review of Systems   Constitutional: Negative  Negative for fatigue and fever  HENT: Negative  Eyes: Negative  Respiratory: Negative  Negative for cough      Cardiovascular: Negative  Gastrointestinal: Negative  Endocrine: Negative  Genitourinary: Negative  Musculoskeletal: Negative  Skin: Negative  Allergic/Immunologic: Negative  Neurological: Negative  Psychiatric/Behavioral: Negative  Problem List:     Patient Active Problem List   Diagnosis    Essential hypertension    Thrombocytopenia (HCC)    Vitamin D deficiency    Chronic tension-type headache, not intractable    Type 2 diabetes mellitus without complication, without long-term current use of insulin ([facility] )    Controlled type 2 diabetes mellitus without complication, without long-term current use of insulin ([facility])    Mixed hyperlipidemia    Epithelial inclusion cyst    Class 2 severe obesity with body mass index (BMI) of 35 to 39 9 with serious comorbidity ([facility] )    Uncontrolled type 2 diabetes mellitus with hyperglycemia ([facility] )    Medicare annual wellness visit, subsequent      Past Medical and Surgical History:     Past Medical History:   Diagnosis Date    Cyst of skin 04/29/2021    Behind right ear    Diabetes mellitus ([facility] )     Epithelial inclusion cyst 7/7/2021     Past Surgical History:   Procedure Laterality Date    CYST REMOVAL Right 04/29/2021    Excision of skin cyst from behind right ear  Office procedure      Family History:     Family History   Problem Relation Age of Onset    Hypertension Father     Colon cancer Neg Hx     Colon polyps Neg Hx       Social History:     Social History     Socioeconomic History    Marital status: Single     Spouse name: None    Number of children: None    Years of education: None    Highest education level: None   Occupational History    None   Tobacco Use    Smoking status: Never Smoker    Smokeless tobacco: Never Used   Vaping Use    Vaping Use: Never used   Substance and Sexual Activity    Alcohol use:  Yes     Alcohol/week: 1 0 standard drink     Types: 1 Standard drinks or equivalent per week    Drug use: No    Sexual activity: None   Other Topics Concern    None   Social History Narrative    None     Social Determinants of Health     Financial Resource Strain: Not on file   Food Insecurity: Not on file   Transportation Needs: Not on file   Physical Activity: Not on file   Stress: Not on file   Social Connections: Not on file   Intimate Partner Violence: Not on file   Housing Stability: Not on file      Medications and Allergies:     Current Outpatient Medications   Medication Sig Dispense Refill    Blood Glucose Monitoring Suppl (FreeStyle Lite) TAMMY by Percutaneous route 2 (two) times a day 1 each 0    glimepiride (AMARYL) 2 mg tablet TAKE 1 TABLET BY MOUTH EVERY DAY WITH BREAKFAST 90 tablet 0    glucose blood (FREESTYLE LITE) test strip For bid testing 200 each 3    glucose blood (FREESTYLE LITE) test strip TEST AS DIRECTED 200 each 2    glucose monitoring kit (FREESTYLE) monitoring kit 1 each by Does not apply route 2 (two) times a day before lunch and dinner E11 65 1 each 0    Lancets (freestyle) lancets Use as instructed E11 65 testing twice a day 100 each 0    metFORMIN (GLUCOPHAGE-XR) 500 mg 24 hr tablet TAKE 1 TABLET BY MOUTH EVERY DAY WITH DINNER 90 tablet 1    sildenafil (VIAGRA) 50 MG tablet Take 1 tablet (50 mg total) by mouth daily as needed for erectile dysfunction 10 tablet 3     No current facility-administered medications for this visit  No Known Allergies   Immunizations:     Immunization History   Administered Date(s) Administered    Tdap 08/17/2015      Health Maintenance:         Topic Date Due    Colorectal Cancer Screening  04/27/2026    Hepatitis C Screening  Completed         Topic Date Due    COVID-19 Vaccine (1) Never done    Pneumococcal Vaccine: 65+ Years (1 - PCV) Never done    Influenza Vaccine (1) 09/01/2022      Medicare Screening Tests and Risk Assessments:     Riaz Woody is here for his Subsequent Wellness visit   Last Medicare Wellness visit information reviewed, patient interviewed and updates made to the record today  Health Risk Assessment:   Patient rates overall health as excellent  Patient feels that their physical health rating is slightly better  Patient is very satisfied with their life  Eyesight was rated as same  Hearing was rated as same  Patient feels that their emotional and mental health rating is same  Patients states they are never, rarely angry  Patient states they are never, rarely unusually tired/fatigued  Pain experienced in the last 7 days has been none  Patient states that he has experienced no weight loss or gain in last 6 months  Depression Screening:   PHQ-2 Score: 0      Fall Risk Screening: In the past year, patient has experienced: no history of falling in past year      Home Safety:  Patient does not have trouble with stairs inside or outside of their home  Patient has working smoke alarms and has working carbon monoxide detector  Home safety hazards include: none  Nutrition:   Current diet is Regular  Medications:   Patient is not currently taking any over-the-counter supplements  Patient is able to manage medications  Activities of Daily Living (ADLs)/Instrumental Activities of Daily Living (IADLs):   Walk and transfer into and out of bed and chair?: Yes  Dress and groom yourself?: Yes    Bathe or shower yourself?: Yes    Feed yourself? Yes  Do your laundry/housekeeping?: Yes  Manage your money, pay your bills and track your expenses?: Yes  Make your own meals?: Yes    Do your own shopping?: Yes    Previous Hospitalizations:   Any hospitalizations or ED visits within the last 12 months?: No      Advance Care Planning:   Living will: Yes    Durable POA for healthcare:  Yes    Advanced directive: Yes      Cognitive Screening:   Provider or family/friend/caregiver concerned regarding cognition?: No    PREVENTIVE SCREENINGS      Cardiovascular Screening:    General: Screening Not Indicated and History Lipid Disorder      Diabetes Screening:     General: Screening Not Indicated and History Diabetes      Colorectal Cancer Screening:     General: Screening Current      Abdominal Aortic Aneurysm (AAA) Screening:    Risk factors include: age between 73-69 yo        Lung Cancer Screening:     General: Screening Not Indicated      Hepatitis C Screening:    General: Screening Current    Screening, Brief Intervention, and Referral to Treatment (SBIRT)    Screening  Typical number of drinks in a day: 0  Typical number of drinks in a week: 0  Interpretation: Low risk drinking behavior  Single Item Drug Screening:  How often have you used an illegal drug (including marijuana) or a prescription medication for non-medical reasons in the past year? never    Single Item Drug Screen Score: 0  Interpretation: Negative screen for possible drug use disorder    No exam data present     Physical Exam:     /95   Pulse 83   Temp (!) 95 9 °F (35 5 °C)   Ht 5' 10" (1 778 m)   Wt 111 kg (244 lb)   SpO2 97%   BMI 35 01 kg/m²     Physical Exam  Vitals and nursing note reviewed  Constitutional:       Appearance: He is well-developed  He is obese  HENT:      Head: Normocephalic  Right Ear: External ear normal       Left Ear: External ear normal       Nose: Nose normal    Eyes:      Conjunctiva/sclera: Conjunctivae normal       Pupils: Pupils are equal, round, and reactive to light  Cardiovascular:      Rate and Rhythm: Normal rate and regular rhythm  Pulmonary:      Effort: Pulmonary effort is normal       Breath sounds: Normal breath sounds  Abdominal:      General: Bowel sounds are normal       Palpations: Abdomen is soft  Musculoskeletal:         General: Normal range of motion  Cervical back: Normal range of motion and neck supple  Skin:     General: Skin is warm and dry  Neurological:      Mental Status: He is alert and oriented to person, place, and time     Psychiatric:         Behavior: Behavior normal          Thought Content:  Thought content normal          Judgment: Judgment normal           Wandra Danielle, DO

## 2022-07-18 NOTE — PATIENT INSTRUCTIONS

## 2022-07-19 PROBLEM — Z00.00 MEDICARE ANNUAL WELLNESS VISIT, SUBSEQUENT: Status: ACTIVE | Noted: 2022-07-19

## 2022-07-19 PROBLEM — E66.812 CLASS 2 SEVERE OBESITY WITH BODY MASS INDEX (BMI) OF 35 TO 39.9 WITH SERIOUS COMORBIDITY (HCC): Status: ACTIVE | Noted: 2021-07-07

## 2022-07-19 NOTE — ASSESSMENT & PLAN NOTE
Lab Results   Component Value Date    HGBA1C 7 1 (A) 07/18/2022   recheck 3 months   Discussed dietary modification

## 2022-07-24 DIAGNOSIS — N52.9 ERECTILE DYSFUNCTION, UNSPECIFIED ERECTILE DYSFUNCTION TYPE: ICD-10-CM

## 2022-07-24 RX ORDER — SILDENAFIL 50 MG/1
TABLET, FILM COATED ORAL
Qty: 10 TABLET | Refills: 0 | Status: SHIPPED | OUTPATIENT
Start: 2022-07-24 | End: 2022-07-25 | Stop reason: SDUPTHER

## 2022-07-25 DIAGNOSIS — N52.9 ERECTILE DYSFUNCTION, UNSPECIFIED ERECTILE DYSFUNCTION TYPE: ICD-10-CM

## 2022-07-25 RX ORDER — SILDENAFIL 50 MG/1
50 TABLET, FILM COATED ORAL AS NEEDED
Qty: 10 TABLET | Refills: 0 | Status: SHIPPED | OUTPATIENT
Start: 2022-07-25 | End: 2022-07-26 | Stop reason: SDUPTHER

## 2022-07-26 DIAGNOSIS — N52.9 ERECTILE DYSFUNCTION, UNSPECIFIED ERECTILE DYSFUNCTION TYPE: ICD-10-CM

## 2022-07-26 RX ORDER — SILDENAFIL 50 MG/1
50 TABLET, FILM COATED ORAL AS NEEDED
Qty: 10 TABLET | Refills: 0 | Status: SHIPPED | OUTPATIENT
Start: 2022-07-26 | End: 2022-08-17 | Stop reason: SDUPTHER

## 2022-07-26 NOTE — TELEPHONE ENCOUNTER
He wants you to know that he takes 2 at one time it works much better  And he wants more than that called in due to the fact he pays out of pocket      He wants to know your thoughts

## 2022-08-17 DIAGNOSIS — N52.9 ERECTILE DYSFUNCTION, UNSPECIFIED ERECTILE DYSFUNCTION TYPE: ICD-10-CM

## 2022-08-17 RX ORDER — SILDENAFIL 50 MG/1
100 TABLET, FILM COATED ORAL AS NEEDED
Qty: 30 TABLET | Refills: 5 | Status: SHIPPED | OUTPATIENT
Start: 2022-08-17

## 2022-08-17 NOTE — TELEPHONE ENCOUNTER
He was wondering why he can not have 3 months worth of sildenafil 50 mg tablets  He is paying out of pocket and is requesting this

## 2022-10-11 PROBLEM — Z00.00 MEDICARE ANNUAL WELLNESS VISIT, SUBSEQUENT: Status: RESOLVED | Noted: 2022-07-19 | Resolved: 2022-10-11

## 2022-12-05 ENCOUNTER — TELEPHONE (OUTPATIENT)
Dept: FAMILY MEDICINE CLINIC | Facility: CLINIC | Age: 67
End: 2022-12-05

## 2022-12-05 DIAGNOSIS — R05.1 ACUTE COUGH: Primary | ICD-10-CM

## 2022-12-05 RX ORDER — HYDROCODONE POLISTIREX AND CHLORPHENIRAMINE POLISTIREX 10; 8 MG/5ML; MG/5ML
5 SUSPENSION, EXTENDED RELEASE ORAL EVERY 12 HOURS PRN
Qty: 120 ML | Refills: 0 | Status: SHIPPED | OUTPATIENT
Start: 2022-12-05

## 2022-12-05 NOTE — TELEPHONE ENCOUNTER
Since last Monday he has had a cough that got worse on Wednesday and has remained  Pt states that his eyes run, he is sweating a lot and was covid negative per a home test taken Wednesday and Saturday  Not sleeping due to cough, there is a clear thin phloem with deep hard cough  Was requesting cough syrup with codeine or something similar      [facility] [phone]

## 2022-12-08 ENCOUNTER — OFFICE VISIT (OUTPATIENT)
Dept: FAMILY MEDICINE CLINIC | Facility: CLINIC | Age: 67
End: 2022-12-08

## 2022-12-08 VITALS
DIASTOLIC BLOOD PRESSURE: 70 MMHG | OXYGEN SATURATION: 94 % | HEIGHT: 70 IN | BODY MASS INDEX: 35.07 KG/M2 | WEIGHT: 245 LBS | SYSTOLIC BLOOD PRESSURE: 128 MMHG | TEMPERATURE: 96.5 F | HEART RATE: 78 BPM

## 2022-12-08 DIAGNOSIS — J20.9 ACUTE BRONCHITIS, UNSPECIFIED ORGANISM: Primary | ICD-10-CM

## 2022-12-08 RX ORDER — CEFUROXIME AXETIL 250 MG/1
250 TABLET ORAL EVERY 12 HOURS SCHEDULED
Qty: 20 TABLET | Refills: 0 | Status: SHIPPED | OUTPATIENT
Start: 2022-12-08 | End: 2022-12-18

## 2022-12-08 NOTE — PROGRESS NOTES
Name: Karin Ramirez      : 1955      MRN: [id number]  Encounter Provider: YUN Thrasher  Encounter Date: 2022   Encounter department: [facility]    Assessment & Plan     1  Acute bronchitis, unspecified organism  Assessment & Plan:  Improving today seems to have made the turn  If symptoms return start ceftin twice daily and complete 10 day course  Nasal saline spray  Sleep with head of bed elevated  Continue tussionex    Orders:  -     cefuroxime (CEFTIN) 250 mg tablet; Take 1 tablet (250 mg total) by mouth every 12 (twelve) hours for 10 days         Subjective      Started about 12 days ago with head congestion then settled into his chest  Yesterday was still having coughing fits  Productive cough  Has been taking nyquil and cough drops  Started Luda George a few days ago  Last night significant improvement  Feels glands are swollen, sore throat, right ear pain- seem better today  Today is the best day of the last 10  BS slightly up last week due to cough drops having sugar  Review of Systems   Constitutional: Positive for chills and fatigue  Negative for fever  HENT: Positive for congestion, ear pain, postnasal drip, rhinorrhea and sore throat  Respiratory: Positive for cough and wheezing  Negative for shortness of breath  Cardiovascular: Negative  Gastrointestinal: Negative  Genitourinary: Negative  Skin: Negative for rash  Neurological: Positive for headaches  Hematological: Positive for adenopathy         Current Outpatient Medications on File Prior to Visit   Medication Sig   • Blood Glucose Monitoring Suppl (FreeStyle Lite) TAMMY by Percutaneous route 2 (two) times a day   • glimepiride (AMARYL) 2 mg tablet TAKE 1 TABLET BY MOUTH EVERY DAY WITH BREAKFAST   • glucose blood (FREESTYLE LITE) test strip TEST AS DIRECTED   • glucose monitoring kit (FREESTYLE) monitoring kit 1 each by Does not apply route 2 (two) times a day before lunch and dinner E11 65   • hydrocodone-chlorpheniramine polistirex (TUSSIONEX) 10-8 mg/5 mL ER suspension Take 5 mL by mouth every 12 (twelve) hours as needed for cough Max Daily Amount: 10 mL   • Lancets (freestyle) lancets Use as instructed E11 65 testing twice a day   • metFORMIN (GLUCOPHAGE-XR) 500 mg 24 hr tablet TAKE 1 TABLET BY MOUTH EVERY DAY WITH DINNER   • sildenafil (VIAGRA) 50 MG tablet Take 2 tablets (100 mg total) by mouth as needed for erectile dysfunction   • [DISCONTINUED] glucose blood (FREESTYLE LITE) test strip For bid testing (Patient not taking: Reported on 12/8/2022)       Objective     /70   Pulse 78   Temp (!) 96 5 °F (35 8 °C) (Tympanic)   Ht 5' 10" (1 778 m)   Wt 111 kg (245 lb)   SpO2 94%   BMI 35 15 kg/m²     Physical Exam  Vitals and nursing note reviewed  Constitutional:       General: He is not in acute distress  Appearance: Normal appearance  He is not ill-appearing  HENT:      Right Ear: Tympanic membrane, ear canal and external ear normal       Left Ear: Tympanic membrane, ear canal and external ear normal       Nose: Congestion and rhinorrhea present  Mouth/Throat:      Mouth: Mucous membranes are moist       Pharynx: Posterior oropharyngeal erythema present  Eyes:      General: No scleral icterus  Right eye: No discharge  Left eye: No discharge  Extraocular Movements: Extraocular movements intact  Conjunctiva/sclera: Conjunctivae normal       Pupils: Pupils are equal, round, and reactive to light  Cardiovascular:      Rate and Rhythm: Normal rate and regular rhythm  Heart sounds: Normal heart sounds  No murmur heard  Pulmonary:      Effort: Pulmonary effort is normal       Breath sounds: Rhonchi (scattered) present  No wheezing  Abdominal:      Palpations: Abdomen is soft  Tenderness: There is no abdominal tenderness  Musculoskeletal:      Cervical back: Neck supple  Right lower leg: No edema        Left lower leg: No edema  Lymphadenopathy:      Cervical: No cervical adenopathy  Skin:     General: Skin is warm  Findings: No rash  Neurological:      Mental Status: He is alert and oriented to person, place, and time     Psychiatric:         Mood and Affect: Mood normal          Behavior: Behavior normal        Tavares Noriega

## 2022-12-09 NOTE — ASSESSMENT & PLAN NOTE
Improving today seems to have made the turn  If symptoms return start ceftin twice daily and complete 10 day course  Nasal saline spray  Sleep with head of bed elevated  Continue tussionex

## 2023-01-14 DIAGNOSIS — E11.9 TYPE 2 DIABETES MELLITUS WITHOUT COMPLICATION, WITHOUT LONG-TERM CURRENT USE OF INSULIN (HCC): ICD-10-CM

## 2023-01-14 RX ORDER — METFORMIN HYDROCHLORIDE 500 MG/1
TABLET, EXTENDED RELEASE ORAL
Qty: 90 TABLET | Refills: 1 | Status: SHIPPED | OUTPATIENT
Start: 2023-01-14

## 2023-02-08 ENCOUNTER — VBI (OUTPATIENT)
Dept: ADMINISTRATIVE | Facility: OTHER | Age: 68
End: 2023-02-08

## 2023-03-10 ENCOUNTER — APPOINTMENT (OUTPATIENT)
Dept: RADIOLOGY | Facility: CLINIC | Age: 68
End: 2023-03-10

## 2023-03-10 ENCOUNTER — OFFICE VISIT (OUTPATIENT)
Dept: PODIATRY | Facility: CLINIC | Age: 68
End: 2023-03-10

## 2023-03-10 VITALS
HEART RATE: 80 BPM | SYSTOLIC BLOOD PRESSURE: 182 MMHG | DIASTOLIC BLOOD PRESSURE: 122 MMHG | WEIGHT: 245 LBS | HEIGHT: 70 IN | BODY MASS INDEX: 35.07 KG/M2

## 2023-03-10 DIAGNOSIS — M72.2 PLANTAR FASCIITIS: ICD-10-CM

## 2023-03-10 DIAGNOSIS — M79.672 PAIN IN LEFT FOOT: ICD-10-CM

## 2023-03-10 DIAGNOSIS — E11.40 TYPE 2 DIABETES MELLITUS WITH DIABETIC NEUROPATHY, WITHOUT LONG-TERM CURRENT USE OF INSULIN (HCC): ICD-10-CM

## 2023-03-10 DIAGNOSIS — M79.672 PAIN IN LEFT FOOT: Primary | ICD-10-CM

## 2023-03-10 RX ORDER — MELOXICAM 15 MG/1
15 TABLET ORAL DAILY
Qty: 30 TABLET | Refills: 0 | Status: SHIPPED | OUTPATIENT
Start: 2023-03-10 | End: 2023-04-09

## 2023-03-10 NOTE — PROGRESS NOTES
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   -     meloxicam (Mobic) 15 mg tablet; Take 1 tablet (15 mg total) by mouth daily  - Patient instructed on daily stretching exercises and icing   - OTC orthotics and shoe gear modifications discussed  Recommend good running shoe  Such as [employer], [employer], or [employer]  DM2 with diabetic neuropathy, without long-term current use of insulin ([facility] )  -     Encouraged good blood sugar management to prevent complications in the future ,  Recommend diabetic foot care twice a year  Subjective:      Patient ID: Tawny Lai is a 75 y o  male  3/10/2023: 75year-old type II diabetic presents concerned with left heel pain for 6 weeks duration  Hurts especially after resting and getting up on it  Denies any trauma or injury      The following portions of the patient's history were reviewed and updated as appropriate: allergies, current medications, past family history, past medical history, past social history, past surgical history and problem list   Past Medical History:   Diagnosis Date   • Cyst of skin 04/29/2021    Behind right ear   • Diabetes mellitus ([facility] )    • Epithelial inclusion cyst 7/7/2021     Current Outpatient Medications on File Prior to Visit   Medication Sig Dispense Refill   • Blood Glucose Monitoring Suppl (FreeStyle Lite) TAMMY by Percutaneous route 2 (two) times a day 1 each 0   • glucose blood (FREESTYLE LITE) test strip TEST AS DIRECTED 200 each 2   • glucose monitoring kit (FREESTYLE) monitoring kit 1 each by Does not apply route 2 (two) times a day before lunch and dinner E11 65 1 each 0   • hydrocodone-chlorpheniramine polistirex (TUSSIONEX) 10-8 mg/5 mL ER suspension Take 5 mL by mouth every 12 (twelve) hours as needed for cough Max Daily Amount: 10 mL 120 mL 0   • Lancets (freestyle) lancets Use as instructed E11 65 testing twice a day 100 each 0   • metFORMIN (GLUCOPHAGE-XR) 500 mg 24 hr tablet TAKE 1 TABLET BY MOUTH EVERY DAY WITH DINNER 90 tablet 1   • sildenafil (VIAGRA) 50 MG tablet Take 2 tablets (100 mg total) by mouth as needed for erectile dysfunction 30 tablet 5     No current facility-administered medications on file prior to visit  Past Surgical History:   Procedure Laterality Date   • CYST REMOVAL Right 04/29/2021    Excision of skin cyst from behind right ear  Office procedure     No Known Allergies      Review of Systems   Constitutional: Negative for chills and fever  HENT: Negative for ear pain and sore throat  Eyes: Negative for pain and visual disturbance  Respiratory: Negative for cough and shortness of breath  Cardiovascular: Negative for chest pain and palpitations  Gastrointestinal: Negative for abdominal pain and vomiting  Endocrine:        Type II diabetic   Genitourinary: Negative for dysuria and hematuria  Musculoskeletal: Negative for arthralgias and back pain  Painful left heel 6 weeks duration   Skin: Negative for color change and rash  Neurological: Negative for seizures and syncope  All other systems reviewed and are negative  Objective:      BP (!) 182/122   Pulse 80   Ht 5' 10" (1 778 m)   Wt 111 kg (245 lb)   BMI 35 15 kg/m²      Physical Exam  Constitutional:       Appearance: Normal appearance  HENT:      Head: Normocephalic  Right Ear: Tympanic membrane normal       Left Ear: Tympanic membrane normal       Nose: No congestion  Mouth/Throat:      Pharynx: No oropharyngeal exudate or posterior oropharyngeal erythema  Eyes:      Extraocular Movements: Extraocular movements intact  Conjunctiva/sclera: Conjunctivae normal       Pupils: Pupils are equal, round, and reactive to light     Cardiovascular:      Rate and Rhythm: Normal rate and regular rhythm  Pulses:           Dorsalis pedis pulses are 1+ on the right side and 1+ on the left side  Posterior tibial pulses are 1+ on the right side and 1+ on the left side  Pulmonary:      Effort: Pulmonary effort is normal    Musculoskeletal:         General: Tenderness present  Normal range of motion  Comments:   Moderate pain with palpation plantar medial aspect of left heel at insertion of plantar fascia  Localized induration noted  No pain with subtalar joint/ankle joint range of motion  Reports most pain is present first thing out of bed in the a m  and after resting for period of time and getting up  Texture tone and turgor are within normal limits  Digital hair noted  Feet:      Right foot:      Protective Sensation: 10 sites tested  10 sites sensed  Skin integrity: Dry skin present  Left foot:      Protective Sensation: 10 sites tested  10 sites sensed  Skin integrity: Dry skin present  Skin:     General: Skin is warm and dry  Capillary Refill: Capillary refill takes 2 to 3 seconds  Coloration: Skin is not pale  Findings: No bruising, erythema, lesion or rash  Neurological:      General: No focal deficit present  Mental Status: He is alert  Cranial Nerves: No cranial nerve deficit  Sensory: No sensory deficit (Diminished vibratory sensation bilaterally, no paresthesias reported  )  Motor: No weakness        Gait: Gait normal       Deep Tendon Reflexes: Reflexes normal    Psychiatric:         Mood and Affect: Mood normal          Behavior: Behavior normal          Judgment: Judgment normal

## 2023-03-13 ENCOUNTER — TELEPHONE (OUTPATIENT)
Dept: PODIATRY | Facility: CLINIC | Age: 68
End: 2023-03-13

## 2023-03-13 NOTE — TELEPHONE ENCOUNTER
Caller: Patient    Doctor: Nikunj trejo    Reason for call: Request for medication needs to be sent to [facility] in [place] [address]      Call back#: [phone]

## 2023-05-05 DIAGNOSIS — E11.9 TYPE 2 DIABETES MELLITUS WITHOUT COMPLICATION, WITHOUT LONG-TERM CURRENT USE OF INSULIN (HCC): ICD-10-CM

## 2023-05-07 RX ORDER — GLIMEPIRIDE 2 MG/1
TABLET ORAL
Qty: 90 TABLET | Refills: 1 | Status: SHIPPED | OUTPATIENT
Start: 2023-05-07

## 2023-06-07 ENCOUNTER — TELEPHONE (OUTPATIENT)
Dept: FAMILY MEDICINE CLINIC | Facility: CLINIC | Age: 68
End: 2023-06-07

## 2023-08-18 DIAGNOSIS — E11.9 TYPE 2 DIABETES MELLITUS WITHOUT COMPLICATION, WITHOUT LONG-TERM CURRENT USE OF INSULIN (HCC): ICD-10-CM

## 2023-08-18 RX ORDER — METFORMIN HYDROCHLORIDE 500 MG/1
TABLET, EXTENDED RELEASE ORAL
Qty: 90 TABLET | Refills: 1 | Status: SHIPPED | OUTPATIENT
Start: 2023-08-18

## 2023-09-25 ENCOUNTER — HOSPITAL ENCOUNTER (INPATIENT)
Facility: HOSPITAL | Age: 68
LOS: 3 days | Discharge: LEFT BEFORE DISCHARGE | End: 2023-09-28
Attending: EMERGENCY MEDICINE | Admitting: FAMILY MEDICINE
Payer: MEDICARE

## 2023-09-25 ENCOUNTER — APPOINTMENT (EMERGENCY)
Dept: RADIOLOGY | Facility: HOSPITAL | Age: 68
End: 2023-09-25
Payer: MEDICARE

## 2023-09-25 DIAGNOSIS — K61.1 PERIRECTAL ABSCESS: Primary | ICD-10-CM

## 2023-09-25 DIAGNOSIS — R78.81 STREPTOCOCCAL BACTEREMIA: ICD-10-CM

## 2023-09-25 DIAGNOSIS — I10 ESSENTIAL HYPERTENSION: ICD-10-CM

## 2023-09-25 DIAGNOSIS — B95.5 STREPTOCOCCAL BACTEREMIA: ICD-10-CM

## 2023-09-25 DIAGNOSIS — E11.65 UNCONTROLLED TYPE 2 DIABETES MELLITUS WITH HYPERGLYCEMIA (HCC): ICD-10-CM

## 2023-09-25 PROBLEM — D72.829 LEUKOCYTOSIS: Status: ACTIVE | Noted: 2023-09-25

## 2023-09-25 LAB
ALBUMIN SERPL BCP-MCNC: 3.7 G/DL (ref 3.5–5)
ALP SERPL-CCNC: 86 U/L (ref 34–104)
ALT SERPL W P-5'-P-CCNC: 14 U/L (ref 7–52)
ANION GAP SERPL CALCULATED.3IONS-SCNC: 11 MMOL/L
AST SERPL W P-5'-P-CCNC: 16 U/L (ref 13–39)
BACTERIA UR QL AUTO: NORMAL /HPF
BASOPHILS # BLD AUTO: 0.03 THOUSANDS/ÂΜL (ref 0–0.1)
BASOPHILS NFR BLD AUTO: 0 % (ref 0–1)
BILIRUB SERPL-MCNC: 1.35 MG/DL (ref 0.2–1)
BILIRUB UR QL STRIP: NEGATIVE
BUN SERPL-MCNC: 13 MG/DL (ref 5–25)
CALCIUM SERPL-MCNC: 9 MG/DL (ref 8.4–10.2)
CHLORIDE SERPL-SCNC: 101 MMOL/L (ref 96–108)
CLARITY UR: CLEAR
CO2 SERPL-SCNC: 23 MMOL/L (ref 21–32)
COLOR UR: YELLOW
CREAT SERPL-MCNC: 0.99 MG/DL (ref 0.6–1.3)
EOSINOPHIL # BLD AUTO: 0.07 THOUSAND/ÂΜL (ref 0–0.61)
EOSINOPHIL NFR BLD AUTO: 0 % (ref 0–6)
ERYTHROCYTE [DISTWIDTH] IN BLOOD BY AUTOMATED COUNT: 11.9 % (ref 11.6–15.1)
GFR SERPL CREATININE-BSD FRML MDRD: 77 ML/MIN/1.73SQ M
GLUCOSE SERPL-MCNC: 313 MG/DL (ref 65–140)
GLUCOSE SERPL-MCNC: 346 MG/DL (ref 65–140)
GLUCOSE SERPL-MCNC: 371 MG/DL (ref 65–140)
GLUCOSE UR STRIP-MCNC: ABNORMAL MG/DL
HCT VFR BLD AUTO: 44.7 % (ref 36.5–49.3)
HGB BLD-MCNC: 15.5 G/DL (ref 12–17)
HGB UR QL STRIP.AUTO: ABNORMAL
IMM GRANULOCYTES # BLD AUTO: 0.11 THOUSAND/UL (ref 0–0.2)
IMM GRANULOCYTES NFR BLD AUTO: 1 % (ref 0–2)
KETONES UR STRIP-MCNC: ABNORMAL MG/DL
LEUKOCYTE ESTERASE UR QL STRIP: ABNORMAL
LYMPHOCYTES # BLD AUTO: 1.17 THOUSANDS/ÂΜL (ref 0.6–4.47)
LYMPHOCYTES NFR BLD AUTO: 7 % (ref 14–44)
MCH RBC QN AUTO: 31.4 PG (ref 26.8–34.3)
MCHC RBC AUTO-ENTMCNC: 34.7 G/DL (ref 31.4–37.4)
MCV RBC AUTO: 91 FL (ref 82–98)
MONOCYTES # BLD AUTO: 1.21 THOUSAND/ÂΜL (ref 0.17–1.22)
MONOCYTES NFR BLD AUTO: 7 % (ref 4–12)
NEUTROPHILS # BLD AUTO: 14.53 THOUSANDS/ÂΜL (ref 1.85–7.62)
NEUTS SEG NFR BLD AUTO: 85 % (ref 43–75)
NITRITE UR QL STRIP: NEGATIVE
NON-SQ EPI CELLS URNS QL MICRO: NORMAL /HPF
NRBC BLD AUTO-RTO: 0 /100 WBCS
PH UR STRIP.AUTO: 5.5 [PH]
PLATELET # BLD AUTO: 180 THOUSANDS/UL (ref 149–390)
PMV BLD AUTO: 10.2 FL (ref 8.9–12.7)
POTASSIUM SERPL-SCNC: 4.4 MMOL/L (ref 3.5–5.3)
PROT SERPL-MCNC: 7.3 G/DL (ref 6.4–8.4)
PROT UR STRIP-MCNC: ABNORMAL MG/DL
RBC # BLD AUTO: 4.93 MILLION/UL (ref 3.88–5.62)
RBC #/AREA URNS AUTO: NORMAL /HPF
SODIUM SERPL-SCNC: 135 MMOL/L (ref 135–147)
SP GR UR STRIP.AUTO: >=1.05 (ref 1–1.03)
UROBILINOGEN UR STRIP-ACNC: <2 MG/DL
WBC # BLD AUTO: 17.12 THOUSAND/UL (ref 4.31–10.16)
WBC #/AREA URNS AUTO: NORMAL /HPF

## 2023-09-25 PROCEDURE — 99222 1ST HOSP IP/OBS MODERATE 55: CPT | Performed by: COLON & RECTAL SURGERY

## 2023-09-25 PROCEDURE — 87070 CULTURE OTHR SPECIMN AEROBIC: CPT | Performed by: STUDENT IN AN ORGANIZED HEALTH CARE EDUCATION/TRAINING PROGRAM

## 2023-09-25 PROCEDURE — 99223 1ST HOSP IP/OBS HIGH 75: CPT | Performed by: FAMILY MEDICINE

## 2023-09-25 PROCEDURE — 87075 CULTR BACTERIA EXCEPT BLOOD: CPT | Performed by: STUDENT IN AN ORGANIZED HEALTH CARE EDUCATION/TRAINING PROGRAM

## 2023-09-25 PROCEDURE — 87147 CULTURE TYPE IMMUNOLOGIC: CPT | Performed by: STUDENT IN AN ORGANIZED HEALTH CARE EDUCATION/TRAINING PROGRAM

## 2023-09-25 PROCEDURE — 81001 URINALYSIS AUTO W/SCOPE: CPT | Performed by: FAMILY MEDICINE

## 2023-09-25 PROCEDURE — 87147 CULTURE TYPE IMMUNOLOGIC: CPT | Performed by: FAMILY MEDICINE

## 2023-09-25 PROCEDURE — 99284 EMERGENCY DEPT VISIT MOD MDM: CPT

## 2023-09-25 PROCEDURE — 87185 SC STD ENZYME DETCJ PER NZM: CPT | Performed by: STUDENT IN AN ORGANIZED HEALTH CARE EDUCATION/TRAINING PROGRAM

## 2023-09-25 PROCEDURE — 74177 CT ABD & PELVIS W/CONTRAST: CPT

## 2023-09-25 PROCEDURE — 87077 CULTURE AEROBIC IDENTIFY: CPT | Performed by: STUDENT IN AN ORGANIZED HEALTH CARE EDUCATION/TRAINING PROGRAM

## 2023-09-25 PROCEDURE — 99285 EMERGENCY DEPT VISIT HI MDM: CPT | Performed by: EMERGENCY MEDICINE

## 2023-09-25 PROCEDURE — 36415 COLL VENOUS BLD VENIPUNCTURE: CPT | Performed by: STUDENT IN AN ORGANIZED HEALTH CARE EDUCATION/TRAINING PROGRAM

## 2023-09-25 PROCEDURE — NC001 PR NO CHARGE: Performed by: STUDENT IN AN ORGANIZED HEALTH CARE EDUCATION/TRAINING PROGRAM

## 2023-09-25 PROCEDURE — 87077 CULTURE AEROBIC IDENTIFY: CPT | Performed by: FAMILY MEDICINE

## 2023-09-25 PROCEDURE — 96374 THER/PROPH/DIAG INJ IV PUSH: CPT

## 2023-09-25 PROCEDURE — 87205 SMEAR GRAM STAIN: CPT | Performed by: STUDENT IN AN ORGANIZED HEALTH CARE EDUCATION/TRAINING PROGRAM

## 2023-09-25 PROCEDURE — 46050 I&D PERIANAL ABSCESS SUPFC: CPT | Performed by: COLON & RECTAL SURGERY

## 2023-09-25 PROCEDURE — 80053 COMPREHEN METABOLIC PANEL: CPT | Performed by: STUDENT IN AN ORGANIZED HEALTH CARE EDUCATION/TRAINING PROGRAM

## 2023-09-25 PROCEDURE — 87154 CUL TYP ID BLD PTHGN 6+ TRGT: CPT | Performed by: FAMILY MEDICINE

## 2023-09-25 PROCEDURE — G1004 CDSM NDSC: HCPCS

## 2023-09-25 PROCEDURE — 85025 COMPLETE CBC W/AUTO DIFF WBC: CPT | Performed by: STUDENT IN AN ORGANIZED HEALTH CARE EDUCATION/TRAINING PROGRAM

## 2023-09-25 PROCEDURE — 87040 BLOOD CULTURE FOR BACTERIA: CPT | Performed by: FAMILY MEDICINE

## 2023-09-25 PROCEDURE — 82948 REAGENT STRIP/BLOOD GLUCOSE: CPT

## 2023-09-25 PROCEDURE — 87076 CULTURE ANAEROBE IDENT EACH: CPT | Performed by: STUDENT IN AN ORGANIZED HEALTH CARE EDUCATION/TRAINING PROGRAM

## 2023-09-25 RX ORDER — INSULIN GLARGINE 100 [IU]/ML
10 INJECTION, SOLUTION SUBCUTANEOUS
Status: DISCONTINUED | OUTPATIENT
Start: 2023-09-25 | End: 2023-09-26

## 2023-09-25 RX ORDER — OXYCODONE HYDROCHLORIDE 5 MG/1
5 TABLET ORAL EVERY 4 HOURS PRN
Status: DISCONTINUED | OUTPATIENT
Start: 2023-09-25 | End: 2023-09-28 | Stop reason: HOSPADM

## 2023-09-25 RX ORDER — ACETAMINOPHEN 325 MG/1
650 TABLET ORAL EVERY 6 HOURS PRN
Status: DISCONTINUED | OUTPATIENT
Start: 2023-09-25 | End: 2023-09-28 | Stop reason: HOSPADM

## 2023-09-25 RX ORDER — ONDANSETRON 2 MG/ML
4 INJECTION INTRAMUSCULAR; INTRAVENOUS EVERY 4 HOURS PRN
Status: DISCONTINUED | OUTPATIENT
Start: 2023-09-25 | End: 2023-09-28 | Stop reason: HOSPADM

## 2023-09-25 RX ORDER — ACETAMINOPHEN 325 MG/1
650 TABLET ORAL ONCE
Status: COMPLETED | OUTPATIENT
Start: 2023-09-25 | End: 2023-09-25

## 2023-09-25 RX ORDER — METRONIDAZOLE 500 MG/100ML
500 INJECTION, SOLUTION INTRAVENOUS EVERY 8 HOURS
Status: DISCONTINUED | OUTPATIENT
Start: 2023-09-25 | End: 2023-09-27

## 2023-09-25 RX ORDER — LIDOCAINE HYDROCHLORIDE 10 MG/ML
INJECTION, SOLUTION EPIDURAL; INFILTRATION; INTRACAUDAL; PERINEURAL
Status: DISPENSED
Start: 2023-09-25 | End: 2023-09-26

## 2023-09-25 RX ORDER — ENOXAPARIN SODIUM 100 MG/ML
40 INJECTION SUBCUTANEOUS DAILY
Status: DISCONTINUED | OUTPATIENT
Start: 2023-09-26 | End: 2023-09-28 | Stop reason: HOSPADM

## 2023-09-25 RX ORDER — INSULIN LISPRO 100 [IU]/ML
1-6 INJECTION, SOLUTION INTRAVENOUS; SUBCUTANEOUS
Status: DISCONTINUED | OUTPATIENT
Start: 2023-09-25 | End: 2023-09-26

## 2023-09-25 RX ORDER — LIDOCAINE HYDROCHLORIDE 10 MG/ML
30 INJECTION, SOLUTION EPIDURAL; INFILTRATION; INTRACAUDAL; PERINEURAL ONCE
Status: DISCONTINUED | OUTPATIENT
Start: 2023-09-25 | End: 2023-09-28 | Stop reason: HOSPADM

## 2023-09-25 RX ORDER — CEFAZOLIN SODIUM 2 G/50ML
2000 SOLUTION INTRAVENOUS EVERY 8 HOURS
Status: DISCONTINUED | OUTPATIENT
Start: 2023-09-25 | End: 2023-09-27

## 2023-09-25 RX ORDER — INSULIN LISPRO 100 [IU]/ML
1-5 INJECTION, SOLUTION INTRAVENOUS; SUBCUTANEOUS
Status: DISCONTINUED | OUTPATIENT
Start: 2023-09-25 | End: 2023-09-26

## 2023-09-25 RX ORDER — KETOROLAC TROMETHAMINE 30 MG/ML
15 INJECTION, SOLUTION INTRAMUSCULAR; INTRAVENOUS ONCE
Status: DISCONTINUED | OUTPATIENT
Start: 2023-09-25 | End: 2023-09-25

## 2023-09-25 RX ORDER — HYDROMORPHONE HCL/PF 1 MG/ML
0.5 SYRINGE (ML) INJECTION ONCE
Status: COMPLETED | OUTPATIENT
Start: 2023-09-25 | End: 2023-09-25

## 2023-09-25 RX ORDER — CALCIUM CARBONATE 500 MG/1
500 TABLET, CHEWABLE ORAL DAILY PRN
Status: DISCONTINUED | OUTPATIENT
Start: 2023-09-25 | End: 2023-09-28 | Stop reason: HOSPADM

## 2023-09-25 RX ADMIN — METRONIDAZOLE 500 MG: 500 INJECTION, SOLUTION INTRAVENOUS at 23:36

## 2023-09-25 RX ADMIN — HYDROMORPHONE HYDROCHLORIDE 0.5 MG: 1 INJECTION, SOLUTION INTRAMUSCULAR; INTRAVENOUS; SUBCUTANEOUS at 15:36

## 2023-09-25 RX ADMIN — INSULIN GLARGINE 10 UNITS: 100 INJECTION, SOLUTION SUBCUTANEOUS at 21:48

## 2023-09-25 RX ADMIN — IOHEXOL 100 ML: 350 INJECTION, SOLUTION INTRAVENOUS at 11:56

## 2023-09-25 RX ADMIN — CEFAZOLIN SODIUM 2000 MG: 2 SOLUTION INTRAVENOUS at 22:42

## 2023-09-25 RX ADMIN — ACETAMINOPHEN 650 MG: 325 TABLET, FILM COATED ORAL at 15:36

## 2023-09-25 RX ADMIN — INSULIN LISPRO 4 UNITS: 100 INJECTION, SOLUTION INTRAVENOUS; SUBCUTANEOUS at 21:47

## 2023-09-25 NOTE — ED ATTENDING ATTESTATION
9/25/2023  I, Korin Stein DO, saw and evaluated the patient. I have discussed the patient with the resident/non-physician practitioner and agree with the resident's/non-physician practitioner's findings, Plan of Care, and MDM as documented in the resident's/non-physician practitioner's note, except where noted. All available labs and Radiology studies were reviewed. I was present for key portions of any procedure(s) performed by the resident/non-physician practitioner and I was immediately available to provide assistance. At this point I agree with the current assessment done in the Emergency Department. I have conducted an independent evaluation of this patient a history and physical is as follows:    Chief Complaint   Patient presents with   • Abscess     Pt c/o hard, warm, red "bump" on left butt cheek x1 week. 69-year-old male presents with bump on left butt cheek since last week. Patient states it is painful when he touches it, no pain with defecation, has had fevers, no abdominal pain. Denies history of similar. Is a diabetic and did not take his medication for a few days. On exam-no acute distress, heart regular, no respiratory distress, tender erythematous warm indurated area on left medial buttock. Not fluctuant but is indurated, tender to touch.   Plan-because there is a large area and given location in a diabetic patient we will do CT, check labs and reassess    ED Course         Critical Care Time  Procedures

## 2023-09-25 NOTE — ASSESSMENT & PLAN NOTE
· Documented history of hypertension.   But not on any antihypertensives as outpatient  · Monitor blood pressure while in the hospital

## 2023-09-25 NOTE — CONSULTS
Consultation - Colorectal  Ekta Urias 70 y.o. male MRN: [id number]  Unit/Bed#: [facility] Encounter: [id number]        Assessment/Plan     Assessment:  Ekta Urias is a 70 y.o. 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 male with PMH DM2 on metformin and glimepiride, who presents with left sided buttock pain. Patient states last Thursday he noticed a small raised lesion appear. It has become progressively larger and tender to touch. Endorses constipation Friday-Sunday. Last colonoscopy 04/2021 with multiple polyps removed and grade 1 internal hemorrhoids; upon review of path: one 10mm tubular adenoma removed with recommendation for repeat cscope in 3 years. ED workup with hyperglycemia (346), WBC 17, and fever 100.7F. CT scan c/f perianal/perirectal abscess. Review of Systems   Constitutional: Negative. HENT: Negative. Eyes: Negative. Respiratory: Negative. Cardiovascular: Negative. Gastrointestinal: Negative. Endocrine: Negative. Genitourinary: Negative. Musculoskeletal: Negative. Skin: Positive for wound. Allergic/Immunologic: Negative. Neurological: Negative. Hematological: Negative. Psychiatric/Behavioral: Negative. All other systems reviewed and are negative.       Historical Information   Past Medical History:   Diagnosis Date   • Cyst of skin 04/29/2021    Behind right ear   • Diabetes mellitus ([address])    • Epithelial inclusion cyst 7/7/2021     Past Surgical History: Procedure Laterality Date   • CYST REMOVAL Right 2021    Excision of skin cyst from behind right ear. Office procedure     Social History   Social History     Substance and Sexual Activity   Alcohol Use Yes   • Alcohol/week: 1.0 standard drink of alcohol   • Types: 1 Standard drinks or equivalent per week     Social History     Substance and Sexual Activity   Drug Use No     Social History     Tobacco Use   Smoking Status Never   Smokeless Tobacco Never     Family History:   Family History   Problem Relation Age of Onset   • Hypertension Father    • Colon cancer Neg Hx    • Colon polyps Neg Hx        Meds/Allergies   PTA meds:   Prior to Admission Medications   Prescriptions Last Dose Informant Patient Reported? Taking?    Blood Glucose Monitoring Suppl (FreeStyle Lite) TAMMY  Self No No   Sig: by Percutaneous route 2 (two) times a day   Lancets (freestyle) lancets  Self No No   Sig: Use as instructed E11.65 testing twice a day   glimepiride (AMARYL) 2 mg tablet   No No   Sig: TAKE 1 TABLET BY MOUTH EVERY DAY WITH BREAKFAST   glucose blood (FREESTYLE LITE) test strip   No No   Sig: TEST AS DIRECTED   glucose monitoring kit (FREESTYLE) monitoring kit  Self No No   Si each by Does not apply route 2 (two) times a day before lunch and dinner E11.65   hydrocodone-chlorpheniramine polistirex (TUSSIONEX) 10-8 mg/5 mL ER suspension   No No   Sig: Take 5 mL by mouth every 12 (twelve) hours as needed for cough Max Daily Amount: 10 mL   meloxicam (Mobic) 15 mg tablet   No No   Sig: Take 1 tablet (15 mg total) by mouth daily   metFORMIN (GLUCOPHAGE-XR) 500 mg 24 hr tablet   No No   Sig: TAKE 1 TABLET BY MOUTH EVERY DAY WITH DINNER   sildenafil (VIAGRA) 50 MG tablet   No No   Sig: Take 2 tablets (100 mg total) by mouth as needed for erectile dysfunction      Facility-Administered Medications: None     No Known Allergies    Objective   First Vitals:   Blood Pressure: 151/97 (23 0911)  Pulse: 72 (23 7794)  Temperature: 98.7 °F (37.1 °C) (09/25/23 0911)  Temp Source: Oral (09/25/23 0911)  Respirations: 16 (09/25/23 0911)  SpO2: 94 % (09/25/23 0911)    Current Vitals:   Blood Pressure: 164/84 (09/25/23 1210)  Pulse: 86 (09/25/23 1210)  Temperature: (!) 100.7 °F (38.2 °C) (09/25/23 1519)  Temp Source: Oral (09/25/23 1519)  Respirations: 18 (09/25/23 1210)  SpO2: 96 % (09/25/23 1210)    No intake or output data in the 24 hours ending 09/25/23 1521    Invasive Devices     Peripheral Intravenous Line  Duration           Peripheral IV 09/25/23 Left Antecubital <1 day                Physical Exam  Vitals reviewed. Constitutional:       General: He is not in acute distress. Appearance: He is obese. He is not diaphoretic. HENT:      Head: Normocephalic and atraumatic. Right Ear: External ear normal.      Left Ear: External ear normal.      Nose: Nose normal.      Mouth/Throat:      Mouth: Mucous membranes are moist.      Pharynx: Oropharynx is clear. Eyes:      Extraocular Movements: Extraocular movements intact. Conjunctiva/sclera: Conjunctivae normal.   Cardiovascular:      Rate and Rhythm: Normal rate. Pulmonary:      Effort: Pulmonary effort is normal. No respiratory distress. Abdominal:      General: There is no distension. Genitourinary:     Comments: Foul smelling, Left buttock with erythema, induration, and fluctuance, MARIZOL with tender left sided, small posterior-medial/left fistula tract, multiple small warts to perineum   Musculoskeletal:         General: Normal range of motion. Cervical back: Normal range of motion. Skin:     General: Skin is warm and dry. Neurological:      General: No focal deficit present. Mental Status: He is alert. Psychiatric:         Mood and Affect: Mood normal.         Thought Content:  Thought content normal.         Lab Results:   CBC:   Lab Results   Component Value Date    WBC 17.12 (H) 09/25/2023    HGB 15.5 09/25/2023    HCT 44.7 09/25/2023    MCV 91 09/25/2023     09/25/2023    RBC 4.93 09/25/2023    MCH 31.4 09/25/2023    MCHC 34.7 09/25/2023    RDW 11.9 09/25/2023    MPV 10.2 09/25/2023    NRBC 0 09/25/2023   , CMP:   Lab Results   Component Value Date    SODIUM 135 09/25/2023    K 4.4 09/25/2023     09/25/2023    CO2 23 09/25/2023    BUN 13 09/25/2023    CREATININE 0.99 09/25/2023    CALCIUM 9.0 09/25/2023    AST 16 09/25/2023    ALT 14 09/25/2023    ALKPHOS 86 09/25/2023    EGFR 77 09/25/2023   , Coagulation: No results found for: "PT", "INR", "APTT", Urinalysis: No results found for: "COLORU", "CLARITYU", "Edward Gauss", "PHUR", "LEUKOCYTESUR", "NITRITE", "PROTEINUA", "GLUCOSEU", "Marzella Minor", "BILIRUBINUR", "BLOODU", Amylase: No results found for: "AMYLASE", Lipase: No results found for: "LIPASE"  Imaging: I have personally reviewed pertinent reports. EKG, Pathology, and Other Studies: I have personally reviewed pertinent reports.       Code Status: No Order  Advance Directive and Living Will:      Power of :    POLST:

## 2023-09-25 NOTE — ASSESSMENT & PLAN NOTE
· Patient scented to the hospital with left-sided perirectal abscess. 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  Suggestion of an underlying poorly defined collection here measuring on the order of 3.4 by 2.3 cm may represent developing abscess  · Status post I&D of the perirectal abscess by colorectal surgery  · Follow-up on blood culture and also the wound culture  · Continue with cefazolin and Flagyl for now  · Colorectal surgery follow-up  · Patient getting about going home

## 2023-09-25 NOTE — ED PROVIDER NOTES
History  Chief Complaint   Patient presents with   • Abscess     Pt c/o hard, warm, red "bump" on left butt cheek x1 week. 40-year-old male with past medical history of DM, HTN, presents with a painful lump on his upper left buttock. Patient states he first noticed the lump 2 days ago has rapidly progressed since. Patient has been placing warm compresses on the affected area. He also began experiencing fevers and chills on Saturday but did not measure temperature. Patient has not been taking antihyperglycemics. No chest pain, SOB, N/V          Prior to Admission Medications   Prescriptions Last Dose Informant Patient Reported? Taking?    Blood Glucose Monitoring Suppl (FreeStyle Lite) TAMMY  Self No No   Sig: by Percutaneous route 2 (two) times a day   Lancets (freestyle) lancets  Self No No   Sig: Use as instructed E11.65 testing twice a day   glimepiride (AMARYL) 2 mg tablet   No No   Sig: TAKE 1 TABLET BY MOUTH EVERY DAY WITH BREAKFAST   glucose blood (FREESTYLE LITE) test strip   No No   Sig: TEST AS DIRECTED   glucose monitoring kit (FREESTYLE) monitoring kit  Self No No   Si each by Does not apply route 2 (two) times a day before lunch and dinner E11.65   hydrocodone-chlorpheniramine polistirex (TUSSIONEX) 10-8 mg/5 mL ER suspension   No No   Sig: Take 5 mL by mouth every 12 (twelve) hours as needed for cough Max Daily Amount: 10 mL   meloxicam (Mobic) 15 mg tablet   No No   Sig: Take 1 tablet (15 mg total) by mouth daily   metFORMIN (GLUCOPHAGE-XR) 500 mg 24 hr tablet   No No   Sig: TAKE 1 TABLET BY MOUTH EVERY DAY WITH DINNER   sildenafil (VIAGRA) 50 MG tablet   No No   Sig: Take 2 tablets (100 mg total) by mouth as needed for erectile dysfunction      Facility-Administered Medications: None       Past Medical History:   Diagnosis Date   • Cyst of skin 2021    Behind right ear   • Diabetes mellitus ([address])    • Epithelial inclusion cyst 2021       Past Surgical History:   Procedure Laterality Date   • CYST REMOVAL Right 04/29/2021    Excision of skin cyst from behind right ear. Office procedure       Family History   Problem Relation Age of Onset   • Hypertension Father    • Colon cancer Neg Hx    • Colon polyps Neg Hx      I have reviewed and agree with the history as documented. E-Cigarette/Vaping   • E-Cigarette Use Never User      E-Cigarette/Vaping Substances     Social History     Tobacco Use   • Smoking status: Never   • Smokeless tobacco: Never   Vaping Use   • Vaping Use: Never used   Substance Use Topics   • Alcohol use: Yes     Alcohol/week: 1.0 standard drink of alcohol     Types: 1 Standard drinks or equivalent per week   • Drug use: No        Review of Systems   Constitutional: Positive for chills and fever. All other systems reviewed and are negative. Physical Exam  ED Triage Vitals [09/25/23 0911]   Temperature Pulse Respirations Blood Pressure SpO2   98.7 °F (37.1 °C) 72 16 151/97 94 %      Temp Source Heart Rate Source Patient Position - Orthostatic VS BP Location FiO2 (%)   Oral Monitor Sitting Right arm --      Pain Score       2             Orthostatic Vital Signs  Vitals:    09/25/23 0911 09/25/23 1210   BP: 151/97 164/84   Pulse: 72 86   Patient Position - Orthostatic VS: Sitting Lying       Physical Exam   Gen: NAD, AA&Ox3  HEENT: PERRL, EOMI  Neck: supple  CV: RRR  Lungs: CTA B/L  Abdomen: soft, NT/ND, no rebound  Ext: no swelling or deformity  Neuro: 5/5 strength all extremities, sensation grossly intact  Skin: no rash  : Large indurated area, upper L buttocks.  TTP    ED Medications  Medications   lidocaine (PF) (XYLOCAINE-MPF) 1 % injection 30 mL (has no administration in time range)   iohexol (OMNIPAQUE) 350 MG/ML injection (SINGLE-DOSE) 100 mL (100 mL Intravenous Given 9/25/23 1156)   acetaminophen (TYLENOL) tablet 650 mg (650 mg Oral Given 9/25/23 1536)   HYDROmorphone (DILAUDID) injection 0.5 mg (0.5 mg Intravenous Given 9/25/23 1536) Diagnostic Studies  Results Reviewed     Procedure Component Value Units Date/Time    Wound culture and Gram stain [[id number]]     Lab Status: No result Specimen: Wound from Buttock     Anaerobic culture and Gram stain [[id number]]     Lab Status: No result Specimen: Abscess     UA w Reflex to Microscopic w Reflex to Culture [[id number]]     Lab Status: No result Specimen: Urine     Blood culture [[id number]]     Lab Status: No result Specimen: Blood     Blood culture [[id number]]     Lab Status: No result Specimen: Blood     Comprehensive metabolic panel [[id number]]  (Abnormal) Collected: 09/25/23 1043    Lab Status: Final result Specimen: Blood from Arm, Left Updated: 09/25/23 1141     Sodium 135 mmol/L      Potassium 4.4 mmol/L      Chloride 101 mmol/L      CO2 23 mmol/L      ANION GAP 11 mmol/L      BUN 13 mg/dL      Creatinine 0.99 mg/dL      Glucose 346 mg/dL      Calcium 9.0 mg/dL      AST 16 U/L      ALT 14 U/L      Alkaline Phosphatase 86 U/L      Total Protein 7.3 g/dL      Albumin 3.7 g/dL      Total Bilirubin 1.35 mg/dL      eGFR 77 ml/min/1.73sq m     Narrative:      [place] guidelines for Chronic Kidney Disease (CKD):   •  Stage 1 with normal or high GFR (GFR > 90 mL/min/1.73 square meters)  •  Stage 2 Mild CKD (GFR = 60-89 mL/min/1.73 square meters)  •  Stage 3A Moderate CKD (GFR = 45-59 mL/min/1.73 square meters)  •  Stage 3B Moderate CKD (GFR = 30-44 mL/min/1.73 square meters)  •  Stage 4 Severe CKD (GFR = 15-29 mL/min/1.73 square meters)  •  Stage 5 End Stage CKD (GFR <15 mL/min/1.73 square meters)  Note: GFR calculation is accurate only with a steady state creatinine    CBC and differential [[id number]]  (Abnormal) Collected: 09/25/23 1043    Lab Status: Final result Specimen: Blood from Arm, Left Updated: 09/25/23 1109     WBC 17.12 Thousand/uL      RBC 4.93 Million/uL      Hemoglobin 15.5 g/dL      Hematocrit 44.7 %      MCV 91 fL      MCH 31.4 pg      MCHC 34.7 g/dL RDW 11.9 %      MPV 10.2 fL      Platelets 334 Thousands/uL      nRBC 0 /100 WBCs      Neutrophils Relative 85 %      Immat GRANS % 1 %      Lymphocytes Relative 7 %      Monocytes Relative 7 %      Eosinophils Relative 0 %      Basophils Relative 0 %      Neutrophils Absolute 14.53 Thousands/µL      Immature Grans Absolute 0.11 Thousand/uL      Lymphocytes Absolute 1.17 Thousands/µL      Monocytes Absolute 1.21 Thousand/µL      Eosinophils Absolute 0.07 Thousand/µL      Basophils Absolute 0.03 Thousands/µL                  CT abdomen pelvis with contrast   Final Result by Reji Gray MD (09/25 1350)      1. Extensive infiltrative changes in the perianal/perirectal region posteriorly. Suggestion of an underlying poorly defined collection here measuring on the order of 3.4 by 2.3 cm may represent developing abscess. The study was marked in [facility] for immediate notification. Workstation performed: [id number]               Procedures  Procedures      ED Course                          Initial Sepsis Screening     Row Name 09/25/23 1642                Is the patient's history suggestive of a new or worsening infection? Yes (Proceed)  -[address]        Suspected source of infection soft tissue  -[address]        Indicate SIRS criteria Leukocytosis (WBC > 06640 IJL) OR Leukopenia (WBC <4000 IJL) OR Bandemia (WBC >10% bands)  -MK        Are two or more of the above signs & symptoms of infection both present and new to the patient? No  -[place]  (r) = Recorded By, (t) = Taken By, (c) = Cosigned By    [address] [place] [address] Name Provider Type    Ya Kirk MD Resident                SBIRT 20yo+    Flowsheet Row Most Recent Value   Initial Alcohol Screen: US AUDIT-C     1. How often do you have a drink containing alcohol? 0 Filed at: 09/25/2023 0913   2. How many drinks containing alcohol do you have on a typical day you are drinking? 0 Filed at: 09/25/2023 0913   3a. Male UNDER 65:  How often do you have five or more drinks on one occasion? 0 Filed at: 09/25/2023 0913   3b. FEMALE Any Age, or MALE 65+: How often do you have 4 or more drinks on one occassion? 0 Filed at: 09/25/2023 0913   Audit-C Score 0 Filed at: 09/25/2023 5840   CARMELLA: How many times in the past year have you. .. Used an illegal drug or used a prescription medication for non-medical reasons? Never Filed at: 09/25/2023 0913                Medical Decision Making  57-year-old male with perianal abscess. Patient complains of fevers and chills. Labs and CT to assess for size and poss rectal involvement. HPI and exam not c/w Simba's. Leukocytosis on CBC, does not meet SIRS criteria     CT with lg abscess, will consult colorectal surgery     Surgery recommends medicine admit. Pt accepted to hospitalist service as inpatient. Perirectal abscess: acute illness or injury  Amount and/or Complexity of Data Reviewed  Labs: ordered. Radiology: ordered. Risk  OTC drugs. Prescription drug management. Decision regarding hospitalization. Disposition  Final diagnoses:   Perirectal abscess     Time reflects when diagnosis was documented in both MDM as applicable and the Disposition within this note     Time User Action Codes Description Comment    9/25/2023  2:37 PM Jean Wild Add [K61.1] Perirectal abscess       ED Disposition     ED Disposition   Admit    Condition   Stable    Date/Time   Mon Sep 25, 2023  4:41 PM    Comment   Case was discussed with Dr Tommy Jimenez and the patient's admission status was agreed to be Admission Status: inpatient status to the service of Dr. Owen Cortez . Follow-up Information    None         Patient's Medications   Discharge Prescriptions    No medications on file     No discharge procedures on file. PDMP Review       Value Time User    PDMP Reviewed  Yes 12/5/2022  4:30 PM Arlester Duty, DO           ED Provider  Attending physically available and evaluated Anna Pin.  I managed the patient along with the ED Attending.     Electronically Signed by         Raymond Christianson MD  09/25/23 6032

## 2023-09-25 NOTE — PROCEDURES
Incision and drain    Date/Time: 9/25/2023 4:28 PM    Performed by: Asia Kenyon MD  Authorized by: Asia Kenyon MD  Universal Protocol:  Procedure performed by: (Dr. Jesus Rivas)  Consent: Verbal consent obtained. Risks and benefits: risks, benefits and alternatives were discussed  Consent given by: patient  Patient understanding: patient states understanding of the procedure being performed  Relevant documents: relevant documents present and verified  Test results: test results available and properly labeled  Radiology Images displayed and confirmed. If images not available, report reviewed: imaging studies available  Patient identity confirmed: verbally with patient, arm band and hospital-assigned identification number      Patient location:  ED  Location:     Type:  Abscess    Location:  Anogenital    Anogenital location:  Perirectal  Pre-procedure details:     Skin preparation:  Chloraprep  Sedation:     Sedation type: Anxiolysis  Anesthesia (see MAR for exact dosages): Anesthesia method:  Local infiltration    Local anesthetic:  Lidocaine 1% w/o epi  Procedure details:     Complexity:  Intermediate    Needle aspiration: no      Incision types:  Cruciate    Scalpel blade:  11    Approach:  Open    Incision depth:  Deep    Wound management:  Probed and deloculated, irrigated with saline and extensive cleaning    Drainage:  Purulent    Drainage amount:  Copious    Wound treatment:  Packing placed    Packing materials:  1/2 in gauze  Post-procedure details:     Patient tolerance of procedure:   Tolerated well, no immediate complications

## 2023-09-25 NOTE — H&P
[facility]  H&P  Name: Anna Díaz 70 y.o. male I MRN: [id number]  Unit/Bed#: -01 I Date of Admission: 9/25/2023   Date of Service: 9/25/2023 I Hospital Day: 0      Assessment/Plan   * Perirectal abscess  Assessment & Plan  · Patient scented to the hospital with left-sided perirectal abscess. 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 Suggestion of an underlying poorly defined collection here measuring on the order of 3.4 by 2.3 cm may represent developing abscess  · Status post I&D of the perirectal abscess by colorectal surgery  · Follow-up on blood culture and also the wound culture  · Continue with cefazolin and Flagyl for now  · Colorectal surgery follow-up  · Patient getting about going home    Leukocytosis  Assessment & Plan  · Presented with a leukocytosis secondary to perirectal abscess. · Trend leukocytosis    Class 2 severe obesity with body mass index (BMI) of 35 to 39.9 with serious comorbidity (HCC)  Assessment & Plan  · TLC as outpatient    Type 2 diabetes mellitus without complication, without long-term current use of insulin (HCC)  Assessment & Plan  Lab Results   Component Value Date    HGBA1C 7.1 (A) 07/18/2022       No results for input(s): "POCGLU" in the last 72 hours. Blood Sugar Average: Last 72 hrs:  Patient with previous hemoglobin A1c 7.1. Last hemoglobin A1c was done 14 months ago  Recheck hemoglobin A1c. Blood sugar 345 at the time of presentation. Patient is on metformin and Amaryl as outpatient and reported that he is compliant with his medications. Start insulin Lantus at 10 eighths nightly. Add sliding scale  Add mealtime insulin  Monitor blood sugar and uptitrate the insulin dose per blood sugar.     Current elevated blood sugar could be related to acute infectious process    Essential hypertension  Assessment & Plan  · Documented history of hypertension. But not on any antihypertensives as outpatient  · Monitor blood pressure while in the hospital          VTE Pharmacologic Prophylaxis: VTE Score: 4 Moderate Risk (Score 3-4) - Pharmacological DVT Prophylaxis Ordered: enoxaparin (Lovenox). Code Status: Level 1 - Full Code   Discussion with family: Updated  (wife) at bedside. Anticipated Length of Stay: Patient will be admitted on an inpatient basis with an anticipated length of stay of greater than 2 midnights secondary to Perirectal abscess. Total Time Spent on Date of Encounter in care of patient: 65 mins. This time was spent on one or more of the following: performing physical exam; counseling and coordination of care; obtaining or reviewing history; documenting in the medical record; reviewing/ordering tests, medications or procedures; communicating with other healthcare professionals and discussing with patient's family/caregivers. Chief Complaint- painful lump in the left buttocks    History of Present Illness:  Juan Alberto Gil is a 70 y.o. male with a PMH of this mellitus, hypertension who presents with painful lump in the left buttocks. Patient reported that his symptoms started on last Thursday. Patient also felt really tired and he had intermittent fever and chills. Patient never actually took the temperature but he felt like he had a low-grade fever going on. He came to the hospital because of persistent pain and swelling of the left buttocks. Had a CAT scan in the emergency room was concerning for perirectal/perianal abscess. Evaluated by colorectal surgery and had I&D done at bedside. Currently patient is feeling better. Denied any specific complaints. Patient was started on antibiotics by colorectal surgery. Patient is admitted for further management.   On discussion with the patient it appears that the patient has been compliant with his diabetic medications. Patient reported that he has been checking his blood sugar recently. Last yesterday his blood sugar was 300. Patient reported that he is somewhat compliant with the diabetic diet . Review of Systems:  Review of Systems   Constitutional: Positive for chills, fatigue and fever. HENT: Negative. Respiratory: Negative. Gastrointestinal: Positive for constipation. Endocrine: Negative. Genitourinary: Negative. Skin:        Painful lump in the left buttocks   Allergic/Immunologic: Negative. Neurological: Negative. Hematological: Negative. Psychiatric/Behavioral: Negative. Past Medical and Surgical History:   Past Medical History:   Diagnosis Date   • Cyst of skin 04/29/2021    Behind right ear   • Diabetes mellitus ([address])    • Epithelial inclusion cyst 7/7/2021       Past Surgical History:   Procedure Laterality Date   • CYST REMOVAL Right 04/29/2021    Excision of skin cyst from behind right ear. Office procedure       Meds/Allergies:  Prior to Admission medications    Medication Sig Start Date End Date Taking?  Authorizing Provider   Blood Glucose Monitoring Suppl (FreeStyle Lite) TAMMY by Percutaneous route 2 (two) times a day 10/2/20   Mitchell Moraes DO   glimepiride (AMARYL) 2 mg tablet TAKE 1 TABLET BY MOUTH EVERY DAY WITH BREAKFAST 5/7/23   Mitchell Moraes DO   glucose blood (FREESTYLE LITE) test strip TEST AS DIRECTED 7/7/21   YUN Rm   glucose monitoring kit (FREESTYLE) monitoring kit 1 each by Does not apply route 2 (two) times a day before lunch and dinner E11.65 10/22/18   Gregg Kent DO   hydrocodone-chlorpheniramine polistirex (TUSSIONEX) 10-8 mg/5 mL ER suspension Take 5 mL by mouth every 12 (twelve) hours as needed for cough Max Daily Amount: 10 mL 12/5/22   Mitchell Moraes DO   Lancets (freestyle) lancets Use as instructed E11.65 testing twice a day 10/2/20   Mitchell Moraes DO   meloxicam (Mobic) 15 mg tablet Take 1 tablet (15 mg total) by mouth daily 3/10/23 4/9/23  Beth Umair RADHAWELLINGTON   metFORMIN (GLUCOPHAGE-XR) 500 mg 24 hr tablet TAKE 1 TABLET BY MOUTH EVERY DAY WITH DINNER 8/18/23   Augusta Mckenna DO   sildenafil (VIAGRA) 50 MG tablet Take 2 tablets (100 mg total) by mouth as needed for erectile dysfunction 8/17/22   Augusta Mckenna DO     I have reviewed home medications with patient personally. Allergies: No Known Allergies    Social History:  Marital Status: Single   Occupation:   Patient Pre-hospital Living Situation: home  Patient Pre-hospital Level of Mobility: walks  Patient Pre-hospital Diet Restrictions:   Substance Use History:   Social History     Substance and Sexual Activity   Alcohol Use Yes   • Alcohol/week: 1.0 standard drink of alcohol   • Types: 1 Standard drinks or equivalent per week     Social History     Tobacco Use   Smoking Status Never   Smokeless Tobacco Never     Social History     Substance and Sexual Activity   Drug Use No       Family History:  Family History   Problem Relation Age of Onset   • Hypertension Father    • Colon cancer Neg Hx    • Colon polyps Neg Hx        Physical Exam:     Vitals:   Blood Pressure: 150/97 (09/25/23 1736)  Pulse: 85 (09/25/23 1736)  Temperature: 99.4 °F (37.4 °C) (09/25/23 1736)  Temp Source: Oral (09/25/23 1519)  Respirations: 16 (09/25/23 1736)  SpO2: 92 % (09/25/23 1736)    Physical Exam  Constitutional:       General: He is not in acute distress. HENT:      Head: Normocephalic. Nose: Nose normal.   Eyes:      General: No scleral icterus. Cardiovascular:      Rate and Rhythm: Normal rate and regular rhythm. Pulses: Normal pulses. Heart sounds: Normal heart sounds. Pulmonary:      Effort: Pulmonary effort is normal. No respiratory distress. Breath sounds: Normal breath sounds. Abdominal:      General: Abdomen is flat. There is no distension. Tenderness: There is no abdominal tenderness.    Genitourinary:     Comments: Left buttocks abscess site covered in dressing. Status post I&D   Musculoskeletal:         General: Normal range of motion. Skin:     General: Skin is warm. Coloration: Skin is not jaundiced. Neurological:      General: No focal deficit present. Mental Status: He is alert. Mental status is at baseline. Additional Data:     Lab Results:  Results from last 7 days   Lab Units 09/25/23  1043   WBC Thousand/uL 17.12*   HEMOGLOBIN g/dL 15.5   HEMATOCRIT % 44.7   PLATELETS Thousands/uL 180   NEUTROS PCT % 85*   LYMPHS PCT % 7*   MONOS PCT % 7   EOS PCT % 0     Results from last 7 days   Lab Units 09/25/23  1043   SODIUM mmol/L 135   POTASSIUM mmol/L 4.4   CHLORIDE mmol/L 101   CO2 mmol/L 23   BUN mg/dL 13   CREATININE mg/dL 0.99   ANION GAP mmol/L 11   CALCIUM mg/dL 9.0   ALBUMIN g/dL 3.7   TOTAL BILIRUBIN mg/dL 1.35*   ALK PHOS U/L 86   ALT U/L 14   AST U/L 16   GLUCOSE RANDOM mg/dL 346*                       Lines/Drains:  Invasive Devices     Peripheral Intravenous Line  Duration           Peripheral IV 09/25/23 Left Antecubital <1 day                    Imaging: Reviewed radiology reports from this admission including: abdominal/pelvic CT  CT abdomen pelvis with contrast   Final Result by Fatou Solomon MD (09/25 1350)      1. Extensive infiltrative changes in the perianal/perirectal region posteriorly. Suggestion of an underlying poorly defined collection here measuring on the order of 3.4 by 2.3 cm may represent developing abscess. The study was marked in [facility] for immediate notification. Workstation performed: [id number]             EKG and Other Studies Reviewed on Admission:   · EKG: No EKG obtained. ** Please Note: This note has been constructed using a voice recognition system.  **

## 2023-09-25 NOTE — ASSESSMENT & PLAN NOTE
Lab Results   Component Value Date    HGBA1C 7.1 (A) 07/18/2022       No results for input(s): "POCGLU" in the last 72 hours. Blood Sugar Average: Last 72 hrs:  Patient with previous hemoglobin A1c 7.1. Last hemoglobin A1c was done 14 months ago  Recheck hemoglobin A1c. Blood sugar 345 at the time of presentation. Patient is on metformin and Amaryl as outpatient and reported that he is compliant with his medications. Start insulin Lantus at 10 eighths nightly. Add sliding scale  Add mealtime insulin  Monitor blood sugar and uptitrate the insulin dose per blood sugar.     Current elevated blood sugar could be related to acute infectious process

## 2023-09-25 NOTE — PLAN OF CARE
Problem: MOBILITY - ADULT  Goal: Maintain or return to baseline ADL function  Description: INTERVENTIONS:  -  Assess patient's ability to carry out ADLs; assess patient's baseline for ADL function and identify physical deficits which impact ability to perform ADLs (bathing, care of mouth/teeth, toileting, grooming, dressing, etc.)  - Assess/evaluate cause of self-care deficits   - Assess range of motion  - Assess patient's mobility; develop plan if impaired  - Assess patient's need for assistive devices and provide as appropriate  - Encourage maximum independence but intervene and supervise when necessary  - Involve family in performance of ADLs  - Assess for home care needs following discharge   - Consider OT consult to assist with ADL evaluation and planning for discharge  - Provide patient education as appropriate  Outcome: Progressing  Goal: Maintains/Returns to pre admission functional level  Description: INTERVENTIONS:  - Perform BMAT or MOVE assessment daily.   - Set and communicate daily mobility goal to care team and patient/family/caregiver. - Collaborate with rehabilitation services on mobility goals if consulted  - Perform Range of Motion 3 times a day. - Reposition patient every 2 hours.   - Dangle patient 3 times a day  - Stand patient 3 times a day  - Ambulate patient 3 times a day  - Out of bed to chair 3 times a day   - Out of bed for meals 3 times a day  - Out of bed for toileting  - Record patient progress and toleration of activity level   Outcome: Progressing     Problem: PAIN - ADULT  Goal: Verbalizes/displays adequate comfort level or baseline comfort level  Description: Interventions:  - Encourage patient to monitor pain and request assistance  - Assess pain using appropriate pain scale  - Administer analgesics based on type and severity of pain and evaluate response  - Implement non-pharmacological measures as appropriate and evaluate response  - Consider cultural and social influences on pain and pain management  - Notify physician/advanced practitioner if interventions unsuccessful or patient reports new pain  Outcome: Progressing     Problem: INFECTION - ADULT  Goal: Absence or prevention of progression during hospitalization  Description: INTERVENTIONS:  - Assess and monitor for signs and symptoms of infection  - Monitor lab/diagnostic results  - Monitor all insertion sites, i.e. indwelling lines, tubes, and drains  - Monitor endotracheal if appropriate and nasal secretions for changes in amount and color  - Fenton appropriate cooling/warming therapies per order  - Administer medications as ordered  - Instruct and encourage patient and family to use good hand hygiene technique  - Identify and instruct in appropriate isolation precautions for identified infection/condition  Outcome: Progressing     Problem: SAFETY ADULT  Goal: Patient will remain free of falls  Description: INTERVENTIONS:  - Educate patient/family on patient safety including physical limitations  - Instruct patient to call for assistance with activity   - Consult OT/PT to assist with strengthening/mobility   - Keep Call bell within reach  - Keep bed low and locked with side rails adjusted as appropriate  - Keep care items and personal belongings within reach  - Initiate and maintain comfort rounds  - Make Fall Risk Sign visible to staff  - Offer Toileting every  Hours, in advance of need  - Initiate/Maintain alarm  - Obtain necessary fall risk management equipment: non-skid footwear, pt instructed to call for assistance, call bell in reach and pt rings appropriately, pt in room near nurses' station  - Apply yellow socks and bracelet for high fall risk patients  - Consider moving patient to room near nurses station  Outcome: Progressing     Problem: DISCHARGE PLANNING  Goal: Discharge to home or other facility with appropriate resources  Description: INTERVENTIONS:  - Identify barriers to discharge w/patient and caregiver  - Arrange for needed discharge resources and transportation as appropriate  - Identify discharge learning needs (meds, wound care, etc.)  - Arrange for interpretive services to assist at discharge as needed  - Refer to Case Management Department for coordinating discharge planning if the patient needs post-hospital services based on physician/advanced practitioner order or complex needs related to functional status, cognitive ability, or social support system  Outcome: Progressing     Problem: Knowledge Deficit  Goal: Patient/family/caregiver demonstrates understanding of disease process, treatment plan, medications, and discharge instructions  Description: Complete learning assessment and assess knowledge base.   Interventions:  - Provide teaching at level of understanding  - Provide teaching via preferred learning methods  Outcome: Progressing

## 2023-09-25 NOTE — QUICK NOTE
Post Op Check Note - Surgery Resident  Keila Warren 70 y.o. male MRN: [id number]  Unit/Bed#: -Chris Encounter: [id number]    ASSESSMENT:  Keila Warren is a 70 y.o. male who is status post L perianal abscess I&D bedside    Subjective: Feels much better after I&D and after eating dinner    Physical Exam:  GEN: NAD  CV: RRR  Lung: Normal effort  Ab: Soft, NT/ND  Neuro: A+Ox3  Incisions: L perianal incision open/packed with some SS output, replaced gauze (soaked in SS). No active bleeding    Blood pressure 150/97, pulse 85, temperature 99.4 °F (37.4 °C), temperature source Oral, resp. rate 16, height 5' 10" (1.778 m), weight 105 kg (232 lb), SpO2 92 %. ,Body mass index is 33.29 kg/m².     No intake or output data in the 24 hours ending 09/25/23 1847    Invasive Devices     Peripheral Intravenous Line  Duration           Peripheral IV 09/25/23 Left Antecubital <1 day

## 2023-09-26 ENCOUNTER — HOME HEALTH ADMISSION (OUTPATIENT)
Dept: HOME HEALTH SERVICES | Facility: HOME HEALTHCARE | Age: 68
End: 2023-09-26

## 2023-09-26 LAB
BASOPHILS # BLD AUTO: 0.02 THOUSANDS/ÂΜL (ref 0–0.1)
BASOPHILS NFR BLD AUTO: 0 % (ref 0–1)
EOSINOPHIL # BLD AUTO: 0.12 THOUSAND/ÂΜL (ref 0–0.61)
EOSINOPHIL NFR BLD AUTO: 1 % (ref 0–6)
ERYTHROCYTE [DISTWIDTH] IN BLOOD BY AUTOMATED COUNT: 11.6 % (ref 11.6–15.1)
EST. AVERAGE GLUCOSE BLD GHB EST-MCNC: 266 MG/DL
GLUCOSE SERPL-MCNC: 146 MG/DL (ref 65–140)
GLUCOSE SERPL-MCNC: 179 MG/DL (ref 65–140)
GLUCOSE SERPL-MCNC: 200 MG/DL (ref 65–140)
GLUCOSE SERPL-MCNC: 232 MG/DL (ref 65–140)
GLUCOSE SERPL-MCNC: 244 MG/DL (ref 65–140)
GLUCOSE SERPL-MCNC: 277 MG/DL (ref 65–140)
GLUCOSE SERPL-MCNC: 298 MG/DL (ref 65–140)
GLUCOSE SERPL-MCNC: 377 MG/DL (ref 65–140)
HBA1C MFR BLD: 10.9 %
HCT VFR BLD AUTO: 43 % (ref 36.5–49.3)
HGB BLD-MCNC: 15.4 G/DL (ref 12–17)
IMM GRANULOCYTES # BLD AUTO: 0.07 THOUSAND/UL (ref 0–0.2)
IMM GRANULOCYTES NFR BLD AUTO: 1 % (ref 0–2)
LYMPHOCYTES # BLD AUTO: 0.94 THOUSANDS/ÂΜL (ref 0.6–4.47)
LYMPHOCYTES NFR BLD AUTO: 8 % (ref 14–44)
MCH RBC QN AUTO: 32.4 PG (ref 26.8–34.3)
MCHC RBC AUTO-ENTMCNC: 35.8 G/DL (ref 31.4–37.4)
MCV RBC AUTO: 90 FL (ref 82–98)
MONOCYTES # BLD AUTO: 0.94 THOUSAND/ÂΜL (ref 0.17–1.22)
MONOCYTES NFR BLD AUTO: 8 % (ref 4–12)
NEUTROPHILS # BLD AUTO: 10.34 THOUSANDS/ÂΜL (ref 1.85–7.62)
NEUTS SEG NFR BLD AUTO: 82 % (ref 43–75)
NRBC BLD AUTO-RTO: 0 /100 WBCS
PLATELET # BLD AUTO: 150 THOUSANDS/UL (ref 149–390)
PMV BLD AUTO: 9.3 FL (ref 8.9–12.7)
RBC # BLD AUTO: 4.76 MILLION/UL (ref 3.88–5.62)
STREPTOCOCCUS DNA BLD POS NAA+NON-PROBE: DETECTED
WBC # BLD AUTO: 12.43 THOUSAND/UL (ref 4.31–10.16)

## 2023-09-26 PROCEDURE — 85025 COMPLETE CBC W/AUTO DIFF WBC: CPT | Performed by: PHYSICIAN ASSISTANT

## 2023-09-26 PROCEDURE — 83036 HEMOGLOBIN GLYCOSYLATED A1C: CPT | Performed by: FAMILY MEDICINE

## 2023-09-26 PROCEDURE — 82948 REAGENT STRIP/BLOOD GLUCOSE: CPT

## 2023-09-26 PROCEDURE — 99232 SBSQ HOSP IP/OBS MODERATE 35: CPT | Performed by: INTERNAL MEDICINE

## 2023-09-26 PROCEDURE — 99223 1ST HOSP IP/OBS HIGH 75: CPT | Performed by: INTERNAL MEDICINE

## 2023-09-26 PROCEDURE — 99024 POSTOP FOLLOW-UP VISIT: CPT | Performed by: COLON & RECTAL SURGERY

## 2023-09-26 RX ORDER — LORAZEPAM 0.5 MG/1
0.5 TABLET ORAL EVERY 8 HOURS PRN
Status: DISCONTINUED | OUTPATIENT
Start: 2023-09-26 | End: 2023-09-28 | Stop reason: HOSPADM

## 2023-09-26 RX ADMIN — METRONIDAZOLE 500 MG: 500 INJECTION, SOLUTION INTRAVENOUS at 08:33

## 2023-09-26 RX ADMIN — METRONIDAZOLE 500 MG: 500 INJECTION, SOLUTION INTRAVENOUS at 16:53

## 2023-09-26 RX ADMIN — SODIUM CHLORIDE 5 UNITS/HR: 9 INJECTION, SOLUTION INTRAVENOUS at 12:08

## 2023-09-26 RX ADMIN — METRONIDAZOLE 500 MG: 500 INJECTION, SOLUTION INTRAVENOUS at 23:15

## 2023-09-26 RX ADMIN — CEFAZOLIN SODIUM 2000 MG: 2 SOLUTION INTRAVENOUS at 16:13

## 2023-09-26 RX ADMIN — CEFAZOLIN SODIUM 2000 MG: 2 SOLUTION INTRAVENOUS at 22:14

## 2023-09-26 RX ADMIN — INSULIN LISPRO 4 UNITS: 100 INJECTION, SOLUTION INTRAVENOUS; SUBCUTANEOUS at 06:18

## 2023-09-26 RX ADMIN — CEFAZOLIN SODIUM 2000 MG: 2 SOLUTION INTRAVENOUS at 08:23

## 2023-09-26 RX ADMIN — SODIUM CHLORIDE 1 UNITS/HR: 9 INJECTION, SOLUTION INTRAVENOUS at 16:12

## 2023-09-26 NOTE — PROGRESS NOTES
[facility]  Progress Note  Name: Nitesh Orozco  MRN: [id number]  Unit/Bed#: -39 I Date of Admission: 9/25/2023   Date of Service: 9/26/2023 I Hospital Day: 1 1/2 blood cx + for gram positive cocci in pairs and chains. Other pending. Will await other blood culture prior to considering ID eval.    Assessment/Plan   * Perirectal abscess  Assessment & Plan  Patient scented to the hospital with left-sided perirectal abscess  · CT scan reviewed-  Extensive infiltrative changes in the perianal/perirectal region posteriorly. Suggestion of an underlying poorly defined collection here measuring on the order of 3.4 by 2.3 cm may represent developing abscess  · Status post I&D of the perirectal abscess by colorectal surgery upon admission   · Follow-up on blood culture and also the wound culture  · Continue with cefazolin and Flagyl for now  · Colorectal surgery following--recommended IV insulin infusion given glucose and A1C of 10 and endo consult  · Anticipate quick transition to basal/bolus regimen  · No surgical intervention presently     Type 2 diabetes mellitus without complication, without long-term current use of insulin [facility])  Assessment & Plan  Lab Results   Component Value Date    HGBA1C 10.9 (H) 09/26/2023       Recent Labs     09/25/23  1754 09/25/23  2104 09/26/23  0011 09/26/23  0531   POCGLU 313* 371* 377* 277*       Blood Sugar Average: Last 72 hrs:  (P) 334.5   · Patient with previous hemoglobin A1c 7.1 in July of 2022, now 10.9  · Blood glucose 300s upon admission.  Typically takes metformin and Amaryl as outpatient and reports complaince  · Was started on basal insulin and SSI upon discharge  · Received message from CRS asking for insulin gtt due to uncontrolled glucose and wound healing  · Will start low dose insulin gtt and consult endo for additional recs     Leukocytosis  Assessment & Plan  Presented with a leukocytosis secondary to perirectal abscess. · S/P I&D  · On IV abx above  · Improving  · Trend CBC       Class 2 severe obesity with body mass index (BMI) of 35 to 39.9 with serious comorbidity (HCC)  Assessment & Plan  · TLC as outpatient    Essential hypertension  Assessment & Plan  Documented history of hypertension however not on any antihypertensives as outpatient  · Monitor blood pressure while in the hospital, consider low dose ACEi if becomes uncontrolled              VTE Pharmacologic Prophylaxis: VTE Score: 4 Moderate Risk (Score 3-4) - Pharmacological DVT Prophylaxis Ordered: enoxaparin (Lovenox). Patient Centered Rounds: I performed bedside rounds with nursing staff today. Discussions with Specialists or Other Care Team Provider: d/w colorectal resident Ming Bains), appreciate endo    Education and Discussions with Family / Patient: Updated  (significant other) at bedside. Total Time Spent on Date of Encounter in care of patient: 34 mins. This time was spent on one or more of the following: performing physical exam; counseling and coordination of care; obtaining or reviewing history; documenting in the medical record; reviewing/ordering tests, medications or procedures; communicating with other healthcare professionals and discussing with patient's family/caregivers. Current Length of Stay: 1 day(s)  Current Patient Status: Inpatient   Certification Statement: The patient will continue to require additional inpatient hospital stay due to IV abx, f/u wound and blood cultures, ?further intervention for abscess, better glucose control   Discharge Plan: Anticipate discharge in 48-72 hrs to home with home services. Code Status: Level 1 - Full Code    Subjective:   Pt states he feels well today. He denies pain since I&D. We discussed glucose control, at which time he became slightly argumentative.   He states that he does not feel he requires any insulin upon discharge, but is agreeable to the insulin drip as mentioned by the colorectal surgeon. I did attempt to explained to patient that insulin drip would work short-term, however the key to reducing his A1c and risk for infection would be better long-term glucose control. His significant other spoke to me outside the room and states that she will try to encourage him to do whatever is recommended. Objective:     Vitals:   Temp (24hrs), Av.4 °F (37.4 °C), Min:97.7 °F (36.5 °C), Max:100.7 °F (38.2 °C)    Temp:  [97.7 °F (36.5 °C)-100.7 °F (38.2 °C)] 97.7 °F (36.5 °C)  HR:  [77-86] 77  Resp:  [16-19] 19  BP: (134-164)/(84-97) 156/89  SpO2:  [92 %-96 %] 96 %  Body mass index is 33.29 kg/m². Input and Output Summary (last 24 hours):   No intake or output data in the 24 hours ending 23 1124    Physical Exam:   Physical Exam  Vitals and nursing note reviewed. Constitutional:       Appearance: He is obese. Comments: On RA, dressed in regular clothing. Cardiovascular:      Rate and Rhythm: Normal rate and regular rhythm. Pulmonary:      Effort: No respiratory distress. Abdominal:      General: There is no distension. Musculoskeletal:      Right lower leg: No edema. Left lower leg: No edema. Neurological:      Mental Status: He is oriented to person, place, and time.    Psychiatric:      Comments: Dismissive          Additional Data:     Labs:  Results from last 7 days   Lab Units 23  0953   WBC Thousand/uL 12.43*   HEMOGLOBIN g/dL 15.4   HEMATOCRIT % 43.0   PLATELETS Thousands/uL 150   NEUTROS PCT % 82*   LYMPHS PCT % 8*   MONOS PCT % 8   EOS PCT % 1     Results from last 7 days   Lab Units 23  1043   SODIUM mmol/L 135   POTASSIUM mmol/L 4.4   CHLORIDE mmol/L 101   CO2 mmol/L 23   BUN mg/dL 13   CREATININE mg/dL 0.99   ANION GAP mmol/L 11   CALCIUM mg/dL 9.0   ALBUMIN g/dL 3.7   TOTAL BILIRUBIN mg/dL 1.35*   ALK PHOS U/L 86   ALT U/L 14   AST U/L 16   GLUCOSE RANDOM mg/dL 346*         Results from last 7 days   Lab Units 09/26/23  0531 09/26/23  0011 09/25/23  2104 09/25/23  1754   POC GLUCOSE mg/dl 277* 377* 371* 313*     Results from last 7 days   Lab Units 09/26/23  0532   HEMOGLOBIN A1C % 10.9*           Lines/Drains:  Invasive Devices     Peripheral Intravenous Line  Duration           Peripheral IV 09/25/23 Left Antecubital 1 day                      Imaging: Reviewed radiology reports from this admission including: abdominal/pelvic CT    Recent Cultures (last 7 days):   Results from last 7 days   Lab Units 09/25/23  1717   BLOOD CULTURE  Received in Microbiology Lab. Culture in Progress. GRAM STAIN RESULT  3+ Polys*  3+ Gram positive cocci in pairs, chains and clusters*  2+ Gram positive rods*  1+ Gram negative rods*       Last 24 Hours Medication List:   Current Facility-Administered Medications   Medication Dose Route Frequency Provider Last Rate   • acetaminophen  650 mg Oral Q6H PRN Yoel Van MD     • calcium carbonate  500 mg Oral Daily PRN Yoel Van MD     • cefazolin  2,000 mg Intravenous Q8H Hannah Siu MD 2,000 mg (09/26/23 5221)   • enoxaparin  40 mg Subcutaneous Daily Yoel Van MD     • insulin regular (HumuLIN R,NovoLIN R) 1 Units/mL in sodium chloride 0.9 % 100 mL infusion  0.3-21 Units/hr Intravenous Titrated Lorri Casanova PA-C     • lidocaine (PF)  30 mL Infiltration Once Yoel Van MD     • metroNIDAZOLE  500 mg Intravenous Q8H Hannah Siu  mg (09/26/23 3366)   • morphine injection  2 mg Intravenous Q4H PRN Yoel Van MD     • ondansetron  4 mg Intravenous Q4H PRN Yoel Van MD     • oxyCODONE  5 mg Oral Q4H PRN Yoel Van MD     • oxyCODONE  2.5 mg Oral Q4H PRN Yoel Van MD          Today, Patient Was Seen By: Lorri Casanova PA-C    **Please Note: This note may have been constructed using a voice recognition system. **

## 2023-09-26 NOTE — ASSESSMENT & PLAN NOTE
Documented history of hypertension however not on any antihypertensives as outpatient  · Monitor blood pressure while in the hospital, consider low dose ACEi if becomes uncontrolled

## 2023-09-26 NOTE — ASSESSMENT & PLAN NOTE
Patient scented to the hospital with left-sided perirectal abscess  · CT scan reviewed-  Extensive infiltrative changes in the perianal/perirectal region posteriorly.  Suggestion of an underlying poorly defined collection here measuring on the order of 3.4 by 2.3 cm may represent developing abscess  · Status post I&D of the perirectal abscess by colorectal surgery upon admission   · Follow-up on blood culture and also the wound culture  · Continue with cefazolin and Flagyl for now  · Colorectal surgery following--recommended IV insulin infusion given glucose and A1C of 10 and endo consult  · Anticipate quick transition to basal/bolus regimen  · No surgical intervention presently

## 2023-09-26 NOTE — CASE MANAGEMENT
Case Management Assessment & Discharge Planning Note    Patient name Leonila Charter  Location /-21 MRN [id number]  : 1955 Date 2023       Current Admission Date: 2023  Current Admission Diagnosis:Perirectal abscess   Patient Active Problem List    Diagnosis Date Noted   • Leukocytosis 2023   • Perirectal abscess 2023   • Acute bronchitis 2022   • Uncontrolled type 2 diabetes mellitus with hyperglycemia ([address]) 2022   • Epithelial inclusion cyst 2021   • Class 2 severe obesity with body mass index (BMI) of 35 to 39.9 with serious comorbidity ([address]) 2021   • Mixed hyperlipidemia 2020   • Chronic tension-type headache, not intractable 10/22/2018   • Type 2 diabetes mellitus without complication, without long-term current use of insulin ([address]) 10/22/2018   • Controlled type 2 diabetes mellitus without complication, without long-term current use of insulin ([address]) 10/22/2018   • Thrombocytopenia ([address]) 2015   • Vitamin D deficiency 2015   • Essential hypertension 2012      LOS (days): 1  Geometric Mean LOS (GMLOS) (days): 2.10  Days to GMLOS:1.3     OBJECTIVE:    Risk of Unplanned Readmission Score: 5.43         Current admission status: Inpatient       Preferred Pharmacy:   [facility] [address] WHIT HARRELL [address] [place] [address]  Phone: [phone] Fax: [phone], [facility] [place] [address]  Phone: [phone] Fax: [phone]    Primary Care Provider: Mitchell Moraes DO    Primary Insurance: MEDICARE  Secondary Insurance: [employer]    ASSESSMENT:  Sayra Proxies    There are no active Health Care Proxies on file.        Readmission Root Cause  30 Day Readmission: No    Patient Information  Admitted from[de-identified] Home  Mental Status: Alert  During Assessment patient was accompanied by: Spouse  Assessment information provided by[de-identified] Patient  Primary Caregiver: Self  Support Systems: Spouse/significant other  [place] of Residence: [address] do you live in?: [facility]  Type of Current Residence: 2 story home  Upon entering residence, is there a bedroom on the main floor (no further steps)?: Yes  Upon entering residence, is there a bathroom on the main floor (no further steps)?: Yes  In the last 12 months, was there a time when you were not able to pay the mortgage or rent on time?: No  In the last 12 months, how many places have you lived?: 1  In the last 12 months, was there a time when you did not have a steady place to sleep or slept in a shelter (including now)?: No  Homeless/housing insecurity resource given?: N/A  Living Arrangements: Lives w/ Spouse/significant other  Is patient a ?: No    Activities of Daily Living Prior to Admission  Functional Status: Independent  Completes ADLs independently?: Yes  Ambulates independently?: Yes  Does patient use assisted devices?: No  Does patient currently own DME?: No  Does patient have a history of Outpatient Therapy (PT/OT)?: No  Does the patient have a history of Short-Term Rehab?: No  Does patient have a history of HHC?: No  Does patient currently have [address]?: No    Patient Information Continued  Does patient have prescription coverage?: Yes  Within the past 12 months, you worried that your food would run out before you got the money to buy more.: Never true  Within the past 12 months, the food you bought just didn't last and you didn't have money to get more.: Never true  Food insecurity resource given?: N/A  Does patient receive dialysis treatments?: No  Does patient have a history of substance abuse?: No  Does patient have a history of Mental Health Diagnosis?: No    PHQ 2/9 Screening   Reviewed PHQ 2/9 Depression Screening Score?: No    Means of Transportation  Means of Transport to Appts[de-identified] Drives Self  In the past 12 months, has lack of transportation kept you from medical appointments or from getting medications?: No  In the past 12 months, has lack of transportation kept you from meetings, work, or from getting things needed for daily living?: No  Was application for public transport provided?: N/A        DISCHARGE DETAILS:    Discharge planning discussed with[de-identified] Patient  Freedom of Choice: Yes  Comments - Freedom of Choice: FARTUN YANG contacted family/caregiver?: Yes  Were Treatment Team discharge recommendations reviewed with patient/caregiver?: Yes  Did patient/caregiver verbalize understanding of patient care needs?: Yes  Were patient/caregiver advised of the risks associated with not following Treatment Team discharge recommendations?: Yes    [address]         Is the patient interested in [facility] at discharge?: Yes  [facility] requested[de-identified] [facility] Name[de-identified] [place] Provider[de-identified] PCP  Home Health Services Needed[de-identified] Post-Op Care and Assessment, Wound/Ostomy Care  Homebound Criteria Met[de-identified] Requires the Assistance of Another Person for Safe Ambulation or to Leave the Home  Supporting Clincal Findings[de-identified] Limited Endurance        Summary: Met with patient and patient's significant other at bedside to complete initial assessment. Demographics confirmed and updated on facesheet. Lives with SO. Independent with ADLs. Patient agreeable to home with [facility] to assist with dressing changes and teaching. SO states she will assist as well and also has friends who will assist with dressing changes. Referral sent to [facility]. SO will transport patient home once cleared for DC.

## 2023-09-26 NOTE — PROGRESS NOTES
-- Patient:  -- MRN: [id number]  -- Aidin Request ID: [id number]  -- Level of care reserved: Juan Mercado  -- [facility] [place],  [address] [phone]  -- Clinical needs requested:  -- Geography searched: [id number]  -- Start of Service:  -- Request sent: 11:08am EDT on 9/26/2023 by Glenda Lantigua  -- Partner reserved: 11:11am EDT on 9/26/2023 by Glenda Lantigua  -- Choice list shared: 11:11am EDT on 9/26/2023 by Conception Rhina

## 2023-09-26 NOTE — PROGRESS NOTES
Progress Note - Colorectal Surgery  : SWAP[facility] Resident on Any Vicky SRINIVASAN Reees 70 y.o. male MRN: [id number]  Unit/Bed#: MS [place]ter Encounter: [id number]      Assessment:  70 y.o. male POD 1 s/p bedside I&D of L perianal abscess, admitted to medicine for glycemic control    Patient did not tolerate bedside packing change, declined IV analgesics. After the packing change he requested preparation H or something similar to 'help heal'. He became belligerent and angry when told that this is not indicated for his condition and would not be of benefit. Plan:  Packing changes by patient's wife upon discharge  Sitz baths  Anticipate short course of antibiotics      Subjective: Feels well, pain well-controlled at baseline      Objective:     Physical Exam:  GEN: NAD   Ab: Soft, NT/ND, L perianal dressing SS, brigid-wound area soft  Lung: Normal effort  CV: RRR   Extrem: No CCE   Neuro: A+Ox3       I/O     None          Lab, Imaging and other studies: I have personally reviewed pertinent reports.   , CBC with diff:   Lab Results   Component Value Date    WBC 17.12 (H) 09/25/2023    HGB 15.5 09/25/2023    HCT 44.7 09/25/2023    MCV 91 09/25/2023     09/25/2023    RBC 4.93 09/25/2023    MCH 31.4 09/25/2023    MCHC 34.7 09/25/2023    RDW 11.9 09/25/2023    MPV 10.2 09/25/2023    NRBC 0 09/25/2023   , BMP/CMP:   Lab Results   Component Value Date    SODIUM 135 09/25/2023    K 4.4 09/25/2023     09/25/2023    CO2 23 09/25/2023    BUN 13 09/25/2023    CREATININE 0.99 09/25/2023    CALCIUM 9.0 09/25/2023    AST 16 09/25/2023    ALT 14 09/25/2023    ALKPHOS 86 09/25/2023    EGFR 77 09/25/2023         VTE Pharmacologic Prophylaxis: Enoxaparin (Lovenox)      Carol Rodriguez MD  9/25/2023 9:27 PM

## 2023-09-26 NOTE — QUICK NOTE
Surgery  Quick note    Pt seen and examined. Wound examined. Mild tenderness. Pain has much improved. Wound repacked with 1/2 plain packing. Will continue to monitor. Needs improvement in hyperglycemia. a1c 10.9  . Will continue to monitor.    Continue iv abx  Recommend insulin gtt    Sharmin Ruffin MD  9/26/23  8:54 AM

## 2023-09-26 NOTE — PROGRESS NOTES
-- Patient:  -- MRN: [id number]  -- Aidin Request ID: [id number]  -- Level of care reserved: [address]  -- Partner Reserved: [address] [facility], [address] [phone]  -- Clinical needs requested:  -- Geography searched: [phone]  -- Start of Service:  -- Request sent: 11:08am EDT on 9/26/2023 by Carmella Cherry  -- Partner reserved: 11:11am EDT on 9/26/2023 by Carmella Cherry  -- Choice list shared: 11:11am EDT on 9/26/2023 by aCrmella Cherry

## 2023-09-26 NOTE — ASSESSMENT & PLAN NOTE
Lab Results   Component Value Date    HGBA1C 10.9 (H) 09/26/2023       Recent Labs     09/25/23  1754 09/25/23  2104 09/26/23  0011 09/26/23  0531   POCGLU 313* 371* 377* 277*       Blood Sugar Average: Last 72 hrs:  (P) 334.5   · Patient with previous hemoglobin A1c 7.1 in July of 2022, now 10.9  · Blood glucose 300s upon admission. Typically takes metformin and Amaryl as outpatient and reports complaince  · Was started on basal insulin and SSI upon admission however glucose remains uncontrolled   · Received message from CRS asking for insulin gtt due to uncontrolled glucose and wound healing  · Will start low dose insulin gtt and consult endo for additional recs   · Patient adamantly refusing insulin at dc, risks discussed.  S/O will continue to encourage   · Per RN will need to move floors due to gtt, patient not currently agreeable

## 2023-09-26 NOTE — PLAN OF CARE
Problem: MOBILITY - ADULT  Goal: Maintain or return to baseline ADL function  Description: INTERVENTIONS:  -  Assess patient's ability to carry out ADLs; assess patient's baseline for ADL function and identify physical deficits which impact ability to perform ADLs (bathing, care of mouth/teeth, toileting, grooming, dressing, etc.)  - Assess/evaluate cause of self-care deficits   - Assess range of motion  - Assess patient's mobility; develop plan if impaired  - Assess patient's need for assistive devices and provide as appropriate  - Encourage maximum independence but intervene and supervise when necessary  - Involve family in performance of ADLs  - Assess for home care needs following discharge   - Consider OT consult to assist with ADL evaluation and planning for discharge  - Provide patient education as appropriate  9/26/2023 0524 by Kojo De La Garza RN  Outcome: Progressing  9/26/2023 0524 by Kojo De La Garza RN  Outcome: Progressing  Goal: Maintains/Returns to pre admission functional level  Description: INTERVENTIONS:  - Perform BMAT or MOVE assessment daily.   - Set and communicate daily mobility goal to care team and patient/family/caregiver. - Collaborate with rehabilitation services on mobility goals if consulted  - Perform Range of Motion   times a day. - Reposition patient every  hours.   - Dangle patient 4 times a day  - Stand patient 4 times a day  - Ambulate patient 3 times a day  - Out of bed to chair 3 times a day   - Out of bed for meals 3 times a day  - Out of bed for toileting  - Record patient progress and toleration of activity level   Outcome: Progressing     Problem: PAIN - ADULT  Goal: Verbalizes/displays adequate comfort level or baseline comfort level  Description: Interventions:  - Encourage patient to monitor pain and request assistance  - Assess pain using appropriate pain scale  - Administer analgesics based on type and severity of pain and evaluate response  - Implement non-pharmacological measures as appropriate and evaluate response  - Consider cultural and social influences on pain and pain management  - Notify physician/advanced practitioner if interventions unsuccessful or patient reports new pain  Outcome: Progressing     Problem: INFECTION - ADULT  Goal: Absence or prevention of progression during hospitalization  Description: INTERVENTIONS:  - Assess and monitor for signs and symptoms of infection  - Monitor lab/diagnostic results  - Monitor all insertion sites, i.e. indwelling lines, tubes, and drains  - Monitor endotracheal if appropriate and nasal secretions for changes in amount and color  - [place] appropriate cooling/warming therapies per order  - Administer medications as ordered  - Instruct and encourage patient and family to use good hand hygiene technique  - Identify and instruct in appropriate isolation precautions for identified infection/condition  Outcome: Progressing     Problem: SAFETY ADULT  Goal: Patient will remain free of falls  Description: INTERVENTIONS:  - Educate patient/family on patient safety including physical limitations  - Instruct patient to call for assistance with activity   - Consult OT/PT to assist with strengthening/mobility   - Keep Call bell within reach  - Keep bed low and locked with side rails adjusted as appropriate  - Keep care items and personal belongings within reach  - Initiate and maintain comfort rounds  - Make Fall Risk Sign visible to staff  - Offer Toileting every 2 Hours, in advance of need  - Initiate/Maintain bed /chair alarm  - Obtain necessary fall risk management equipment:   - Apply yellow socks and bracelet for high fall risk patients  - Consider moving patient to room near nurses station  Outcome: Progressing

## 2023-09-26 NOTE — CONSULTS
Endocrinology Consult Note  Fiona Brown 70 y.o. Male MRN: [id number]  Unit/Bed#: -01   Encounter: [id number]    CC: Type 2 diabetes mellitus with hyperglycemia     Assessment/Plan   Assessment:  Fiona Brown is a 77-year-old male with a past medical history significant for non-insulin-requiring type 2 diabetes mellitus and hypertension who is admitted for further management of his left-sided perirectal abscess s/p I&D. Plan:  1. Type 2 diabetes mellitus with hyperglycemia  - A1c 10.9% (9/26/23)  - Home regimen: Metformin  mg 1 tab daily with breakfast, glimepiride 2 mg 1 tab daily with breakfast     Recommendations:  - Patient has been started on an insulin drip for tight glycemic control with a goal blood glucose of 140-180 given his active infection  - Continue fingerstick blood glucose monitoring  - Avoid hypoglycemia and treat per protocol  - Once his glycemic control improves, can consider transitioning to subcutaneous insulin regimen  - Will recommend discharge regimen based on his clinical course   - Recommend outpatient endocrinology follow-up and will provide Dr. Abiel Pascal contact information  - Endocrinology will continue to follow and make further recommendations and changes as appropriate    History of Present Illness   HPI:   Fiona Brown is a 77-year-old male with a past medical history significant for non-insulin-requiring type 2 diabetes mellitus and hypertension who presented with a hard warm red bump on his left buttock since last Thursday and was found to have a left-sided perirectal abscess s/p I&D at bedside. Endocrinology is consulted for further management of his type 2 diabetes and hyperglycemia with insulin infusion. Patient reports he was diagnosed with type 2 diabetes mellitus 8-10 years ago when he had polydipsia and polyuria and was found to have an A1c of 10%. He was started on metformin and glimepiride and his A1c improved to almost 6.8% at that time. Patient is currently taking metformin  mg 1 tablet daily with breakfast and glimepiride 2 mg daily with breakfast which are prescribed by his PCP, Dr. Tha Ellison. His most recent A1c is 10.9% on 9/26/2023. He does check his blood glucose about twice a day and recalls his fasting glucose have normally been averaging around 150, but for the past week have been as high as 280-300. He also checks his blood glucose in the evening but cannot recall the range at this time. He does not check his postprandial blood sugar. He endorses polyuria since last Thursday but denies other symptoms including hypoglycemia symptoms. He reports that he tries to follow a carb control and portion control diet at home. His caregiver usually packs lunch for him. He has a family history of type 2 diabetes in his father. He denies tobacco and illicit drug use and endorses social alcohol use. Inpatient consult to Endocrinology  Consult performed by: Ayad Wallace DO  Consult ordered by: Zunilda Maynard PA-C        Review of Systems   Constitutional: Negative for chills and fever. HENT: Negative for ear pain and sore throat. Eyes: Negative for pain and visual disturbance. Respiratory: Negative for cough and shortness of breath. Cardiovascular: Negative for chest pain and palpitations. Gastrointestinal: Negative for abdominal pain and vomiting. Endocrine: Positive for polydipsia and polyuria. Negative for polyphagia. Genitourinary: Negative for dysuria and hematuria. Musculoskeletal: Negative for arthralgias and back pain. Skin: Negative for color change and rash. Neurological: Negative for seizures, syncope, weakness, light-headedness and numbness. All other systems reviewed and are negative.       Historical Information   Past Medical History:   Diagnosis Date   • Cyst of skin 04/29/2021    Behind right ear   • Diabetes mellitus ([address])    • Epithelial inclusion cyst 7/7/2021     Past Surgical History: Procedure Laterality Date   • CYST REMOVAL Right 04/29/2021    Excision of skin cyst from behind right ear.   Office procedure     Social History   Social History     Substance and Sexual Activity   Alcohol Use Yes   • Alcohol/week: 1.0 standard drink of alcohol   • Types: 1 Standard drinks or equivalent per week     Social History     Substance and Sexual Activity   Drug Use No     Social History     Tobacco Use   Smoking Status Never   Smokeless Tobacco Never     Family History:   Family History   Problem Relation Age of Onset   • Hypertension Father    • Colon cancer Neg Hx    • Colon polyps Neg Hx        Meds/Allergies   Current Facility-Administered Medications   Medication Dose Route Frequency Provider Last Rate Last Admin   • acetaminophen (TYLENOL) tablet 650 mg  650 mg Oral Q6H PRN Toney Tena MD       • calcium carbonate (TUMS) chewable tablet 500 mg  500 mg Oral Daily PRN Toney Tena MD       • ceFAZolin (ANCEF) IVPB (premix in dextrose) 2,000 mg 50 mL  2,000 mg Intravenous Q8H Marine Worrell  mL/hr at 09/26/23 0823 2,000 mg at 09/26/23 1615   • enoxaparin (LOVENOX) subcutaneous injection 40 mg  40 mg Subcutaneous Daily Toney Tena MD       • insulin regular (HumuLIN R,NovoLIN R) 1 Units/mL in sodium chloride 0.9 % 100 mL infusion  0.3-21 Units/hr Intravenous Titrated Kathy Chavez PA-C 3 mL/hr at 09/26/23 1414 3 Units/hr at 09/26/23 1414   • lidocaine (PF) (XYLOCAINE-MPF) 1 % injection 30 mL  30 mL Infiltration Once Toney Tena MD       • LORazepam (ATIVAN) tablet 0.5 mg  0.5 mg Oral Q8H PRN Kathy Chavez PA-C       • metroNIDAZOLE (FLAGYL) IVPB (premix) 500 mg 100 mL  500 mg Intravenous Q8H Marine Worrell  mL/hr at 09/26/23 0833 500 mg at 09/26/23 8726   • morphine injection 2 mg  2 mg Intravenous Q4H PRN Toney Tena MD       • ondansetron [facility]) injection 4 mg  4 mg Intravenous Q4H PRN Toney Tena MD       • oxyCODONE (ROXICODONE) IR tablet 5 mg  5 mg Oral Q4H PRN Malick Valdez Rhina Romero MD       • oxyCODONE (ROXICODONE) split tablet 2.5 mg  2.5 mg Oral Q4H PRN Arnulfo Quevedo MD         No Known Allergies    Objective   Vitals: Blood pressure 153/91, pulse 83, temperature 100 °F (37.8 °C), resp. rate 16, height 5' 10" (1.778 m), weight 105 kg (232 lb), SpO2 95 %. Intake/Output Summary (Last 24 hours) at 9/26/2023 1514  Last data filed at 9/26/2023 1217  Gross per 24 hour   Intake 120 ml   Output --   Net 120 ml     Invasive Devices     Peripheral Intravenous Line  Duration           Peripheral IV 09/25/23 Left Antecubital 1 day                Physical Exam  Vitals reviewed. Constitutional:       Appearance: Normal appearance. HENT:      Head: Normocephalic and atraumatic. Mouth/Throat:      Mouth: Mucous membranes are moist.      Pharynx: Oropharynx is clear. Eyes:      Extraocular Movements: Extraocular movements intact. Pupils: Pupils are equal, round, and reactive to light. Cardiovascular:      Rate and Rhythm: Normal rate and regular rhythm. Pulses: Normal pulses. Heart sounds: Normal heart sounds. Pulmonary:      Effort: Pulmonary effort is normal.      Breath sounds: Normal breath sounds. No wheezing, rhonchi or rales. Abdominal:      General: Abdomen is flat. Bowel sounds are normal. There is no distension. Tenderness: There is no abdominal tenderness. Musculoskeletal:         General: No deformity. Normal range of motion. Cervical back: Normal range of motion and neck supple. No tenderness. Right lower leg: No edema. Left lower leg: No edema. Neurological:      General: No focal deficit present. Mental Status: He is alert and oriented to person, place, and time. Psychiatric:         Mood and Affect: Mood normal.         Behavior: Behavior normal.       The history was obtained from the review of the chart, patient.     Lab Results:   Results from last 7 days   Lab Units 09/26/23  0532   HEMOGLOBIN A1C % 10.9*     Lab Results   Component Value Date    WBC 12.43 (H) 09/26/2023    HGB 15.4 09/26/2023    HCT 43.0 09/26/2023    MCV 90 09/26/2023     09/26/2023     Lab Results   Component Value Date/Time    BUN 13 09/25/2023 10:43 AM    BUN 23 10/22/2018 11:13 AM    K 4.4 09/25/2023 10:43 AM    K 5.1 10/22/2018 11:13 AM     09/25/2023 10:43 AM    CL 87 (L) 10/22/2018 11:13 AM    CO2 23 09/25/2023 10:43 AM    CO2 21 10/22/2018 11:13 AM    CREATININE 0.99 09/25/2023 10:43 AM    AST 16 09/25/2023 10:43 AM    AST 24 10/22/2018 11:13 AM    ALT 14 09/25/2023 10:43 AM    ALT 36 10/22/2018 11:13 AM    TP 7.3 09/25/2023 10:43 AM    TP 7.6 10/22/2018 11:13 AM    ALB 3.7 09/25/2023 10:43 AM    GLOB 3.2 10/22/2018 11:13 AM     No results for input(s): "CHOL", "HDL", "LDL", "TRIG", "VLDL" in the last 72 hours. No results found for: "Terryann Kuster", "[id number]"  POC Glucose (mg/dl)   Date Value   09/26/2023 232 (H)   09/26/2023 298 (H)       Imaging Studies: I have personally reviewed pertinent reports. Portions of the record may have been created with voice recognition software. Please Tigertext questions to the clinician covering the "[id number]-Bnds" Role. Thank you.

## 2023-09-27 PROBLEM — D72.829 LEUKOCYTOSIS: Status: RESOLVED | Noted: 2023-09-25 | Resolved: 2023-09-27

## 2023-09-27 LAB
ANION GAP SERPL CALCULATED.3IONS-SCNC: 6 MMOL/L
BASOPHILS # BLD AUTO: 0.04 THOUSANDS/ÂΜL (ref 0–0.1)
BASOPHILS NFR BLD AUTO: 1 % (ref 0–1)
BUN SERPL-MCNC: 13 MG/DL (ref 5–25)
CALCIUM SERPL-MCNC: 9.1 MG/DL (ref 8.4–10.2)
CHLORIDE SERPL-SCNC: 105 MMOL/L (ref 96–108)
CO2 SERPL-SCNC: 28 MMOL/L (ref 21–32)
CREAT SERPL-MCNC: 0.97 MG/DL (ref 0.6–1.3)
EOSINOPHIL # BLD AUTO: 0.23 THOUSAND/ÂΜL (ref 0–0.61)
EOSINOPHIL NFR BLD AUTO: 3 % (ref 0–6)
ERYTHROCYTE [DISTWIDTH] IN BLOOD BY AUTOMATED COUNT: 11.8 % (ref 11.6–15.1)
GFR SERPL CREATININE-BSD FRML MDRD: 79 ML/MIN/1.73SQ M
GLUCOSE SERPL-MCNC: 115 MG/DL (ref 65–140)
GLUCOSE SERPL-MCNC: 120 MG/DL (ref 65–140)
GLUCOSE SERPL-MCNC: 121 MG/DL (ref 65–140)
GLUCOSE SERPL-MCNC: 136 MG/DL (ref 65–140)
GLUCOSE SERPL-MCNC: 144 MG/DL (ref 65–140)
GLUCOSE SERPL-MCNC: 153 MG/DL (ref 65–140)
GLUCOSE SERPL-MCNC: 155 MG/DL (ref 65–140)
GLUCOSE SERPL-MCNC: 178 MG/DL (ref 65–140)
GLUCOSE SERPL-MCNC: 200 MG/DL (ref 65–140)
GLUCOSE SERPL-MCNC: 212 MG/DL (ref 65–140)
GLUCOSE SERPL-MCNC: 215 MG/DL (ref 65–140)
GLUCOSE SERPL-MCNC: 248 MG/DL (ref 65–140)
GLUCOSE SERPL-MCNC: 272 MG/DL (ref 65–140)
HCT VFR BLD AUTO: 46.1 % (ref 36.5–49.3)
HGB BLD-MCNC: 16 G/DL (ref 12–17)
IMM GRANULOCYTES # BLD AUTO: 0.05 THOUSAND/UL (ref 0–0.2)
IMM GRANULOCYTES NFR BLD AUTO: 1 % (ref 0–2)
LYMPHOCYTES # BLD AUTO: 1.39 THOUSANDS/ÂΜL (ref 0.6–4.47)
LYMPHOCYTES NFR BLD AUTO: 19 % (ref 14–44)
MCH RBC QN AUTO: 32 PG (ref 26.8–34.3)
MCHC RBC AUTO-ENTMCNC: 34.7 G/DL (ref 31.4–37.4)
MCV RBC AUTO: 92 FL (ref 82–98)
MONOCYTES # BLD AUTO: 0.94 THOUSAND/ÂΜL (ref 0.17–1.22)
MONOCYTES NFR BLD AUTO: 13 % (ref 4–12)
NEUTROPHILS # BLD AUTO: 4.61 THOUSANDS/ÂΜL (ref 1.85–7.62)
NEUTS SEG NFR BLD AUTO: 63 % (ref 43–75)
NRBC BLD AUTO-RTO: 0 /100 WBCS
PLATELET # BLD AUTO: 156 THOUSANDS/UL (ref 149–390)
PMV BLD AUTO: 9.5 FL (ref 8.9–12.7)
POTASSIUM SERPL-SCNC: 3.5 MMOL/L (ref 3.5–5.3)
RBC # BLD AUTO: 5 MILLION/UL (ref 3.88–5.62)
SODIUM SERPL-SCNC: 139 MMOL/L (ref 135–147)
WBC # BLD AUTO: 7.26 THOUSAND/UL (ref 4.31–10.16)

## 2023-09-27 PROCEDURE — 82948 REAGENT STRIP/BLOOD GLUCOSE: CPT

## 2023-09-27 PROCEDURE — 99233 SBSQ HOSP IP/OBS HIGH 50: CPT | Performed by: INTERNAL MEDICINE

## 2023-09-27 PROCEDURE — 87040 BLOOD CULTURE FOR BACTERIA: CPT | Performed by: INTERNAL MEDICINE

## 2023-09-27 PROCEDURE — 99223 1ST HOSP IP/OBS HIGH 75: CPT | Performed by: STUDENT IN AN ORGANIZED HEALTH CARE EDUCATION/TRAINING PROGRAM

## 2023-09-27 PROCEDURE — 85025 COMPLETE CBC W/AUTO DIFF WBC: CPT | Performed by: INTERNAL MEDICINE

## 2023-09-27 PROCEDURE — 80048 BASIC METABOLIC PNL TOTAL CA: CPT | Performed by: INTERNAL MEDICINE

## 2023-09-27 PROCEDURE — NC001 PR NO CHARGE: Performed by: COLON & RECTAL SURGERY

## 2023-09-27 RX ORDER — INSULIN LISPRO 100 [IU]/ML
12 INJECTION, SOLUTION INTRAVENOUS; SUBCUTANEOUS
Status: DISCONTINUED | OUTPATIENT
Start: 2023-09-28 | End: 2023-09-28 | Stop reason: HOSPADM

## 2023-09-27 RX ORDER — METRONIDAZOLE 500 MG/1
500 TABLET ORAL EVERY 8 HOURS SCHEDULED
Status: DISCONTINUED | OUTPATIENT
Start: 2023-09-27 | End: 2023-09-28 | Stop reason: HOSPADM

## 2023-09-27 RX ORDER — INSULIN LISPRO 100 [IU]/ML
2-12 INJECTION, SOLUTION INTRAVENOUS; SUBCUTANEOUS
Status: DISCONTINUED | OUTPATIENT
Start: 2023-09-28 | End: 2023-09-28 | Stop reason: HOSPADM

## 2023-09-27 RX ORDER — LISINOPRIL 10 MG/1
10 TABLET ORAL DAILY
Status: DISCONTINUED | OUTPATIENT
Start: 2023-09-27 | End: 2023-09-28 | Stop reason: HOSPADM

## 2023-09-27 RX ORDER — INSULIN GLARGINE 100 [IU]/ML
36 INJECTION, SOLUTION SUBCUTANEOUS
Status: DISCONTINUED | OUTPATIENT
Start: 2023-09-27 | End: 2023-09-28 | Stop reason: HOSPADM

## 2023-09-27 RX ADMIN — SODIUM CHLORIDE 3 UNITS/HR: 9 INJECTION, SOLUTION INTRAVENOUS at 12:52

## 2023-09-27 RX ADMIN — CEFTRIAXONE SODIUM 2000 MG: 10 INJECTION, POWDER, FOR SOLUTION INTRAVENOUS at 21:14

## 2023-09-27 RX ADMIN — CEFAZOLIN SODIUM 2000 MG: 2 SOLUTION INTRAVENOUS at 06:06

## 2023-09-27 RX ADMIN — METRONIDAZOLE 500 MG: 500 TABLET ORAL at 16:34

## 2023-09-27 RX ADMIN — LISINOPRIL 10 MG: 10 TABLET ORAL at 17:29

## 2023-09-27 RX ADMIN — CEFAZOLIN SODIUM 2000 MG: 2 SOLUTION INTRAVENOUS at 14:58

## 2023-09-27 RX ADMIN — INSULIN GLARGINE 36 UNITS: 100 INJECTION, SOLUTION SUBCUTANEOUS at 21:15

## 2023-09-27 RX ADMIN — METRONIDAZOLE 500 MG: 500 TABLET ORAL at 21:15

## 2023-09-27 RX ADMIN — METRONIDAZOLE 500 MG: 500 INJECTION, SOLUTION INTRAVENOUS at 08:25

## 2023-09-27 NOTE — PROGRESS NOTES
Endocrinology Progress Note  Leonila Sparks 70 y.o. Male MRN: [id number]  Unit/Bed#: @[facility](ept,31716)  Encounter: [id number]      CC: Type 2 diabetes f/u    Assessment & Plan    Assessment:  Leonila Sparks is a 34-year-old male with a past medical history significant for non-insulin-requiring type 2 diabetes mellitus and hypertension who is admitted for further management of his left-sided perirectal abscess s/p I&D. Plan:   1. Type 2 diabetes mellitus with hyperglycemia  - A1c 10.9% (9/26/23)  - Home regimen: Metformin  mg 1 tab daily with breakfast, glimepiride 2 mg 1 tab daily with breakfast      Recommendations:  - He will be transitioned to a subcutaneous insulin regiment today   - Initiate Lantus 36 units qhs and discontinue insulin infusion at midnight tonight   - Initiate Humalog 12u TID with meals starting 9/28/23 morning  - Initiate Humalog SSI starting 9/28/23 morning  - Continue fingerstick blood glucose monitoring  - Avoid hypoglycemia and treat per protocol  - Will recommend discharge regimen based on his clinical course   - Recommend outpatient endocrinology follow-up   - Patient will require insulin teaching prior to discharge   - Endocrinology will continue to follow and make further recommendations and changes as appropriate    Subjective  Leonila Sparks is a 34-year-old male with a past medical history significant for non-insulin-requiring type 2 diabetes mellitus and hypertension who presented with a hard warm red bump on his left buttock since last Thursday and was found to have a left-sided perirectal abscess s/p I&D at bedside. Fingerstick blood glucose trends reviewed. He reports a good appetite without acute complaints. He is inquiring about transition to a subcutaneous regimen and possible discharge home. I did assure him that if he is to be discharged home with insulin, he will receive teaching about administration prior to discharge.      Objective  Vitals:   Blood pressure (!) 159/112, pulse 71, temperature 97.8 °F (36.6 °C), resp. rate 14, height 5' 10" (1.778 m), weight 105 kg (232 lb), SpO2 96 %. ,Body mass index is 33.29 kg/m². Intake/Output Summary (Last 24 hours) at 9/27/2023 1426  Last data filed at 9/27/2023 0818  Gross per 24 hour   Intake 120 ml   Output --   Net 120 ml       Physical Exam:  General Appearance: awake, appears stated age and cooperative  Head: Normocephalic, without obvious abnormality, atraumatic  Extremities: Moves all extremities  Skin: Skin color and temperature normal.   Pulm: No labored breathing    Lab, Imaging and other studies: I have personally reviewed pertinent reports. POC Glucose (mg/dl)   Date Value   09/27/2023 200 (H)   09/27/2023 178 (H)   09/27/2023 272 (H)   09/27/2023 248 (H)   09/27/2023 136   09/27/2023 155 (H)   09/27/2023 144 (H)   09/27/2023 115   09/26/2023 146 (H)   09/26/2023 200 (H)     Please Tigertext questions to the clinician covering the "GRP[facility]" Role. Thank you.

## 2023-09-27 NOTE — PROGRESS NOTES
Progress Note - Colorectal Surgery  : SWAPNA [facility] Surgery Resident on Jaylyn Reese 70 y.o. male MRN: [id number]  Unit/Bed#: [facility] 813-01 Encounter: [id number]      Assessment:  70 y.o. male  s/p bedside I&D of L perianal abscess on 9/25; admitted to medicine for glycemic control: Insulin drip    Patient did not tolerate bedside packing change, declined IV analgesics. After the packing change he requested preparation H or something similar to 'help heal'. He became belligerent and angry when told that this is not indicated for his condition and would not be of benefit. Plan:  Packing removed today; no need for further packing  Sitz baths  Anticipate short course of antibiotics; transition to orals on discharge  Rest of care per primary      Subjective: Feels well, pain well-controlled at baseline. Concerned about being discharged      Objective:     Physical Exam:  GEN: NAD   Ab: Soft, NT/ND,  Perineum: L perianal dressing and packing with minimal seropurulent drainage, brigid-wound area soft with mild tenderness and improving erythema  Lung: Normal effort  CV: RRR   Extrem: No CCE   Neuro: A+Ox3       I/O     None          Lab, Imaging and other studies: I have personally reviewed pertinent reports.   , CBC with diff:   Lab Results   Component Value Date    WBC 7.26 09/27/2023    HGB 16.0 09/27/2023    HCT 46.1 09/27/2023    MCV 92 09/27/2023     09/27/2023    RBC 5.00 09/27/2023    MCH 32.0 09/27/2023    MCHC 34.7 09/27/2023    RDW 11.8 09/27/2023    MPV 9.5 09/27/2023    NRBC 0 09/27/2023   , BMP/CMP:   No results found for: "SODIUM", "K", "CL", "CO2", "ANIONGAP", "BUN", "CREATININE", "GLUCOSE", "CALCIUM", "AST", "ALT", "ALKPHOS", "PROT", "BILITOT", "EGFR"      VTE Pharmacologic Prophylaxis: Enoxaparin (Lovenox)      Morgan Valdes MD  9/27/2023 6:13 AM

## 2023-09-27 NOTE — PLAN OF CARE
Problem: MOBILITY - ADULT  Goal: Maintain or return to baseline ADL function  Description: INTERVENTIONS:  -  Assess patient's ability to carry out ADLs; assess patient's baseline for ADL function and identify physical deficits which impact ability to perform ADLs (bathing, care of mouth/teeth, toileting, grooming, dressing, etc.)  - Assess/evaluate cause of self-care deficits   - Assess range of motion  - Assess patient's mobility; develop plan if impaired  - Assess patient's need for assistive devices and provide as appropriate  - Encourage maximum independence but intervene and supervise when necessary  - Involve family in performance of ADLs  - Assess for home care needs following discharge   - Consider OT consult to assist with ADL evaluation and planning for discharge  - Provide patient education as appropriate  Outcome: Progressing  Goal: Maintains/Returns to pre admission functional level  Description: INTERVENTIONS:  - Perform BMAT or MOVE assessment daily.   - Set and communicate daily mobility goal to care team and patient/family/caregiver.    - Collaborate with rehabilitation services on mobility goals if consulted  - Out of bed for toileting  - Record patient progress and toleration of activity level   Outcome: Progressing     Problem: PAIN - ADULT  Goal: Verbalizes/displays adequate comfort level or baseline comfort level  Description: Interventions:  - Encourage patient to monitor pain and request assistance  - Assess pain using appropriate pain scale  - Administer analgesics based on type and severity of pain and evaluate response  - Implement non-pharmacological measures as appropriate and evaluate response  - Consider cultural and social influences on pain and pain management  - Notify physician/advanced practitioner if interventions unsuccessful or patient reports new pain  Outcome: Progressing     Problem: INFECTION - ADULT  Goal: Absence or prevention of progression during hospitalization  Description: INTERVENTIONS:  - Assess and monitor for signs and symptoms of infection  - Monitor lab/diagnostic results  - Monitor all insertion sites, i.e. indwelling lines, tubes, and drains  - Monitor endotracheal if appropriate and nasal secretions for changes in amount and color  - [place] appropriate cooling/warming therapies per order  - Administer medications as ordered  - Instruct and encourage patient and family to use good hand hygiene technique  - Identify and instruct in appropriate isolation precautions for identified infection/condition  Outcome: Progressing     Problem: SAFETY ADULT  Goal: Patient will remain free of falls  Description: INTERVENTIONS:  - Educate patient/family on patient safety including physical limitations  - Instruct patient to call for assistance with activity   - Consult OT/PT to assist with strengthening/mobility   - Keep Call bell within reach  - Keep bed low and locked with side rails adjusted as appropriate  - Keep care items and personal belongings within reach  - Initiate and maintain comfort rounds  - Make Fall Risk Sign visible to staff  - Apply yellow socks and bracelet for high fall risk patients  - Consider moving patient to room near nurses station  Outcome: Progressing     Problem: DISCHARGE PLANNING  Goal: Discharge to home or other facility with appropriate resources  Description: INTERVENTIONS:  - Identify barriers to discharge w/patient and caregiver  - Arrange for needed discharge resources and transportation as appropriate  - Identify discharge learning needs (meds, wound care, etc.)  - Arrange for interpretive services to assist at discharge as needed  - Refer to Case Management Department for coordinating discharge planning if the patient needs post-hospital services based on physician/advanced practitioner order or complex needs related to functional status, cognitive ability, or social support system  Outcome: Progressing     Problem: Knowledge Deficit  Goal: Patient/family/caregiver demonstrates understanding of disease process, treatment plan, medications, and discharge instructions  Description: Complete learning assessment and assess knowledge base.   Interventions:  - Provide teaching at level of understanding  - Provide teaching via preferred learning methods  Outcome: Progressing

## 2023-09-27 NOTE — CONSULTS
Consultation - Infectious Disease   Shefali Toth 70 y.o. male MRN: [id number]  Unit/Bed#: [facility] 813-01 Encounter: [id number]      IMPRESSION & RECOMMENDATIONS:     1. Perirectal abscess. Patient noticed mass of left buttock on 7/21, gradually grew in size with pain on palpation. Experienced fevers and chills, prompting him to seek treatment in the ED.   - CT showing underlying abscess 3.4x2.3cm in size. - Colorectal surgery following. Performed bedside incision and drainage, wound cultures taken. Subsequently packed open. Packing removed today. - Wound cultures showing growth of 3+ Polys, 3+ gram positive cocci in pairs, 2+ gram positive rods, and 1+ gram negative rods. - Continue IV ceftriaxone and PO Flagyl.   - Continue serial exams of wound. 2. Bacteremia. - Blood cultures taken from ED showing gram positive cocci in pairs and chains with Streptococcus detected. - Continue IV ceftriaxone and PO Flagyl.   - Follow up ECHO. - Follow up repeat blood cultures. 3. T2DM. Uncontrolled with most recent HbA1c of 10.9% (9/26). High risk of infection and non-healing.   - Endocrine following. Currently on insulin drip with plans to transition to subcutaneous insulin regimen. I have discussed the above management plan in detail with the primary service    I have performed an extensive review of the medical records in Epic including review of the notes, radiographs, and laboratory results     HISTORY OF PRESENT ILLNESS:  Reason for Consult: Perirectal abscess, bacteremia  HPI: Shefali Toth is a 70y.o. year old male with past medical history of hypertension and type 2 diabetes mellitus presenting with perirectal abscess. He noticed a mass to his left buttock on Thursday, which subsequently grew in size. He noted that it became red, hot, and swollen with pain on palpation. Experienced fever/chills and general fatigue over the weekend, prompting him to seek treatment at the ED on Monday.  He denies history of abscesses and wounds. He reports not being completely compliant with his diabetes medications as he forgot them in his truck last week. Since the incision and drainage of the abscess in the ED, he reports feeling better with no fatigue, fever or chills. Today he is AAO x 3, no acute distress. No acute events overnight. He denies CP, SOB, fever, chills. Reports that colorectal surgery performed a wound check earlier and removed packing. He denies any pain to the wound. REVIEW OF SYSTEMS:  A complete review of systems is negative other than that noted in the HPI. PAST MEDICAL HISTORY:  Past Medical History:   Diagnosis Date   • Cyst of skin 2021    Behind right ear   • Diabetes mellitus ([address])    • Epithelial inclusion cyst 2021     Past Surgical History:   Procedure Laterality Date   • CYST REMOVAL Right 2021    Excision of skin cyst from behind right ear. Office procedure       FAMILY HISTORY:  Non-contributory    SOCIAL HISTORY:  Social History   Social History     Substance and Sexual Activity   Alcohol Use Yes   • Alcohol/week: 1.0 standard drink of alcohol   • Types: 1 Standard drinks or equivalent per week     Social History     Substance and Sexual Activity   Drug Use No     Social History     Tobacco Use   Smoking Status Never   Smokeless Tobacco Never       ALLERGIES:  No Known Allergies    MEDICATIONS:  All current active medications have been reviewed.     PHYSICAL EXAM:  Temp:  [97.8 °F (36.6 °C)-100 °F (37.8 °C)] 98.3 °F (36.8 °C)  HR:  [71-83] 83  Resp:  [14-18] 18  BP: (136-159)/() 157/109  SpO2:  [89 %-96 %] 95 %  Temp (24hrs), Av.7 °F (37.1 °C), Min:97.8 °F (36.6 °C), Max:100 °F (37.8 °C)  Current: Temperature: 98.3 °F (36.8 °C)    Intake/Output Summary (Last 24 hours) at 2023 1546  Last data filed at 2023 0818  Gross per 24 hour   Intake 120 ml   Output --   Net 120 ml       General Appearance:  Appearing well, nontoxic, and in no distress Head:  Normocephalic, without obvious abnormality, atraumatic   Eyes:  Conjunctiva pink and sclera anicteric, both eyes   Nose: Nares normal, mucosa normal, no drainage   Throat: Oropharynx moist without lesions   Neck: Supple, symmetrical, no adenopathy, no tenderness/mass/nodules   Back:   Symmetric, no curvature, ROM normal, no CVA tenderness   Lungs:   Clear to auscultation bilaterally, respirations unlabored   Chest Wall:  No tenderness or deformity   Heart:  RRR; no murmur, rub or gallop   Abdomen:   Soft, non-tender, non-distended, positive bowel sounds    Extremities: No cyanosis, clubbing or edema   Skin: Full thickness wound along superior left buttock, draining yellow serous fluid, no purulence noted, no surrounding erythema noted. Lymph nodes: Cervical, supraclavicular nodes normal   Neurologic: Alert and oriented times 3, extremity strength 5/5 and symmetric       LABS, IMAGING, & OTHER STUDIES:  Lab Results:  I have personally reviewed pertinent labs. Results from last 7 days   Lab Units 09/27/23  0521 09/26/23  0953 09/25/23  1043   WBC Thousand/uL 7.26 12.43* 17.12*   HEMOGLOBIN g/dL 16.0 15.4 15.5   PLATELETS Thousands/uL 156 150 180     Results from last 7 days   Lab Units 09/27/23  0521 09/25/23  1043   SODIUM mmol/L 139 135   POTASSIUM mmol/L 3.5 4.4   CHLORIDE mmol/L 105 101   CO2 mmol/L 28 23   BUN mg/dL 13 13   CREATININE mg/dL 0.97 0.99   EGFR ml/min/1.73sq m 79 77   CALCIUM mg/dL 9.1 9.0   AST U/L  --  16   ALT U/L  --  14   ALK PHOS U/L  --  86     Results from last 7 days   Lab Units 09/25/23  1717   GRAM STAIN RESULT  3+ Polys*  3+ Gram positive cocci in pairs, chains and clusters*  2+ Gram positive rods*  1+ Gram negative rods*  Gram positive cocci in pairs and chains*   WOUND CULTURE  Few Colonies of                       Imaging Studies:   I have personally reviewed pertinent imaging study reports and images in PACS.       Other Studies:   I have personally reviewed pertinent reports.       Devorah Pearce, MARK  Podiatric Medicine and Surgery Residency, PGY-2

## 2023-09-27 NOTE — PLAN OF CARE
Problem: INFECTION - ADULT  Goal: Absence or prevention of progression during hospitalization  Description: INTERVENTIONS:  - Assess and monitor for signs and symptoms of infection  - Monitor lab/diagnostic results  - Monitor all insertion sites, i.e. indwelling lines, tubes, and drains  - Monitor endotracheal if appropriate and nasal secretions for changes in amount and color  - [place] appropriate cooling/warming therapies per order  - Administer medications as ordered  - Instruct and encourage patient and family to use good hand hygiene technique  - Identify and instruct in appropriate isolation precautions for identified infection/condition  Outcome: Progressing     Problem: DISCHARGE PLANNING  Goal: Discharge to home or other facility with appropriate resources  Description: INTERVENTIONS:  - Identify barriers to discharge w/patient and caregiver  - Arrange for needed discharge resources and transportation as appropriate  - Identify discharge learning needs (meds, wound care, etc.)  - Arrange for interpretive services to assist at discharge as needed  - Refer to Case Management Department for coordinating discharge planning if the patient needs post-hospital services based on physician/advanced practitioner order or complex needs related to functional status, cognitive ability, or social support system  Outcome: Progressing

## 2023-09-27 NOTE — ASSESSMENT & PLAN NOTE
Lab Results   Component Value Date    HGBA1C 10.9 (H) 09/26/2023       Recent Labs     09/27/23  0818 09/27/23  1003 09/27/23  1223 09/27/23  1405   POCGLU 248* 272* 178* 200*       Blood Sugar Average: Last 72 hrs:  (P) [id number]   · Patient with previous hemoglobin A1c 7.1 in July of 2022, now 10.9  · Blood glucose 300s upon admission.  Typically takes metformin and Amaryl as outpatient and reports complaince  · Placed on an insulin infusion and endocrinology consulted due to uncontrolled hyperglycemia  · Improved now  · Anticipate transition to SQ insulin soon

## 2023-09-27 NOTE — ASSESSMENT & PLAN NOTE
Patient scented to the hospital with left-sided perirectal abscess  · CT scan reviewed-  Extensive infiltrative changes in the perianal/perirectal region posteriorly.  Suggestion of an underlying poorly defined collection here measuring on the order of 3.4 by 2.3 cm may represent developing abscess  · Status post I&D of the perirectal abscess by colorectal surgery upon admission   · Blood culture is positive for strep, final identification is pending, wound culture positive for GPCs in pairs, chains and clusters, GNR and GPR  · Continue with cefazolin and Flagyl for now  · Repeat blood cultures  · Monitor temps  · Consult with ID regarding his bacteremia

## 2023-09-27 NOTE — PROGRESS NOTES
[facility] [facility]  Progress Note  Name: Mercedes Rivas  MRN: [id number]  Unit/Bed#: [facility] 859-19 I Date of Admission: 9/25/2023   Date of Service: 9/27/2023 I Hospital Day: 2    Assessment/Plan   Class 2 severe obesity with body mass index (BMI) of 35 to 39.9 with serious comorbidity ([address])  Assessment & Plan  · TLC as outpatient    Type 2 diabetes mellitus without complication, without long-term current use of insulin [facility])  Assessment & Plan  Lab Results   Component Value Date    HGBA1C 10.9 (H) 09/26/2023       Recent Labs     09/27/23  0818 09/27/23  1003 09/27/23  1223 09/27/23  1405   POCGLU 248* 272* 178* 200*       Blood Sugar Average: Last 72 hrs:  (P) 369.2198863190399936   · Patient with previous hemoglobin A1c 7.1 in July of 2022, now 10.9  · Blood glucose 300s upon admission. Typically takes metformin and Amaryl as outpatient and reports complaince  · Placed on an insulin infusion and endocrinology consulted due to uncontrolled hyperglycemia  · Improved now  · Anticipate transition to SQ insulin soon    Essential hypertension  Assessment & Plan  Documented history of hypertension however not on any antihypertensives as outpatient  · Monitor blood pressure while in the hospital, consider low dose ACEi if becomes uncontrolled     * Perirectal abscess  Assessment & Plan  Patient scented to the hospital with left-sided perirectal abscess  · CT scan reviewed-  Extensive infiltrative changes in the perianal/perirectal region posteriorly.  Suggestion of an underlying poorly defined collection here measuring on the order of 3.4 by 2.3 cm may represent developing abscess  · Status post I&D of the perirectal abscess by colorectal surgery upon admission   · Blood culture is positive for strep, final identification is pending, wound culture positive for GPCs in pairs, chains and clusters, GNR and GPR  · Continue with cefazolin and Flagyl for now  · Repeat blood cultures  · Monitor temps  · Consult with ID regarding his bacteremia               VTE Pharmacologic Prophylaxis: VTE Score: 4 Moderate Risk (Score 3-4) - Pharmacological DVT Prophylaxis Ordered: heparin. Patient Centered Rounds: I performed bedside rounds with nursing staff today. Discussions with Specialists or Other Care Team Provider: nurse, CM, ID, Endo    Education and Discussions with Family / Patient: Updated  (wife) at bedside. Total Time Spent on Date of Encounter in care of patient: 30 mins. This time was spent on one or more of the following: performing physical exam; counseling and coordination of care; obtaining or reviewing history; documenting in the medical record; reviewing/ordering tests, medications or procedures; communicating with other healthcare professionals and discussing with patient's family/caregivers. Current Length of Stay: 2 day(s)  Current Patient Status: Inpatient   Certification Statement: The patient will continue to require additional inpatient hospital stay due to bacteremia, insulin infusion  Discharge Plan: Anticipate discharge in 24-48 hrs to home. Code Status: Level 1 - Full Code    Subjective:   Reports that he is feeling much better today, denies any pain. Feels ready to go home. Denies any abdominal pain diarrhea or constipation. Objective:     Vitals:   Temp (24hrs), Av.1 °F (37.3 °C), Min:97.8 °F (36.6 °C), Max:100 °F (37.8 °C)    Temp:  [97.8 °F (36.6 °C)-100 °F (37.8 °C)] 97.8 °F (36.6 °C)  HR:  [71-83] 71  Resp:  [14-16] 14  BP: (136-159)/() 159/112  SpO2:  [89 %-96 %] 96 %  Body mass index is 33.29 kg/m². Input and Output Summary (last 24 hours): Intake/Output Summary (Last 24 hours) at 2023 1450  Last data filed at 2023 0818  Gross per 24 hour   Intake 120 ml   Output --   Net 120 ml       Physical Exam:   Physical Exam  Constitutional:       Appearance: Normal appearance. HENT:      Head: Normocephalic and atraumatic. Nose: Nose normal.   Eyes:      Extraocular Movements: Extraocular movements intact. Cardiovascular:      Rate and Rhythm: Normal rate and regular rhythm. Heart sounds: No murmur heard. Pulmonary:      Effort: Pulmonary effort is normal.      Breath sounds: No wheezing or rales. Musculoskeletal:         General: No deformity. Right lower leg: No edema. Left lower leg: No edema. Skin:     General: Skin is warm and dry. Neurological:      Mental Status: He is alert and oriented to person, place, and time. Psychiatric:         Mood and Affect: Mood normal.         Behavior: Behavior normal.          Additional Data:     Labs:  Results from last 7 days   Lab Units 09/27/23  0521   WBC Thousand/uL 7.26   HEMOGLOBIN g/dL 16.0   HEMATOCRIT % 46.1   PLATELETS Thousands/uL 156   NEUTROS PCT % 63   LYMPHS PCT % 19   MONOS PCT % 13*   EOS PCT % 3     Results from last 7 days   Lab Units 09/27/23  0521 09/25/23  1043   SODIUM mmol/L 139 135   POTASSIUM mmol/L 3.5 4.4   CHLORIDE mmol/L 105 101   CO2 mmol/L 28 23   BUN mg/dL 13 13   CREATININE mg/dL 0.97 0.99   ANION GAP mmol/L 6 11   CALCIUM mg/dL 9.1 9.0   ALBUMIN g/dL  --  3.7   TOTAL BILIRUBIN mg/dL  --  1.35*   ALK PHOS U/L  --  86   ALT U/L  --  14   AST U/L  --  16   GLUCOSE RANDOM mg/dL 121 346*         Results from last 7 days   Lab Units 09/27/23  1405 09/27/23  1223 09/27/23  1003 09/27/23  0818 09/27/23  0605 09/27/23  0356 09/27/23  0217 09/27/23  0028 09/26/23  2203 09/26/23  2020 09/26/23  1804 09/26/23  1612   POC GLUCOSE mg/dl 200* 178* 272* 248* 136 155* 144* 115 146* 200* 244* 179*     Results from last 7 days   Lab Units 09/26/23  0532   HEMOGLOBIN A1C % 10.9*           Lines/Drains:  Invasive Devices     Peripheral Intravenous Line  Duration           Peripheral IV 09/27/23 Right Antecubital <1 day                      Imaging: No pertinent imaging reviewed.     Recent Cultures (last 7 days):   Results from last 7 days   Lab Units 09/25/23  1717   GRAM STAIN RESULT  3+ Polys*  3+ Gram positive cocci in pairs, chains and clusters*  2+ Gram positive rods*  1+ Gram negative rods*  Gram positive cocci in pairs and chains*   WOUND CULTURE  Few Colonies of       Last 24 Hours Medication List:   Current Facility-Administered Medications   Medication Dose Route Frequency Provider Last Rate   • acetaminophen  650 mg Oral Q6H PRN Leena Lazcano MD     • calcium carbonate  500 mg Oral Daily PRN Leena Lazcano MD     • cefazolin  2,000 mg Intravenous Q8H Jaylene Gregg MD Stopped (09/27/23 0800)   • enoxaparin  40 mg Subcutaneous Daily Leena Lazcano MD     • insulin glargine  36 Units Subcutaneous HS Katelynn Jonas, DO     • [START ON 9/28/2023] insulin lispro  12 Units Subcutaneous Daily With Breakfast Katelynn Jonas, DO     • [START ON 9/28/2023] insulin lispro  12 Units Subcutaneous Daily With Lunch Katelynn Jonas, DO     • [START ON 9/28/2023] insulin lispro  12 Units Subcutaneous Daily With Dinner Katelynn Jonas, DO     • [START ON 9/28/2023] insulin lispro  2-12 Units Subcutaneous TID AC Katelynn Jonas, DO     • insulin regular (HumuLIN R,NovoLIN R) 1 Units/mL in sodium chloride 0.9 % 100 mL infusion  0.3-21 Units/hr Intravenous Titrated Katelynn Jonas, DO 3 Units/hr (09/27/23 1252)   • lidocaine (PF)  30 mL Infiltration Once Leena Lazcano MD     • LORazepam  0.5 mg Oral Q8H PRN Janeth Chapman PA-C     • metroNIDAZOLE  500 mg Intravenous Q8H Jaylene Gregg  mg (09/27/23 0825)   • morphine injection  2 mg Intravenous Q4H PRN Leena Lazcano MD     • ondansetron  4 mg Intravenous Q4H PRN Leena Lazcano MD     • oxyCODONE  5 mg Oral Q4H PRN Leena Lazcano MD     • oxyCODONE  2.5 mg Oral Q4H PRN Leena Lazcano MD          Today, Patient Was Seen By: Shira Solano MD    **Please Note: This note may have been constructed using a voice recognition system. **

## 2023-09-28 ENCOUNTER — APPOINTMENT (INPATIENT)
Dept: NON INVASIVE DIAGNOSTICS | Facility: HOSPITAL | Age: 68
End: 2023-09-28
Payer: MEDICARE

## 2023-09-28 VITALS
WEIGHT: 232 LBS | DIASTOLIC BLOOD PRESSURE: 90 MMHG | RESPIRATION RATE: 16 BRPM | HEART RATE: 75 BPM | OXYGEN SATURATION: 95 % | TEMPERATURE: 97.8 F | HEIGHT: 70 IN | BODY MASS INDEX: 33.21 KG/M2 | SYSTOLIC BLOOD PRESSURE: 137 MMHG

## 2023-09-28 PROBLEM — I77.810 AORTIC ROOT DILATION (HCC): Status: ACTIVE | Noted: 2023-09-28

## 2023-09-28 PROBLEM — A41.9 SEPSIS (HCC): Status: ACTIVE | Noted: 2023-09-28

## 2023-09-28 LAB
AORTIC ROOT: 4.3 CM
AORTIC VALVE MEAN VELOCITY: 8.7 M/S
APICAL FOUR CHAMBER EJECTION FRACTION: 45 %
ASCENDING AORTA: 4.8 CM
AV AREA BY CONTINUOUS VTI: 2.8 CM2
AV AREA PEAK VELOCITY: 2.9 CM2
AV LVOT MEAN GRADIENT: 2 MMHG
AV LVOT PEAK GRADIENT: 4 MMHG
AV MEAN GRADIENT: 3 MMHG
AV PEAK GRADIENT: 6 MMHG
AV VALVE AREA: 2.77 CM2
AV VELOCITY RATIO: 0.83
BACTERIA BLD CULT: ABNORMAL
BACTERIA SPEC ANAEROBE CULT: ABNORMAL
DOP CALC AO PEAK VEL: 1.22 M/S
DOP CALC AO VTI: 25.41 CM
DOP CALC LVOT AREA: 3.46 CM2
DOP CALC LVOT CARDIAC INDEX: 2.04 L/MIN/M2
DOP CALC LVOT CARDIAC OUTPUT: 4.52 L/MIN
DOP CALC LVOT DIAMETER: 2.1 CM
DOP CALC LVOT PEAK VEL VTI: 20.35 CM
DOP CALC LVOT PEAK VEL: 1.01 M/S
DOP CALC LVOT STROKE INDEX: 30.2 ML/M2
DOP CALC LVOT STROKE VOLUME: 70.45 CM3
E WAVE DECELERATION TIME: 233 MS
FRACTIONAL SHORTENING: 28 % (ref 28–44)
GLUCOSE SERPL-MCNC: 207 MG/DL (ref 65–140)
GLUCOSE SERPL-MCNC: 208 MG/DL (ref 65–140)
GRAM STN SPEC: ABNORMAL
INTERVENTRICULAR SEPTUM IN DIASTOLE (PARASTERNAL SHORT AXIS VIEW): 1.5 CM
INTERVENTRICULAR SEPTUM: 1.5 CM (ref 0.6–1.1)
LAAS-AP2: 23.2 CM2
LAAS-AP4: 21.5 CM2
LEFT ATRIUM SIZE: 3.4 CM
LEFT ATRIUM VOLUME (MOD BIPLANE): 69 ML
LEFT INTERNAL DIMENSION IN SYSTOLE: 3.3 CM (ref 2.1–4)
LEFT VENTRICLE DIASTOLIC VOLUME (MOD BIPLANE): 137 ML
LEFT VENTRICLE SYSTOLIC VOLUME (MOD BIPLANE): 68 ML
LEFT VENTRICULAR INTERNAL DIMENSION IN DIASTOLE: 4.6 CM (ref 3.5–6)
LEFT VENTRICULAR POSTERIOR WALL IN END DIASTOLE: 1.5 CM
LEFT VENTRICULAR STROKE VOLUME: 50 ML
LV EF: 50 %
LVSV (TEICH): 50 ML
MV E'TISSUE VEL-LAT: 9 CM/S
MV E'TISSUE VEL-SEP: 6 CM/S
MV PEAK A VEL: 0.78 M/S
MV PEAK E VEL: 63 CM/S
RIGHT ATRIUM AREA SYSTOLE A4C: 21.2 CM2
RIGHT VENTRICLE ID DIMENSION: 4.1 CM
SL CV LEFT ATRIUM LENGTH A2C: 5.6 CM
SL CV LV EF: 55
SL CV PED ECHO LEFT VENTRICLE DIASTOLIC VOLUME (MOD BIPLANE) 2D: 96 ML
SL CV PED ECHO LEFT VENTRICLE SYSTOLIC VOLUME (MOD BIPLANE) 2D: 45 ML
TRICUSPID ANNULAR PLANE SYSTOLIC EXCURSION: 2.6 CM

## 2023-09-28 PROCEDURE — 99239 HOSP IP/OBS DSCHRG MGMT >30: CPT | Performed by: INTERNAL MEDICINE

## 2023-09-28 PROCEDURE — 93306 TTE W/DOPPLER COMPLETE: CPT

## 2023-09-28 PROCEDURE — 82948 REAGENT STRIP/BLOOD GLUCOSE: CPT

## 2023-09-28 PROCEDURE — 99233 SBSQ HOSP IP/OBS HIGH 50: CPT | Performed by: STUDENT IN AN ORGANIZED HEALTH CARE EDUCATION/TRAINING PROGRAM

## 2023-09-28 PROCEDURE — 93306 TTE W/DOPPLER COMPLETE: CPT | Performed by: INTERNAL MEDICINE

## 2023-09-28 RX ORDER — PEN NEEDLE, DIABETIC 29 G X1/2"
NEEDLE, DISPOSABLE MISCELLANEOUS 4 TIMES DAILY
Qty: 100 EACH | Refills: 99 | Status: SHIPPED | OUTPATIENT
Start: 2023-09-28

## 2023-09-28 RX ORDER — AMOXICILLIN AND CLAVULANATE POTASSIUM 875; 125 MG/1; MG/1
1 TABLET, FILM COATED ORAL EVERY 12 HOURS SCHEDULED
Qty: 24 TABLET | Refills: 0 | Status: SHIPPED | OUTPATIENT
Start: 2023-09-29 | End: 2023-10-11

## 2023-09-28 RX ORDER — INSULIN ASPART INJECTION 100 [IU]/ML
16 INJECTION, SOLUTION SUBCUTANEOUS
Qty: 15 ML | Refills: 0 | Status: SHIPPED | OUTPATIENT
Start: 2023-09-28

## 2023-09-28 RX ORDER — LISINOPRIL 10 MG/1
10 TABLET ORAL DAILY
Qty: 30 TABLET | Refills: 0 | Status: SHIPPED | OUTPATIENT
Start: 2023-09-29

## 2023-09-28 RX ORDER — BLOOD PRESSURE TEST KIT
KIT MISCELLANEOUS 4 TIMES DAILY
Qty: 100 EACH | Refills: 99 | Status: SHIPPED | OUTPATIENT
Start: 2023-09-28

## 2023-09-28 RX ORDER — INSULIN DEGLUDEC INJECTION 100 U/ML
40 INJECTION, SOLUTION SUBCUTANEOUS
Qty: 15 ML | Refills: 0 | Status: SHIPPED | OUTPATIENT
Start: 2023-09-28

## 2023-09-28 RX ADMIN — ENOXAPARIN SODIUM 40 MG: 40 INJECTION SUBCUTANEOUS at 08:37

## 2023-09-28 RX ADMIN — INSULIN LISPRO 4 UNITS: 100 INJECTION, SOLUTION INTRAVENOUS; SUBCUTANEOUS at 08:37

## 2023-09-28 RX ADMIN — INSULIN LISPRO 12 UNITS: 100 INJECTION, SOLUTION INTRAVENOUS; SUBCUTANEOUS at 12:21

## 2023-09-28 RX ADMIN — LISINOPRIL 10 MG: 10 TABLET ORAL at 08:38

## 2023-09-28 RX ADMIN — INSULIN LISPRO 12 UNITS: 100 INJECTION, SOLUTION INTRAVENOUS; SUBCUTANEOUS at 08:36

## 2023-09-28 RX ADMIN — INSULIN LISPRO 4 UNITS: 100 INJECTION, SOLUTION INTRAVENOUS; SUBCUTANEOUS at 12:21

## 2023-09-28 RX ADMIN — METRONIDAZOLE 500 MG: 500 TABLET ORAL at 12:20

## 2023-09-28 RX ADMIN — METRONIDAZOLE 500 MG: 500 TABLET ORAL at 05:47

## 2023-09-28 NOTE — PROGRESS NOTES
Progress Note - Infectious Disease   Leo Abarca 70 y.o. male MRN: [id number]  [facility] Encounter: [id number]      Impression/Plan:  1. Sepsis POA with fever and leukocytosis. - Due to #1 and #2 below. 2. Perirectal abscess. Patient noticed mass of left buttock on 7/21, gradually grew in size with pain on palpation. Experienced fevers and chills, prompting him to seek treatment in the ED.   - CT showing underlying abscess 3.4x2.3cm in size. - Colorectal surgery following. Performed bedside incision and drainage in ED, wound cultures taken. Subsequently packed open. Packing removed yesterday. - Wound cultures showing 4+ growth of Streptococcus constellatus. Anaerobic cultures showing 3+ growth of Bacteriodes uniformis. - We recommend continued ceftriaxone and flagyl until repeat blood culture results at 48 hours. Unfortunately, as patient is persistent about going home today, he may transition to Augmentin. - Continue serial exams of wound.      3. Bacteremia. - Blood cultures taken from ED showing growth of Streptococcus constellatus  - Continue IV ceftriaxone and PO Flagyl.   - ECHO results reviewed. Negative for vegetations.   - Repeat blood cultures are in process.     4. T2DM. Uncontrolled with most recent HbA1c of 10.9% (9/26). High risk of infection and non-healing.   - Endocrine following. Currently on insulin drip with plans to transition to subcutaneous insulin regimen.      I have discussed the above management plan in detail with the primary service     I have performed an extensive review of the medical records in Epic including review of the notes, radiographs, and laboratory results     Antibiotics:  Ceftriaxone. Transition to Augmentin. Subjective:  Patient has no fever, chills, sweats; no nausea, vomiting, diarrhea; no cough, shortness of breath; no pain. No new symptoms.  Patient is very persistent about going home today despite our advice to stay for continued antibiotic treatment until blood culture results. He states that he understands the risks associated with going home but would still like to leave. Objective:  Vitals:  Temp:  [97.7 °F (36.5 °C)-98.3 °F (36.8 °C)] 97.8 °F (36.6 °C)  HR:  [62-83] 75  Resp:  [16-18] 16  BP: (137-157)/() 137/90  SpO2:  [93 %-96 %] 95 %  Temp (24hrs), Av.9 °F (36.6 °C), Min:97.7 °F (36.5 °C), Max:98.3 °F (36.8 °C)  Current: Temperature: 97.8 °F (36.6 °C)    Physical Exam:   General Appearance:  Alert, interactive, nontoxic, no acute distress. Throat: Oropharynx moist without lesions. Lungs:   Clear to auscultation bilaterally; no wheezes, rhonchi or rales; respirations unlabored   Heart:  RRR; no murmur, rub or gallop   Abdomen:   Soft, non-tender, non-distended, positive bowel sounds. Extremities: No clubbing, cyanosis or edema   Skin: No new rashes or lesions. Perirectal wound covered in dressings. Labs: All pertinent labs and imaging studies were personally reviewed  Results from last 7 days   Lab Units 23  0521 23  0953 23  1043   WBC Thousand/uL 7.26 12.43* 17.12*   HEMOGLOBIN g/dL 16.0 15.4 15.5   PLATELETS Thousands/uL 156 150 180     Results from last 7 days   Lab Units 23  0521 23  1043   SODIUM mmol/L 139 135   POTASSIUM mmol/L 3.5 4.4   CHLORIDE mmol/L 105 101   CO2 mmol/L 28 23   BUN mg/dL 13 13   CREATININE mg/dL 0.97 0.99   EGFR ml/min/1.73sq m 79 77   CALCIUM mg/dL 9.1 9.0   AST U/L  --  16   ALT U/L  --  14   ALK PHOS U/L  --  86                       Micro:  Results from last 7 days   Lab Units 23  1400 23  1717   BLOOD CULTURE  Received in Microbiology Lab. Culture in Progress. Received in Microbiology Lab. Culture in Progress.  Streptococcus constellatus*   GRAM STAIN RESULT   --  Gram positive cocci in pairs and chains*  3+ Polys*  3+ Gram positive cocci in pairs, chains and clusters*  2+ Gram positive rods*  1+ Gram negative rods*   WOUND CULTURE   --  4+ Growth of Streptococcus constellatus*  Few Colonies of       Imaging:  Relevant labs and microbiology data personally reviewed.         Tena Alfonso DPM  Podiatric Medicine and Surgery Residency, PGY-2

## 2023-09-28 NOTE — ASSESSMENT & PLAN NOTE
· Lisinopril started for BP control  · Outpatient CT scan in 6 months  · PT counseled to follow up with his PCP for this

## 2023-09-28 NOTE — DISCHARGE SUMMARY
[facility] [facility]  Discharge- Alecia Dance 1955, 70 y.o. male MRN: [id number]  Unit/Bed#: [facility] 813-01 Encounter: [id number]  Primary Care Provider: Batsheva Pascal DO   Date and time admitted to hospital: 9/25/2023  9:11 AM    Aortic root dilation (HCC)  Assessment & Plan  · Lisinopril started for BP control  · Outpatient CT scan in 6 months  · PT counseled to follow up with his PCP for this    Sepsis [facility])  Assessment & Plan  · Due to perirectal abscess and strep bacteremia  · Afebrile now  · Repeat blood cultures are pending  · Pt to sign out AMA  · He is aware that he should return back to the ED if cultures become positive    Class 2 severe obesity with body mass index (BMI) of 35 to 39.9 with serious comorbidity ([address])  Assessment & Plan  · TLC as outpatient    Type 2 diabetes mellitus without complication, without long-term current use of insulin [facility])  Assessment & Plan  Lab Results   Component Value Date    HGBA1C 10.9 (H) 09/26/2023       Recent Labs     09/27/23 1959 09/27/23  2203 09/28/23  0758 09/28/23  1119   POCGLU 215* 153* 208* 207*       Blood Sugar Average: Last 72 hrs:  (P) [id number]   · Patient with previous hemoglobin A1c 7.1 in July of 2022, now 10.9  · Transition to SQ insulin on discharge  · Follow up with endocrinology as an outpatient    Essential hypertension  Assessment & Plan  Documented history of hypertension however not on any antihypertensives as outpatient  · Monitor blood pressure while in the hospital, consider low dose ACEi if becomes uncontrolled     * Perirectal abscess  Assessment & Plan  Patient scented to the hospital with left-sided perirectal abscess  · CT scan reviewed-  Extensive infiltrative changes in the perianal/perirectal region posteriorly.  Suggestion of an underlying poorly defined collection here measuring on the order of 3.4 by 2.3 cm may represent developing abscess  · Status post I&D of the perirectal abscess by colorectal surgery upon admission   · Blood culture is positive for strep constelatus  · Repeat blood cultures are pending  · Monitor temps  · Repeat cultures are pending  · PT is afebrile now  · Pt to sign himself out AMA today  · D/w ID will change antibiotic regimen to augmentin 875 BID for 12 more days        Medical Problems     Resolved Problems  Date Reviewed: 9/26/2023          Resolved    Leukocytosis 9/27/2023     Resolved by  Lakisha Salguero MD        Discharging Physician / Practitioner: Hudson Bethea MD  PCP: Alan Buckner DO  Admission Date:   Admission Orders (From admission, onward)     Ordered        09/25/23 [address] [facility]  Once                      Discharge Date: 09/28/23    Consultations During Hospital Stay:  · Colorectal surgery  · Endocrinology  · Infectious disease    Procedures Performed:   · I and D of perirectal abscess    Significant Findings / Test Results:   Extensive infiltrative changes in the perianal/perirectal region posteriorly. Suggestion of an underlying poorly defined collection here measuring on the order of 3.4 by 2.3 cm may represent developing abscess    Incidental Findings:   aortic root is mildly dilated at 4.3 cm/2.0 cm/m2. The ascending aorta is moderately dilated at 4.8 cm/2.2 cm/m2  9 mm left lower pole calculus. Mild nodularity of the left adrenal gland but appears stable. Slight thickening of the right adrenal gland. Scattered calcific/hyperdense material at the bilateral lung bases question calcifications versus previously aspirated barium material. There is a stable 5 mm pleural-based nodular density in the left lower lobe laterally   · I reviewed the above mentioned incidental findings with the patient and/or family and they expressed understanding. Test Results Pending at Discharge (will require follow up):    · Blood cultures x2     Outpatient Tests Requested:  · CT chest in 6 months    Complications:  None    Reason for Admission: sepsis    Hospital Course:   Leonila Sparks is a 70 y.o. male patient who originally presented to the hospital on 9/25/2023 due to sepsis and perirectal abscess. He was admitted and started on antibiotic therapy and seen by colorectal surgery. He had an incision and drainage performed and cultures were sent. Blood cultures were positive for strep constellatus. Wound culture positive for strep and bacteroides. ID was consulted and cultures repeated. Prior to the results of the repeat cultures the patient signed out against medical advice. He was discharged home with 12 more days of antibiotic therapy to complete a 14 day course. He was counseled on the potential risks of leaving prior to confirming blood cultures have cleared. He signed an AMA form. During his course he was also noted to have uncontrolled DM. He briefly required an insulin infusion and was converted to SQ insulin on discharge. Please see above list of diagnoses and related plan for additional information. Condition at Discharge: stable    Discharge Day Visit / Exam:   Subjective:  Denies any complaints. Vitals: Blood Pressure: 137/90 (09/28/23 1015)  Pulse: 75 (09/28/23 1015)  Temperature: 97.8 °F (36.6 °C) (09/28/23 0750)  Temp Source: Oral (09/25/23 1736)  Respirations: 16 (09/28/23 0750)  Height: 5' 10" (177.8 cm) (09/28/23 1015)  Weight - Scale: 105 kg (232 lb) (09/28/23 1015)  SpO2: 95 % (09/28/23 0750)  Exam:   Physical Exam  Constitutional:       Appearance: Normal appearance. HENT:      Head: Normocephalic and atraumatic. Nose: Nose normal.   Eyes:      Extraocular Movements: Extraocular movements intact. Cardiovascular:      Rate and Rhythm: Normal rate and regular rhythm. Pulmonary:      Effort: Pulmonary effort is normal.   Neurological:      Mental Status: He is alert and oriented to person, place, and time.    Psychiatric:         Mood and Affect: Mood normal.         Behavior: Behavior normal. Discussion with Family: Updated  (wife) at bedside. Discharge instructions/Information to patient and family:   See after visit summary for information provided to patient and family. Provisions for Follow-Up Care:  See after visit summary for information related to follow-up care and any pertinent home health orders. Disposition:   Home    Planned Readmission: No     Discharge Statement:  I spent 45 minutes discharging the patient. This time was spent on the day of discharge. I had direct contact with the patient on the day of discharge. Greater than 50% of the total time was spent examining patient, answering all patient questions, arranging and discussing plan of care with patient as well as directly providing post-discharge instructions. Additional time then spent on discharge activities. Discharge Medications:  See after visit summary for reconciled discharge medications provided to patient and/or family.       **Please Note: This note may have been constructed using a voice recognition system**

## 2023-09-28 NOTE — ASSESSMENT & PLAN NOTE
Patient scented to the hospital with left-sided perirectal abscess  · CT scan reviewed-  Extensive infiltrative changes in the perianal/perirectal region posteriorly.  Suggestion of an underlying poorly defined collection here measuring on the order of 3.4 by 2.3 cm may represent developing abscess  · Status post I&D of the perirectal abscess by colorectal surgery upon admission   · Blood culture is positive for strep constelatus  · Repeat blood cultures are pending  · Monitor temps  · Repeat cultures are pending  · PT is afebrile now  · Pt to sign himself out AMA today  · D/w ID will change antibiotic regimen to augmentin 875 BID for 12 more days

## 2023-09-28 NOTE — CASE MANAGEMENT
Case Management Discharge Planning Note    Patient name Mike Caro  Location [address] [address]/[facility] [id number] MRN [id number]  : 1955 Date 2023       Current Admission Date: 2023  Current Admission Diagnosis:Perirectal abscess   Patient Active Problem List    Diagnosis Date Noted   • Perirectal abscess 2023   • Acute bronchitis 2022   • Uncontrolled type 2 diabetes mellitus with hyperglycemia ([address]) 2022   • Epithelial inclusion cyst 2021   • Class 2 severe obesity with body mass index (BMI) of 35 to 39.9 with serious comorbidity ([address]) 2021   • Mixed hyperlipidemia 2020   • Chronic tension-type headache, not intractable 10/22/2018   • Type 2 diabetes mellitus without complication, without long-term current use of insulin ([address]) 10/22/2018   • Controlled type 2 diabetes mellitus without complication, without long-term current use of insulin ([address]) 10/22/2018   • Thrombocytopenia ([address]) 2015   • Vitamin D deficiency 2015   • Essential hypertension 2012      LOS (days): 3  Geometric Mean LOS (GMLOS) (days): 2.10  Days to GMLOS:-0.6     OBJECTIVE:  Risk of Unplanned Readmission Score: 5.75         Current admission status: Inpatient   Preferred Pharmacy:   [address] [place] [address]  Phone: [phone] Fax: [phone] - [place], [address] [place] [address]  Phone: [phone] Fax: [phone]    Primary Care Provider: Gerardo Lozoya DO    Primary Insurance: MEDICARE  Secondary Insurance: [employer]    DISCHARGE DETAILS:                                [address]         Is the patient interested in [facility] at discharge?: No                   Treatment Team Recommendation: Home  Discharge Destination Plan[de-identified] Home  Transport at Discharge : Family                             IMM Given (Date):: 23  IMM Given to[de-identified] Patient  Family notified[de-identified] wife at bedside  Additional Comments: Patient expected to be cleared for discharge today. No discharge needs identified, referral for [facility] withdrawn. Wife at bedside, will arrange for d/c transportation. 27.4

## 2023-09-28 NOTE — QUICK NOTE
GENERAL SURGERY QUICK NOTE - WOUND CHECK    Patient was seen and evaluated bedside. He had minimal complaints and his pain is resolved. The dressing over his incision and drainage site was taken down; the erythema is resolving and there was no visible drainage. Continues to have mild tenderness although improved. The dressing was replaced; folded 4 x 4 gauze with tape.     Vitals:    09/28/23 1015   BP: 137/90   Pulse: 75   Resp:    Temp:    SpO2:

## 2023-09-28 NOTE — PLAN OF CARE
Problem: MOBILITY - ADULT  Goal: Maintain or return to baseline ADL function  Description: INTERVENTIONS:  -  Assess patient's ability to carry out ADLs; assess patient's baseline for ADL function and identify physical deficits which impact ability to perform ADLs (bathing, care of mouth/teeth, toileting, grooming, dressing, etc.)  - Assess/evaluate cause of self-care deficits   - Assess range of motion  - Assess patient's mobility; develop plan if impaired  - Assess patient's need for assistive devices and provide as appropriate  - Encourage maximum independence but intervene and supervise when necessary  - Involve family in performance of ADLs  - Assess for home care needs following discharge   - Consider OT consult to assist with ADL evaluation and planning for discharge  - Provide patient education as appropriate  Outcome: Progressing  Goal: Maintains/Returns to pre admission functional level  Description: INTERVENTIONS:  - Perform BMAT or MOVE assessment daily.   - Set and communicate daily mobility goal to care team and patient/family/caregiver.    - Collaborate with rehabilitation services on mobility goals if consulted  Problem: PAIN - ADULT  Goal: Verbalizes/displays adequate comfort level or baseline comfort level  Description: Interventions:  - Encourage patient to monitor pain and request assistance  - Assess pain using appropriate pain scale  - Administer analgesics based on type and severity of pain and evaluate response  - Implement non-pharmacological measures as appropriate and evaluate response  - Consider cultural and social influences on pain and pain management  - Notify physician/advanced practitioner if interventions unsuccessful or patient reports new pain  Outcome: Progressing     Problem: INFECTION - ADULT  Goal: Absence or prevention of progression during hospitalization  Description: INTERVENTIONS:  - Assess and monitor for signs and symptoms of infection  - Monitor lab/diagnostic results  - Monitor all insertion sites, i.e. indwelling lines, tubes, and drains  - Monitor endotracheal if appropriate and nasal secretions for changes in amount and color  - [place] appropriate cooling/warming therapies per order  - Administer medications as ordered  - Instruct and encourage patient and family to use good hand hygiene technique  - Identify and instruct in appropriate isolation precautions for identified infection/condition  Outcome: Progressing     Problem: SAFETY ADULT  Goal: Patient will remain free of falls  Description: INTERVENTIONS:  - Educate patient/family on patient safety including physical limitations  - Instruct patient to call for assistance with activity   - Consult OT/PT to assist with strengthening/mobility   - Keep Call bell within reach  - Keep bed low and locked with side rails adjusted as appropriate  - Keep care items and personal belongings within reach  - Initiate and maintain comfort rounds  - Make Fall Risk Sign visible to staff  Problem: DISCHARGE PLANNING  Goal: Discharge to home or other facility with appropriate resources  Description: INTERVENTIONS:  - Identify barriers to discharge w/patient and caregiver  - Arrange for needed discharge resources and transportation as appropriate  - Identify discharge learning needs (meds, wound care, etc.)  - Arrange for interpretive services to assist at discharge as needed  - Refer to Case Management Department for coordinating discharge planning if the patient needs post-hospital services based on physician/advanced practitioner order or complex needs related to functional status, cognitive ability, or social support system  Outcome: Progressing     Problem: Knowledge Deficit  Goal: Patient/family/caregiver demonstrates understanding of disease process, treatment plan, medications, and discharge instructions  Description: Complete learning assessment and assess knowledge base.   Interventions:  - Provide teaching at level of understanding  - Provide teaching via preferred learning methods  Outcome: Progressing     - Apply yellow socks and bracelet for high fall risk patients  - Consider moving patient to room near nurses station  Outcome: Progressing     - Out of bed for toileting  - Record patient progress and toleration of activity level   Outcome: Progressing

## 2023-09-28 NOTE — QUICK NOTE
Endocrinology Quick Note  Alda Feldman 70 y.o. Male MRN: [id number]  Unit/Bed: [facility] 813/[facility] 813-01  Encounter: [id number]      Informed by patient's hospitalist that patient wants to leave AMA today. Discharge recommendations as follows:   - Continue Metformin 500 mg daily  - Discontinue glimepiride 2 mg daily   - Initiate Toujeo 40 units daily (formulary preferred)  - Initiate Fiasp 16 units at least 5-15 minutes before each meal (formulary preferred)  - Please provide patient with diabetic supplies including pen needles and testing supplies if needed  - Recommend insulin administration teaching prior to discharge   - Continue checking blood glucose at home TID and bring a log sheet to his outpatient visit  - Patient recommended to follow-up with Dr. Alannah Fofana in the outpatient setting     Please Tigertext questions to the clinician covering the "[id number]" Role. Thank you.

## 2023-09-28 NOTE — ASSESSMENT & PLAN NOTE
Lab Results   Component Value Date    HGBA1C 10.9 (H) 09/26/2023       Recent Labs     09/27/23  1959 09/27/23 2203 09/28/23  0758 09/28/23  1119   POCGLU 215* 153* 208* 207*       Blood Sugar Average: Last 72 hrs:  (P) [id number]   · Patient with previous hemoglobin A1c 7.1 in July of 2022, now 10.9  · Transition to SQ insulin on discharge  · Follow up with endocrinology as an outpatient

## 2023-09-28 NOTE — INCIDENTAL FINDINGS
The following findings require follow up:  Radiographic finding   Finding: ascending aorta and aortic root enlargement   Follow up required: ct scan of chest   Follow up should be done within 6 month(s)    Please notify the following clinician to assist with the follow up:   Dr. Brendan Rudolph

## 2023-09-28 NOTE — ASSESSMENT & PLAN NOTE
· Due to perirectal abscess and strep bacteremia  · Afebrile now  · Repeat blood cultures are pending  · Pt to sign out AMA  · He is aware that he should return back to the ED if cultures become positive

## 2023-09-29 ENCOUNTER — TRANSITIONAL CARE MANAGEMENT (OUTPATIENT)
Dept: FAMILY MEDICINE CLINIC | Facility: CLINIC | Age: 68
End: 2023-09-29

## 2023-09-29 LAB
BACTERIA WND AEROBE CULT: ABNORMAL
BACTERIA WND AEROBE CULT: ABNORMAL
GRAM STN SPEC: ABNORMAL

## 2023-10-01 LAB
BACTERIA BLD CULT: NORMAL
BACTERIA BLD CULT: NORMAL

## 2023-10-02 LAB
BACTERIA BLD CULT: ABNORMAL
BACTERIA BLD CULT: NORMAL
BACTERIA BLD CULT: NORMAL
GRAM STN SPEC: ABNORMAL
STREPTOCOCCUS DNA BLD POS NAA+NON-PROBE: DETECTED

## 2023-10-04 ENCOUNTER — RA CDI HCC (OUTPATIENT)
Dept: OTHER | Facility: HOSPITAL | Age: 68
End: 2023-10-04

## 2023-10-04 NOTE — PROGRESS NOTES
[facility] [address] [facility] coding opportunities          Chart Reviewed number of suggestions sent to Provider: 3  E11.40  E11.65  E11.22     Patients Insurance     Medicare Insurance: [employer]

## 2023-10-11 ENCOUNTER — OFFICE VISIT (OUTPATIENT)
Dept: FAMILY MEDICINE CLINIC | Facility: CLINIC | Age: 68
End: 2023-10-11
Payer: MEDICARE

## 2023-10-11 VITALS
HEIGHT: 70 IN | TEMPERATURE: 96.1 F | OXYGEN SATURATION: 98 % | HEART RATE: 79 BPM | BODY MASS INDEX: 33.79 KG/M2 | WEIGHT: 236 LBS

## 2023-10-11 DIAGNOSIS — E11.65 UNCONTROLLED TYPE 2 DIABETES MELLITUS WITH HYPERGLYCEMIA (HCC): ICD-10-CM

## 2023-10-11 DIAGNOSIS — I10 ESSENTIAL HYPERTENSION: ICD-10-CM

## 2023-10-11 DIAGNOSIS — D69.6 THROMBOCYTOPENIA (HCC): ICD-10-CM

## 2023-10-11 DIAGNOSIS — N52.9 ERECTILE DYSFUNCTION, UNSPECIFIED ERECTILE DYSFUNCTION TYPE: ICD-10-CM

## 2023-10-11 DIAGNOSIS — K61.1 PERIRECTAL ABSCESS: Primary | ICD-10-CM

## 2023-10-11 PROCEDURE — 99496 TRANSJ CARE MGMT HIGH F2F 7D: CPT | Performed by: FAMILY MEDICINE

## 2023-10-11 RX ORDER — SILDENAFIL 50 MG/1
100 TABLET, FILM COATED ORAL AS NEEDED
Qty: 30 TABLET | Refills: 5 | Status: SHIPPED | OUTPATIENT
Start: 2023-10-11

## 2023-10-11 NOTE — PROGRESS NOTES
Depression Screening and Follow-up Plan: Patient was screened for depression during today's encounter. They screened negative with a PHQ-2 score of 0. Subjective:   Chief Complaint   Patient presents with    Transition of Care Management     Foot exam completed normal, /102        Patient ID: Keila Warren is a 70 y.o. male. Here for tcm after hospitalization for perirectal abscess      The following portions of the patient's history were reviewed and updated as appropriate: allergies, current medications, past family history, past medical history, past social history, past surgical history and problem list.    Review of Systems   Constitutional:  Negative for activity change, appetite change, chills, diaphoresis, fatigue and unexpected weight change. HENT:  Negative for congestion, ear discharge, ear pain, hearing loss, nosebleeds and rhinorrhea. Eyes:  Negative for pain, redness, itching and visual disturbance. Respiratory:  Negative for cough, choking, chest tightness and shortness of breath. Cardiovascular:  Negative for chest pain and leg swelling. Gastrointestinal:  Negative for abdominal pain, blood in stool, constipation, diarrhea and nausea. Endocrine: Negative for cold intolerance, polydipsia and polyphagia. Genitourinary:  Negative for dysuria, frequency, hematuria and urgency. Musculoskeletal:  Negative for arthralgias, back pain, gait problem, joint swelling, neck pain and neck stiffness. Skin:  Positive for wound. Negative for color change and rash. Allergic/Immunologic: Negative for environmental allergies and food allergies. Neurological:  Positive for weakness. Negative for dizziness, tremors, seizures, speech difficulty, numbness and headaches. Hematological:  Negative for adenopathy. Does not bruise/bleed easily. Psychiatric/Behavioral:  Negative for behavioral problems, dysphoric mood, hallucinations and self-injury.               Objective:  Vitals: 10/11/23 0833   Pulse: 79   Temp: (!) 96.1 °F (35.6 °C)   TempSrc: Tympanic   SpO2: 98%   Weight: 107 kg (236 lb)   Height: 5' 10" (1.778 m)      Physical Exam  Constitutional:       General: He is not in acute distress. Appearance: He is well-developed. He is not diaphoretic. HENT:      Head: Normocephalic and atraumatic. Right Ear: External ear normal.      Left Ear: External ear normal.      Nose: Nose normal.      Mouth/Throat:      Pharynx: No oropharyngeal exudate. Eyes:      General: No scleral icterus. Right eye: No discharge. Left eye: No discharge. Conjunctiva/sclera: Conjunctivae normal.      Pupils: Pupils are equal, round, and reactive to light. Neck:      Thyroid: No thyromegaly. Cardiovascular:      Rate and Rhythm: Normal rate and regular rhythm. Pulses: no weak pulses          Dorsalis pedis pulses are 1+ on the right side and 1+ on the left side. Posterior tibial pulses are 1+ on the right side and 1+ on the left side. Heart sounds: Normal heart sounds. No murmur heard. Pulmonary:      Effort: Pulmonary effort is normal.      Breath sounds: Normal breath sounds. No wheezing or rales. Abdominal:      General: Bowel sounds are normal.      Palpations: Abdomen is soft. There is no mass. Tenderness: There is no abdominal tenderness. There is no guarding. Musculoskeletal:         General: No tenderness. Normal range of motion. Cervical back: Normal range of motion and neck supple. Feet:      Right foot:      Skin integrity: No ulcer, skin breakdown, erythema, warmth, callus or dry skin. Left foot:      Skin integrity: No ulcer, skin breakdown, erythema, warmth, callus or dry skin. Lymphadenopathy:      Cervical: No cervical adenopathy. Skin:     General: Skin is warm and dry. Neurological:      Mental Status: He is alert and oriented to person, place, and time. Deep Tendon Reflexes: Reflexes are normal and symmetric. Psychiatric:         Thought Content: Thought content normal.         Judgment: Judgment normal.       Patient's shoes and socks removed. Right Foot/Ankle   Right Foot Inspection  Skin Exam: skin normal and skin intact. No dry skin, no warmth, no callus, no erythema, no maceration, no abnormal color, no pre-ulcer, no ulcer and no callus. Toe Exam: ROM and strength within normal limits. Sensory   Vibration: intact  Proprioception: intact  Monofilament testing: intact    Vascular  Capillary refills: < 3 seconds  The right DP pulse is 1+. The right PT pulse is 1+. Left Foot/Ankle  Left Foot Inspection  Skin Exam: skin normal and skin intact. No dry skin, no warmth, no erythema, no maceration, normal color, no pre-ulcer, no ulcer and no callus. Toe Exam: ROM and strength within normal limits. Sensory   Vibration: intact  Proprioception: intact  Monofilament testing: intact    Vascular  Capillary refills: < 3 seconds  The left DP pulse is 1+. The left PT pulse is 1+. Assign Risk Category  No deformity present  No loss of protective sensation  No weak pulses  Risk: 0   Assessment/Plan:    No problem-specific Assessment & Plan notes found for this encounter. Diagnoses and all orders for this visit:    Perirectal abscess    Uncontrolled type 2 diabetes mellitus with hyperglycemia ([address])    Essential hypertension    Erectile dysfunction, unspecified erectile dysfunction type  -     sildenafil (VIAGRA) 50 MG tablet; Take 2 tablets (100 mg total) by mouth as needed for erectile dysfunction    Thrombocytopenia (HCC)    BMI 34.0-34.9,adult          BMI Counseling: Body mass index is 33.86 kg/m². The BMI is above normal. Nutrition recommendations include moderation in carbohydrate intake. Exercise recommendations include exercising 3-5 times per week.

## 2023-10-11 NOTE — PATIENT INSTRUCTIONS
Obesity   AMBULATORY CARE:   Obesity  means your body mass index (BMI) is greater than 30. Your healthcare provider will use your age, height, and weight to measure your BMI. The risks of obesity include  many health problems, including injuries or physical disability. Diabetes (high blood sugar level)    High blood pressure or high cholesterol    Heart disease or heart failure    Stroke    Gallbladder or liver disease    Cancer of the colon, breast, prostate, liver, or kidney    Sleep apnea    Arthritis or gout    Screening  is done to check for health conditions before you have signs or symptoms. If you are 28to 79years old, your blood sugar level may be checked every 3 years for signs of prediabetes or diabetes. Your healthcare provider will check your blood pressure at each visit. High blood pressure can lead to a stroke or other problems. Your provider may check for signs of heart disease, cancer, or other health problems. Seek care immediately if:   You have a severe headache, confusion, or difficulty speaking. You have weakness on one side of your body. You have chest pain, sweating, or shortness of breath. Call your doctor if:   You have symptoms of gallbladder or liver disease, such as pain in your upper abdomen. You have knee or hip pain and discomfort while walking. You have symptoms of diabetes, such as intense hunger and thirst, and frequent urination. You have symptoms of sleep apnea, such as snoring or daytime sleepiness. You have questions or concerns about your condition or care. Treatment for obesity  focuses on helping you lose weight to improve your health. Even a small decrease in BMI can reduce the risk for many health problems. Your healthcare provider will help you set a weight-loss goal.  Lifestyle changes  are the first step in treating obesity. These include making healthy food choices and getting regular physical activity.  Your healthcare provider may suggest a weight-loss program that involves coaching, education, and therapy. Medicine  may help you lose weight when it is used with a healthy foods and physical activity. Surgery  can help you lose weight if you have obesity along with other health problems. Several types of weight-loss surgery are available. Ask your healthcare provider for more information. Tips for safe weight loss:   Set small, realistic goals. An example of a small goal is to walk for 20 minutes 5 days a week. Anther goal is to lose 5% of your body weight. Ask for support. Tell friends, family members, and coworkers about your goals. Ask someone to lose weight with you. You may also want to join a weight-loss support group. Identify foods or triggers that may cause you to overeat. Remove tempting high-calorie foods from your home and workplace. Place a bowl of fresh fruit on your kitchen counter. If stress causes you to eat, find other ways to cope with stress. A counselor or therapist may be able to help you. Track your daily calories and activity. Write down what you eat and drink. Also write down how many minutes of physical activity you do each day. Track your weekly weight. Weigh yourself in the morning, before you eat or drink anything but after you use the bathroom. Use the same scale, in the same place, and in similar clothing each time. Only weigh yourself 1 to 2 times each week, or as directed. You may become discouraged if you weigh yourself every day. Eating changes: You will need to eat 500 to 1,000 fewer calories each day than you currently eat to lose 1 to 2 pounds a week. The following changes will help you cut calories:  Eat smaller portions. Use small plates, no larger than 9 inches in diameter. Fill your plate half full of fruits and vegetables. Measure your food using measuring cups until you know what a serving size looks like. Eat 3 meals and 1 or 2 snacks each day.   Plan your meals in advance. Connie Martinez and eat at home most of the time. Eat slowly. Do not skip meals. Skipping meals can lead to overeating later in the day. This can make it harder for you to lose weight. Talk with a dietitian to help you make a meal plan and schedule that is right for you. Eat fruits and vegetables at every meal.  They are low in calories and high in fiber, which makes you feel full. Do not add butter, margarine, or cream sauce to vegetables. Use herbs to season steamed vegetables. Eat less fat and fewer fried foods. Eat more baked or grilled chicken and fish. These protein sources are lower in calories and fat than red meat. Limit fast food. Dress your salads with olive oil and vinegar instead of bottled dressing. Limit the amount of sugar you eat. Do not drink sugary beverages. Limit alcohol. Activity changes:  Physical activity is good for your body in many ways. It helps you burn calories and build strong muscles. It decreases stress and depression, and improves your mood. It can also help you sleep better. Talk to your healthcare provider before you begin an exercise program.  Exercise for at least 30 minutes 5 days a week. Start slowly. Set aside time each day for physical activity that you enjoy and that is convenient for you. It is best to do both weight training and an activity that increases your heart rate, such as walking, bicycling, or swimming. Find ways to be more active. Do yard work and housecleaning. Walk up the stairs instead of using elevators. Spend your leisure time going to events that require walking, such as outdoor festivals or fairs. This extra physical activity can help you lose weight and keep it off. Follow up with your doctor as directed: You may need to meet with a dietitian. Write down your questions so you remember to ask them during your visits.   © Copyright [facility] 2023 Information is for End User's use only and may not be sold, redistributed or otherwise used for commercial purposes. The above information is an  only. It is not intended as medical advice for individual conditions or treatments. Talk to your doctor, nurse or pharmacist before following any medical regimen to see if it is safe and effective for you.

## 2023-10-18 ENCOUNTER — OFFICE VISIT (OUTPATIENT)
Dept: FAMILY MEDICINE CLINIC | Facility: CLINIC | Age: 68
End: 2023-10-18

## 2023-10-18 VITALS
OXYGEN SATURATION: 96 % | WEIGHT: 238 LBS | TEMPERATURE: 98.3 F | HEART RATE: 89 BPM | DIASTOLIC BLOOD PRESSURE: 84 MMHG | SYSTOLIC BLOOD PRESSURE: 134 MMHG | BODY MASS INDEX: 34.07 KG/M2 | HEIGHT: 70 IN

## 2023-10-18 DIAGNOSIS — E11.65 UNCONTROLLED TYPE 2 DIABETES MELLITUS WITH HYPERGLYCEMIA (HCC): ICD-10-CM

## 2023-10-18 DIAGNOSIS — Z00.00 MEDICARE ANNUAL WELLNESS VISIT, SUBSEQUENT: Primary | ICD-10-CM

## 2023-10-18 DIAGNOSIS — M54.50 ACUTE LEFT-SIDED LOW BACK PAIN WITHOUT SCIATICA: ICD-10-CM

## 2023-10-18 LAB
BACTERIA UR QL AUTO: NORMAL /HPF
BILIRUB UR QL STRIP: NEGATIVE
CLARITY UR: CLEAR
COLOR UR: YELLOW
CREAT UR-MCNC: 129.5 MG/DL
GLUCOSE UR STRIP-MCNC: NEGATIVE MG/DL
HGB UR QL STRIP.AUTO: NEGATIVE
KETONES UR STRIP-MCNC: NEGATIVE MG/DL
LEUKOCYTE ESTERASE UR QL STRIP: NEGATIVE
MICROALBUMIN UR-MCNC: 14.7 MG/L
MICROALBUMIN/CREAT 24H UR: 11 MG/G CREATININE (ref 0–30)
NITRITE UR QL STRIP: NEGATIVE
NON-SQ EPI CELLS URNS QL MICRO: NORMAL /HPF
PH UR STRIP.AUTO: 6.5 [PH]
PROT UR STRIP-MCNC: ABNORMAL MG/DL
RBC #/AREA URNS AUTO: NORMAL /HPF
SL AMB  POCT GLUCOSE, UA: NORMAL
SL AMB LEUKOCYTE ESTERASE,UA: NORMAL
SL AMB POCT BILIRUBIN,UA: NORMAL
SL AMB POCT BLOOD,UA: NORMAL
SL AMB POCT CLARITY,UA: NORMAL
SL AMB POCT COLOR,UA: NORMAL
SL AMB POCT KETONES,UA: NORMAL
SL AMB POCT NITRITE,UA: NORMAL
SL AMB POCT PH,UA: 6.5
SL AMB POCT SPECIFIC GRAVITY,UA: 1.02
SL AMB POCT URINE PROTEIN: NORMAL
SL AMB POCT UROBILINOGEN: 0.2
SP GR UR STRIP.AUTO: 1.02 (ref 1–1.03)
UROBILINOGEN UR STRIP-ACNC: <2 MG/DL
WBC #/AREA URNS AUTO: NORMAL /HPF

## 2023-10-18 PROCEDURE — 81001 URINALYSIS AUTO W/SCOPE: CPT | Performed by: FAMILY MEDICINE

## 2023-10-18 PROCEDURE — 82043 UR ALBUMIN QUANTITATIVE: CPT | Performed by: FAMILY MEDICINE

## 2023-10-18 PROCEDURE — 82570 ASSAY OF URINE CREATININE: CPT | Performed by: FAMILY MEDICINE

## 2023-10-18 RX ORDER — BLOOD-GLUCOSE METER
KIT MISCELLANEOUS
Qty: 200 EACH | Refills: 2 | Status: SHIPPED | OUTPATIENT
Start: 2023-10-18 | End: 2023-10-18

## 2023-10-18 RX ORDER — BLOOD-GLUCOSE METER
KIT MISCELLANEOUS 2 TIMES DAILY
Qty: 1 EACH | Refills: 0 | Status: SHIPPED | OUTPATIENT
Start: 2023-10-18

## 2023-10-18 RX ORDER — BLOOD-GLUCOSE METER
KIT MISCELLANEOUS
Qty: 200 STRIP | Refills: 2 | Status: SHIPPED | OUTPATIENT
Start: 2023-10-18

## 2023-10-18 NOTE — PROGRESS NOTES
Assessment and Plan:     Problem List Items Addressed This Visit    None      Depression Screening and Follow-up Plan: Patient was screened for depression during today's encounter. They screened negative with a PHQ-2 score of 0. Preventive health issues were discussed with patient, and age appropriate screening tests were ordered as noted in patient's After Visit Summary. Personalized health advice and appropriate referrals for health education or preventive services given if needed, as noted in patient's After Visit Summary. History of Present Illness:     Patient presents for a Medicare Wellness Visit    Patient is here for adult wellness visit. Also is complaining of left-sided low back pain. This has been going on for several weeks. He is concerned since he had a recent hospitalization for an abscess and wanted to make sure he did not have a urinary infection. Also needs pills on his glucometer and glucometer test strips. He is also due for diabetic evaluation of kidney function       Patient Care Team:  Alan Buckner DO as PCP - General     Review of Systems:     Review of Systems   Constitutional:  Negative for activity change, appetite change, chills, diaphoresis, fatigue and unexpected weight change. HENT:  Negative for congestion, ear discharge, ear pain, hearing loss, nosebleeds and rhinorrhea. Eyes:  Negative for pain, redness, itching and visual disturbance. Respiratory:  Negative for cough, choking, chest tightness and shortness of breath. Cardiovascular:  Negative for chest pain and leg swelling. Gastrointestinal:  Negative for abdominal pain, blood in stool, constipation, diarrhea and nausea. Endocrine: Negative for cold intolerance, polydipsia and polyphagia. Genitourinary:  Negative for dysuria, frequency, hematuria and urgency. Musculoskeletal:  Positive for back pain. Negative for arthralgias, gait problem, joint swelling, neck pain and neck stiffness.    Skin: Negative for color change and rash. Allergic/Immunologic: Negative for environmental allergies and food allergies. Neurological:  Negative for dizziness, tremors, seizures, speech difficulty, numbness and headaches. Hematological:  Negative for adenopathy. Does not bruise/bleed easily. Psychiatric/Behavioral:  Negative for behavioral problems, dysphoric mood, hallucinations and self-injury. Problem List:     Patient Active Problem List   Diagnosis    Essential hypertension    Thrombocytopenia (HCC)    Vitamin D deficiency    Chronic tension-type headache, not intractable    Type 2 diabetes mellitus without complication, without long-term current use of insulin (HCC)    Controlled type 2 diabetes mellitus without complication, without long-term current use of insulin (HCC)    Mixed hyperlipidemia    Epithelial inclusion cyst    Class 2 severe obesity with body mass index (BMI) of 35 to 39.9 with serious comorbidity ([facility])    Uncontrolled type 2 diabetes mellitus with hyperglycemia ([facility])    Acute bronchitis    Perirectal abscess    Sepsis ([address] )    Aortic root dilation [facility])      Past Medical and Surgical History:     Past Medical History:   Diagnosis Date    Cyst of skin 04/29/2021    Behind right ear    Diabetes mellitus ([address] [facility])     Epithelial inclusion cyst 7/7/2021     Past Surgical History:   Procedure Laterality Date    CYST REMOVAL Right 04/29/2021    Excision of skin cyst from behind right ear.   Office procedure      Family History:     Family History   Problem Relation Age of Onset    Hypertension Father     Colon cancer Neg Hx     Colon polyps Neg Hx       Social History:     Social History     Socioeconomic History    Marital status: Single     Spouse name: None    Number of children: None    Years of education: None    Highest education level: None   Occupational History    None   Tobacco Use    Smoking status: Never    Smokeless tobacco: Never   Vaping Use    Vaping Use: Never used   Substance and Sexual Activity    Alcohol use: Yes     Alcohol/week: 1.0 standard drink of alcohol     Types: 1 Standard drinks or equivalent per week    Drug use: No    Sexual activity: None   Other Topics Concern    None   Social History Narrative    None     Social Determinants of Health     Financial Resource Strain: Not on file   Food Insecurity: No Food Insecurity (9/26/2023)    Hunger Vital Sign     Worried About Running Out of Food in the Last Year: Never true     Ran Out of Food in the Last Year: Never true   Transportation Needs: No Transportation Needs (9/26/2023)    PRAPARE - Transportation     Lack of Transportation (Medical): No     Lack of Transportation (Non-Medical):  No   Physical Activity: Not on file   Stress: Not on file   Social Connections: Not on file   Intimate Partner Violence: Not on file   Housing Stability: Low Risk  (9/26/2023)    Housing Stability Vital Sign     Unable to Pay for Housing in the Last Year: No     Number of Places Lived in the Last Year: 1     Unstable Housing in the Last Year: No      Medications and Allergies:     Current Outpatient Medications   Medication Sig Dispense Refill    Alcohol Swabs PADS Use 4 (four) times a day 100 each 99    Blood Glucose Monitoring Suppl (FreeStyle Lite) TAMMY by Percutaneous route 2 (two) times a day 1 each 0    glucose blood (FREESTYLE LITE) test strip TEST AS DIRECTED 200 each 2    glucose monitoring kit (FREESTYLE) monitoring kit 1 each by Does not apply route 2 (two) times a day before lunch and dinner E11.65 1 each 0    insulin aspart, w/niacinamide, (Fiasp FlexTouch) 100 Units/mL injection pen Inject 16 Units under the skin 3 (three) times a day with meals 5-15 min before eating 15 mL 0    insulin degludec Nicole Pacini FlexTouch) 100 units/mL injection pen Inject 40 Units under the skin daily at bedtime 15 mL 0    Insulin Pen Needle (PEN NEEDLES 29GX1/2") 29G X 12MM MISC Use 4 (four) times a day 100 each 99    Lancets (freestyle) lancets Use as instructed E11.65 testing twice a day 100 each 0    lisinopril (ZESTRIL) 10 mg tablet Take 1 tablet (10 mg total) by mouth daily Do not start before September 29, 2023. 30 tablet 0    metFORMIN (GLUCOPHAGE-XR) 500 mg 24 hr tablet TAKE 1 TABLET BY MOUTH EVERY DAY WITH DINNER 90 tablet 1    sildenafil (VIAGRA) 50 MG tablet Take 2 tablets (100 mg total) by mouth as needed for erectile dysfunction 30 tablet 5     No current facility-administered medications for this visit. No Known Allergies   Immunizations:     Immunization History   Administered Date(s) Administered    Tdap 08/17/2015      Health Maintenance:         Topic Date Due    Colorectal Cancer Screening  04/27/2026    Hepatitis C Screening  Completed         Topic Date Due    COVID-19 Vaccine (1) Never done    Pneumococcal Vaccine: 65+ Years (1 - PCV) Never done    Influenza Vaccine (1) Never done      Medicare Screening Tests and Risk Assessments:     Rosa Saleh is here for his Subsequent Wellness visit. Last Medicare Wellness visit information reviewed, patient interviewed and updates made to the record today. Health Risk Assessment:   Patient rates overall health as excellent. Patient feels that their physical health rating is same. Patient is very satisfied with their life. Eyesight was rated as same. Hearing was rated as same. Patient feels that their emotional and mental health rating is same. Patients states they are never, rarely angry. Patient states they are never, rarely unusually tired/fatigued. Pain experienced in the last 7 days has been some. Patient's pain rating has been 2/10. Patient states that he has experienced no weight loss or gain in last 6 months. Lower back pain, when sitting pain lessens, when moving pain is discomforting and can feel it     Depression Screening:   PHQ-2 Score: 0      Fall Risk Screening:    In the past year, patient has experienced: no history of falling in past year      Home Safety:  Patient does not have trouble with stairs inside or outside of their home. Patient has working smoke alarms and has working carbon monoxide detector. Home safety hazards include: none. Nutrition:   Current diet is Regular and Diabetic. Careful on sugar     Medications:   Patient is currently taking over-the-counter supplements. OTC medications include: see medication list. Patient is able to manage medications. Activities of Daily Living (ADLs)/Instrumental Activities of Daily Living (IADLs):   Walk and transfer into and out of bed and chair?: Yes  Dress and groom yourself?: Yes    Bathe or shower yourself?: Yes    Feed yourself? Yes  Do your laundry/housekeeping?: Yes  Manage your money, pay your bills and track your expenses?: Yes  Make your own meals?: Yes    Do your own shopping?: Yes    Previous Hospitalizations:   Any hospitalizations or ED visits within the last 12 months?: Yes    How many hospitalizations have you had in the last year?: 1-2    Advance Care Planning:   Living will: Yes    Advanced directive: Yes    Five wishes given: No      Cognitive Screening:   Provider or family/friend/caregiver concerned regarding cognition?: No    PREVENTIVE SCREENINGS      Cardiovascular Screening:    General: Screening Not Indicated and History Lipid Disorder      Diabetes Screening:     General: Screening Not Indicated and History Diabetes      Colorectal Cancer Screening:     General: Screening Current      Abdominal Aortic Aneurysm (AAA) Screening:    Risk factors include: age between 70-77 yo        Lung Cancer Screening:     General: Screening Not Indicated      Hepatitis C Screening:    General: Screening Current    Screening, Brief Intervention, and Referral to Treatment (SBIRT)    Screening  Typical number of drinks in a day: 0  Typical number of drinks in a week: 0  Interpretation: Low risk drinking behavior.     AUDIT-C Screenin) How often did you have a drink containing alcohol in the past year? never  2) How many drinks did you have on a typical day when you were drinking in the past year? 0  3) How often did you have 6 or more drinks on one occasion in the past year? never    AUDIT-C Score: 0  Interpretation: Score 0-3 (male): Negative screen for alcohol misuse    Single Item Drug Screening:  How often have you used an illegal drug (including marijuana) or a prescription medication for non-medical reasons in the past year? never    Single Item Drug Screen Score: 0  Interpretation: Negative screen for possible drug use disorder    Other Counseling Topics:   Regular weightbearing exercise. No results found. Physical Exam:     Ht 5' 10" (1.778 m)   Wt 108 kg (238 lb)   BMI 34.15 kg/m²     Physical Exam  Constitutional:       Appearance: He is well-developed. HENT:      Head: Normocephalic. Right Ear: External ear normal.      Left Ear: External ear normal.      Nose: Nose normal.   Eyes:      Pupils: Pupils are equal, round, and reactive to light. Cardiovascular:      Rate and Rhythm: Normal rate and regular rhythm. Heart sounds: Normal heart sounds. Pulmonary:      Effort: Pulmonary effort is normal.      Breath sounds: Normal breath sounds. Abdominal:      General: Bowel sounds are normal.      Palpations: Abdomen is soft. Tenderness: There is no abdominal tenderness. Genitourinary:     Prostate: Normal.   Musculoskeletal:         General: Tenderness present. Normal range of motion. Cervical back: Normal range of motion and neck supple. Skin:     General: Skin is warm and dry. Neurological:      Mental Status: He is alert and oriented to person, place, and time. Deep Tendon Reflexes: Reflexes normal.   Psychiatric:         Behavior: Behavior normal.         Thought Content: Thought content normal.         Judgment: Judgment normal.        We will check a urine to make sure there is no signs of infection. We will do a diabetic kidney health evaluation as well.   We will refill his glucometer and test strips Nevaeh Orosco, DO

## 2023-10-18 NOTE — PATIENT INSTRUCTIONS
Medicare Preventive Visit Patient Instructions  Thank you for completing your Welcome to Medicare Visit or Medicare Annual Wellness Visit today. Your next wellness visit will be due in one year (10/18/2024). The screening/preventive services that you may require over the next 5-10 years are detailed below. Some tests may not apply to you based off risk factors and/or age. Screening tests ordered at today's visit but not completed yet may show as past due. Also, please note that scanned in results may not display below. Preventive Screenings:  Service Recommendations Previous Testing/Comments   Colorectal Cancer Screening  Colonoscopy    Fecal Occult Blood Test (FOBT)/Fecal Immunochemical Test (FIT)  Fecal DNA/Cologuard Test  Flexible Sigmoidoscopy Age: 43-73 years old   Colonoscopy: every 10 years (May be performed more frequently if at higher risk)  OR  FOBT/FIT: every 1 year  OR  Cologuard: every 3 years  OR  Sigmoidoscopy: every 5 years  Screening may be recommended earlier than age 39 if at higher risk for colorectal cancer. Also, an individualized decision between you and your healthcare provider will decide whether screening between the ages of 77-80 would be appropriate.  Colonoscopy: 04/27/2021  FOBT/FIT: Not on file  Cologuard: Not on file  Sigmoidoscopy: Not on file    Screening Current     Prostate Cancer Screening Individualized decision between patient and health care provider in men between ages of 53-66   Medicare will cover every 12 months beginning on the day after your 50th birthday PSA: 2.8 ng/mL           Hepatitis C Screening Once for adults born between 1945 and 1965  More frequently in patients at high risk for Hepatitis C Hep C Antibody: 10/22/2018    Screening Current   Diabetes Screening 1-2 times per year if you're at risk for diabetes or have pre-diabetes Fasting glucose: No results in last 5 years (No results in last 5 years)  A1C: 10.9 % (9/26/2023)  Screening Not Indicated  History Diabetes   Cholesterol Screening Once every 5 years if you don't have a lipid disorder. May order more often based on risk factors. Lipid panel: 05/20/2020  Screening Not Indicated  History Lipid Disorder      Other Preventive Screenings Covered by Medicare:  Abdominal Aortic Aneurysm (AAA) Screening: covered once if your at risk. You're considered to be at risk if you have a family history of AAA or a male between the age of 70-76 who smoking at least 100 cigarettes in your lifetime. Lung Cancer Screening: covers low dose CT scan once per year if you meet all of the following conditions: (1) Age 48-67; (2) No signs or symptoms of lung cancer; (3) Current smoker or have quit smoking within the last 15 years; (4) You have a tobacco smoking history of at least 20 pack years (packs per day x number of years you smoked); (5) You get a written order from a healthcare provider. Glaucoma Screening: covered annually if you're considered high risk: (1) You have diabetes OR (2) Family history of glaucoma OR (3)  aged 48 and older OR (3)  American aged 72 and older  Osteoporosis Screening: covered every 2 years if you meet one of the following conditions: (1) Have a vertebral abnormality; (2) On glucocorticoid therapy for more than 3 months; (3) Have primary hyperparathyroidism; (4) On osteoporosis medications and need to assess response to drug therapy. HIV Screening: covered annually if you're between the age of 14-79. Also covered annually if you are younger than 13 and older than 72 with risk factors for HIV infection. For pregnant patients, it is covered up to 3 times per pregnancy.     Immunizations:  Immunization Recommendations   Influenza Vaccine Annual influenza vaccination during flu season is recommended for all persons aged >= 6 months who do not have contraindications   Pneumococcal Vaccine   * Pneumococcal conjugate vaccine = PCV13 (Prevnar 13), PCV15 (Vaxneuvance), PCV20 (Prevnar 20)  * Pneumococcal polysaccharide vaccine = PPSV23 (Pneumovax) Adults 60-45 yo with certain risk factors or if 69+ yo  If never received any pneumonia vaccine: recommend Prevnar 20 (PCV20)  Give PCV20 if previously received 1 dose of PCV13 or PPSV23   Hepatitis B Vaccine 3 dose series if at intermediate or high risk (ex: diabetes, end stage renal disease, liver disease)   Respiratory syncytial virus (RSV) Vaccine - COVERED BY MEDICARE PART D  * RSVPreF3 (Arexvy) CDC recommends that adults 61years of age and older may receive a single dose of RSV vaccine using shared clinical decision-making (SCDM)   Tetanus (Td) Vaccine - COST NOT COVERED BY MEDICARE PART B Following completion of primary series, a booster dose should be given every 10 years to maintain immunity against tetanus. Td may also be given as tetanus wound prophylaxis. Tdap Vaccine - COST NOT COVERED BY MEDICARE PART B Recommended at least once for all adults. For pregnant patients, recommended with each pregnancy. Shingles Vaccine (Shingrix) - COST NOT COVERED BY MEDICARE PART B  2 shot series recommended in those 23 years and older who have or will have weakened immune systems or those 50 years and older     Health Maintenance Due:      Topic Date Due   • Colorectal Cancer Screening  04/27/2026   • Hepatitis C Screening  Completed     Immunizations Due:      Topic Date Due   • COVID-19 Vaccine (1) Never done   • Pneumococcal Vaccine: 65+ Years (1 - PCV) Never done   • Influenza Vaccine (1) Never done     Advance Directives   What are advance directives? Advance directives are legal documents that state your wishes and plans for medical care. These plans are made ahead of time in case you lose your ability to make decisions for yourself. Advance directives can apply to any medical decision, such as the treatments you want, and if you want to donate organs. What are the types of advance directives?   There are many types of advance directives, and each state has rules about how to use them. You may choose a combination of any of the following:  Living will: This is a written record of the treatment you want. You can also choose which treatments you do not want, which to limit, and which to stop at a certain time. This includes surgery, medicine, IV fluid, and tube feedings. Durable power of  for [facility]): This is a written record that states who you want to make healthcare choices for you when you are unable to make them for yourself. This person, called a proxy, is usually a family member or a friend. You may choose more than 1 proxy. Do not resuscitate (DNR) order:  A DNR order is used in case your heart stops beating or you stop breathing. It is a request not to have certain forms of treatment, such as CPR. A DNR order may be included in other types of advance directives. Medical directive: This covers the care that you want if you are in a coma, near death, or unable to make decisions for yourself. You can list the treatments you want for each condition. Treatment may include pain medicine, surgery, blood transfusions, dialysis, IV or tube feedings, and a ventilator (breathing machine). Values history: This document has questions about your views, beliefs, and how you feel and think about life. This information can help others choose the care that you would choose. Why are advance directives important? An advance directive helps you control your care. Although spoken wishes may be used, it is better to have your wishes written down. Spoken wishes can be misunderstood, or not followed. Treatments may be given even if you do not want them. An advance directive may make it easier for your family to make difficult choices about your care.    Weight Management   Why it is important to manage your weight:  Being overweight increases your risk of health conditions such as heart disease, high blood pressure, type 2 diabetes, and certain types of cancer. It can also increase your risk for osteoarthritis, sleep apnea, and other respiratory problems. Aim for a slow, steady weight loss. Even a small amount of weight loss can lower your risk of health problems. How to lose weight safely:  A safe and healthy way to lose weight is to eat fewer calories and get regular exercise. You can lose up about 1 pound a week by decreasing the number of calories you eat by 500 calories each day. Healthy meal plan for weight management:  A healthy meal plan includes a variety of foods, contains fewer calories, and helps you stay healthy. A healthy meal plan includes the following:  Eat whole-grain foods more often. A healthy meal plan should contain fiber. Fiber is the part of grains, fruits, and vegetables that is not broken down by your body. Whole-grain foods are healthy and provide extra fiber in your diet. Some examples of whole-grain foods are whole-wheat breads and pastas, oatmeal, brown rice, and bulgur. Eat a variety of vegetables every day. Include dark, leafy greens such as spinach, kale, gunner greens, and mustard greens. Eat yellow and orange vegetables such as carrots, sweet potatoes, and winter squash. Eat a variety of fruits every day. Choose fresh or canned fruit (canned in its own juice or light syrup) instead of juice. Fruit juice has very little or no fiber. Eat low-fat dairy foods. Drink fat-free (skim) milk or 1% milk. Eat fat-free yogurt and low-fat cottage cheese. Try low-fat cheeses such as mozzarella and other reduced-fat cheeses. Choose meat and other protein foods that are low in fat. Choose beans or other legumes such as split peas or lentils. Choose fish, skinless poultry (chicken or turkey), or lean cuts of red meat (beef or pork). Before you cook meat or poultry, cut off any visible fat. Use less fat and oil. Try baking foods instead of frying them.  Add less fat, such as margarine, sour cream, regular salad dressing and mayonnaise to foods. Eat fewer high-fat foods. Some examples of high-fat foods include french fries, doughnuts, ice cream, and cakes. Eat fewer sweets. Limit foods and drinks that are high in sugar. This includes candy, cookies, regular soda, and sweetened drinks. Exercise:  Exercise at least 30 minutes per day on most days of the week. Some examples of exercise include walking, biking, dancing, and swimming. You can also fit in more physical activity by taking the stairs instead of the elevator or parking farther away from stores. Ask your healthcare provider about the best exercise plan for you. © Copyright [employer] 2018 Information is for End User's use only and may not be sold, redistributed or otherwise used for commercial purposes.  All illustrations and images included in CareNotes® are the copyrighted property of [employer] or [address] [place] [address]

## 2023-10-31 DIAGNOSIS — E11.65 UNCONTROLLED TYPE 2 DIABETES MELLITUS WITH HYPERGLYCEMIA (HCC): ICD-10-CM

## 2023-11-01 RX ORDER — BLOOD-GLUCOSE METER
KIT MISCELLANEOUS
Qty: 200 STRIP | Refills: 2 | Status: SHIPPED | OUTPATIENT
Start: 2023-11-01 | End: 2023-11-02 | Stop reason: SDUPTHER

## 2023-11-02 ENCOUNTER — OFFICE VISIT (OUTPATIENT)
Dept: ENDOCRINOLOGY | Facility: CLINIC | Age: 68
End: 2023-11-02
Payer: MEDICARE

## 2023-11-02 VITALS
HEIGHT: 71 IN | WEIGHT: 241.2 LBS | DIASTOLIC BLOOD PRESSURE: 82 MMHG | SYSTOLIC BLOOD PRESSURE: 132 MMHG | BODY MASS INDEX: 33.77 KG/M2

## 2023-11-02 DIAGNOSIS — E11.65 UNCONTROLLED TYPE 2 DIABETES MELLITUS WITH HYPERGLYCEMIA (HCC): ICD-10-CM

## 2023-11-02 PROCEDURE — 99214 OFFICE O/P EST MOD 30 MIN: CPT | Performed by: STUDENT IN AN ORGANIZED HEALTH CARE EDUCATION/TRAINING PROGRAM

## 2023-11-02 RX ORDER — INSULIN ASPART INJECTION 100 [IU]/ML
16 INJECTION, SOLUTION SUBCUTANEOUS
Qty: 30 ML | Refills: 2 | Status: SHIPPED | OUTPATIENT
Start: 2023-11-02

## 2023-11-02 RX ORDER — INSULIN DEGLUDEC INJECTION 100 U/ML
36 INJECTION, SOLUTION SUBCUTANEOUS
Qty: 15 ML | Refills: 2 | Status: SHIPPED | OUTPATIENT
Start: 2023-11-02

## 2023-11-02 RX ORDER — PEN NEEDLE, DIABETIC 29 G X1/2"
NEEDLE, DISPOSABLE MISCELLANEOUS 4 TIMES DAILY
Qty: 400 EACH | Refills: 99 | Status: SHIPPED | OUTPATIENT
Start: 2023-11-02

## 2023-11-02 RX ORDER — BLOOD-GLUCOSE METER
KIT MISCELLANEOUS
Qty: 400 STRIP | Refills: 2 | Status: SHIPPED | OUTPATIENT
Start: 2023-11-02

## 2023-11-02 NOTE — PROGRESS NOTES
Juno Husain  70 y.o. Male MRN: [id number]  Encounter: [id number]     Established Patient Progress Note    CC: Type 2 diabetes follow-up      ASSESSMENT AND PLAN  Assessment:   Juno Husain is a 53-year-old male with a past medical history significant for insulin-requiring type 2 diabetes mellitus and hypertension    Plan:  Type 2 diabetes mellitus with hyperglycemia  - A1c: 10.9% (9/26/23)  - Urine microalb/creat: 11 (10/18/23)  - Continue Metformin  mg daily with dinner  - Decrease Tresiba to 36 units qhs   - Continue Fiasp 16 units before each meal   - Advised patient to check blood glucose at least 4 times daily (before meals and bedtime) and send this office a log sheet in 2 weeks for review; will consider further adjustments to prandial insulin at that time  - Can consider GLP-1 analog use in the future if his glycemic trends continue to improve   - I did discuss use of CGM with the patient but he would not like to pursue this at this time  - Referral to diabetes education provided for MNT   - Counseled patient on following an ADA diet with aerobic exercise for at least 30 minutes per day, 5 days per week as tolerated  - Recommend annual retinopathy screening  - Reviewed hypoglycemic symptoms and management   - RTC in 3 months with CMP and HgbA1c lab work done 1 week prior     2. Hypertension  - Taking and tolerating losartan 10 mg daily well   - Continue regular follow-up with PCP       HISTORY OF PRESENT ILLNESS  HPI:  Juno Husain is a 53-year-old male with a past medical history significant for insulin-requiring type 2 diabetes mellitus and hypertension who presents for follow-up for his type 2 diabetes mellitus. He was previously seen inpatient in the [facility] on 9/28/23 when he was admitted for a perirectal abscess s/p I&D. He presents today with his significant other who provided collateral history.      Diagnosed: 8-10 years ago, symptomatic with polydipsia and polyuria and A1c ~10%    Initial treatment: Metformin and glimepiride with subsequent improvement of A1c to ~6.8%    Current medications:     Metformin ER  mg 1 tab daily with dinner    Tresiba 40 units qhs    Fiasp 16 units before each meal     Injects in right lower abdomen and rotates sites locally. I did  patient to rotate injection sites around the abdomen. Recent hospitalizations: 9/26-9/28 for perirectal abscess   Recent steroid use: Denies   Recent hyper- or hypoglycemia: Denies     Glycemic Control   A1c: 10.9% (9/26/23)   Self-Monitored Blood Glucose     Fingersticks: 2 times daily       Glucometer: Freestyle      Ranges:       Fasting: Occasionally in 80s, usually in 110s-120s      Bedtime: 170s      Hypoglycemia: Denies but has not been checking glucose        Awareness: Lightheadedness    Lifestyle    Diabetes education: No formal     Diet:        Breakfast: Cereal (Cheerios, Raisin Bran) w/whole milk, egg sandwich (Gt's Crazy Bread), oatmeal         Lunch: Ham sandwich, chicken salad, roast beef, slice pizza        Dinner: Cheese steak w/o roll, steak, tortellini, homemade chicken soup, salad w/meat, Stein's pie        Snacks: Celery or apple w/peanut butter, Ashly Doones        Drinks: Cranberry juice w/water, lots of water, 1 alcoholic drink 4-5 nights/week, coffee w/1.5 tbsp sugar and half-and-half    Physical activity: Work (contractor)      Complications     Retinopathy/macular edema: 2 years ago, scheduled for 12/8/23 for follow-up     Nephropathy: None     Neuropathy: Denies      Macrovascular: Denies      ASCVD: Not on statin at this time; previous LDL 79 on 5/20/20     Hypertension: On lisinopril 10 mg daily; followed by PCP     Diabetic foot ulcers: None      Dental disease:  On Amoxicillin for previous dental infection in past 2 weeks    Vaccinations:    Influenza: Never    Pneumonia: Never      Diabetic ROS: Denies polyphagia, polydipsia, polyuria, paresthesias or numbness of extremities, blurry vision, or recent weight gain or loss. History of thyroid disorders: Denies  History of pancreatitis: Denies     Family history is significant for a thyroidectomy (not cancer) and type 2 diabetes mellitus. Tobacco: Denies   Alcohol: Social  Illicit drugs: Denies  Lives with: S/O  Occupation: Contractor    Review of Systems   Constitutional:  Negative for chills and fever. HENT:  Negative for ear pain and sore throat. Eyes:  Negative for pain and visual disturbance. Respiratory:  Negative for cough and shortness of breath. Cardiovascular:  Negative for chest pain and palpitations. Gastrointestinal:  Negative for abdominal pain and vomiting. Genitourinary:  Negative for dysuria and hematuria. Musculoskeletal:  Negative for arthralgias and back pain. Skin:  Negative for color change and rash. Neurological:  Negative for seizures and syncope. All other systems reviewed and are negative. Patient Active Problem List   Diagnosis    Essential hypertension    Thrombocytopenia (HCC)    Vitamin D deficiency    Chronic tension-type headache, not intractable    Type 2 diabetes mellitus without complication, without long-term current use of insulin (HCC)    Controlled type 2 diabetes mellitus without complication, without long-term current use of insulin (HCC)    Mixed hyperlipidemia    Epithelial inclusion cyst    Class 2 severe obesity with body mass index (BMI) of 35 to 39.9 with serious comorbidity ([address])    Uncontrolled type 2 diabetes mellitus with hyperglycemia (HCC)    Acute bronchitis    Perirectal abscess    Sepsis ([address])    Aortic root dilation ([address])      Past Medical History:   Diagnosis Date    Cyst of skin 04/29/2021    Behind right ear    Diabetes mellitus ([address])     Epithelial inclusion cyst 7/7/2021      Past Surgical History:   Procedure Laterality Date    CYST REMOVAL Right 04/29/2021    Excision of skin cyst from behind right ear.   Office procedure      Family History   Problem Relation Age of Onset    Hypertension Father     Colon cancer Neg Hx     Colon polyps Neg Hx      Social History     Tobacco Use    Smoking status: Never    Smokeless tobacco: Never   Substance Use Topics    Alcohol use:  Yes     Alcohol/week: 1.0 standard drink of alcohol     Types: 1 Standard drinks or equivalent per week     No Known Allergies      Current Outpatient Medications:     Alcohol Swabs PADS, Use 4 (four) times a day, Disp: 100 each, Rfl: 99    Blood Glucose Monitoring Suppl (FreeStyle Lite) TAMMY, by Percutaneous route 2 (two) times a day, Disp: 1 each, Rfl: 0    FREESTYLE LITE test strip, TEST AS DIRECTED, Disp: 200 strip, Rfl: 2    glucose monitoring kit (FREESTYLE) monitoring kit, 1 each by Does not apply route 2 (two) times a day before lunch and dinner E11.65, Disp: 1 each, Rfl: 0    insulin aspart, w/niacinamide, (Fiasp FlexTouch) 100 Units/mL injection pen, Inject 16 Units under the skin 3 (three) times a day with meals 5-15 min before eating, Disp: 15 mL, Rfl: 0    insulin degludec Lizeth [place] FlexTouch) 100 units/mL injection pen, Inject 40 Units under the skin daily at bedtime, Disp: 15 mL, Rfl: 0    Insulin Pen Needle (PEN NEEDLES 29GX1/2") 29G X 12MM MISC, Use 4 (four) times a day, Disp: 100 each, Rfl: 99    Lancets (freestyle) lancets, Use as instructed E11.65 testing twice a day, Disp: 100 each, Rfl: 0    lisinopril (ZESTRIL) 10 mg tablet, Take 1 tablet (10 mg total) by mouth daily Do not start before September 29, 2023., Disp: 30 tablet, Rfl: 0    metFORMIN (GLUCOPHAGE-XR) 500 mg 24 hr tablet, TAKE 1 TABLET BY MOUTH EVERY DAY WITH DINNER, Disp: 90 tablet, Rfl: 1    sildenafil (VIAGRA) 50 MG tablet, Take 2 tablets (100 mg total) by mouth as needed for erectile dysfunction, Disp: 30 tablet, Rfl: 5      OBJECTIVE  Visit Vitals  Ht 5' 11" (1.803 m)   Wt 109 kg (241 lb 3.2 oz)   BMI 33.64 kg/m²   Smoking Status Never   BSA 2.28 m²     Wt Readings from Last 3 Encounters: 11/02/23 109 kg (241 lb 3.2 oz)   10/18/23 108 kg (238 lb)   10/11/23 107 kg (236 lb)       Physical Exam  Constitutional:       General: He is not in acute distress. Appearance: Normal appearance. HENT:      Head: Normocephalic and atraumatic. Mouth/Throat:      Mouth: Mucous membranes are moist.      Pharynx: Oropharynx is clear. Eyes:      Extraocular Movements: Extraocular movements intact. Conjunctiva/sclera: Conjunctivae normal.      Pupils: Pupils are equal, round, and reactive to light. Cardiovascular:      Rate and Rhythm: Normal rate and regular rhythm. Pulses: no weak pulses          Dorsalis pedis pulses are 2+ on the right side and 2+ on the left side. Posterior tibial pulses are 2+ on the right side and 2+ on the left side. Pulmonary:      Effort: Pulmonary effort is normal.      Breath sounds: Normal breath sounds. Abdominal:      General: Abdomen is flat. There is no distension. Palpations: Abdomen is soft. Tenderness: There is no abdominal tenderness. Musculoskeletal:         General: No swelling or deformity. Normal range of motion. Cervical back: Normal range of motion and neck supple. Feet:      Right foot:      Skin integrity: No ulcer, skin breakdown, erythema, warmth, callus or dry skin. Left foot:      Skin integrity: No ulcer, skin breakdown, erythema, warmth, callus or dry skin. Lymphadenopathy:      Cervical: No cervical adenopathy. Skin:     General: Skin is warm and dry. Neurological:      General: No focal deficit present. Mental Status: He is alert and oriented to person, place, and time. Psychiatric:         Mood and Affect: Mood normal.         Behavior: Behavior normal.       Diabetic Foot Exam    Patient's shoes and socks removed. Right Foot/Ankle   Right Foot Inspection  Skin Exam: skin normal and skin intact.  No dry skin, no warmth, no callus, no erythema, no maceration, no abnormal color, no pre-ulcer, no ulcer and no callus. Toe Exam: ROM and strength within normal limits. Sensory   Vibration: intact  Proprioception: intact  Monofilament testing: intact    Vascular  Capillary refills: < 3 seconds  The right DP pulse is 2+. The right PT pulse is 2+. Left Foot/Ankle  Left Foot Inspection  Skin Exam: skin normal and skin intact. No dry skin, no warmth, no erythema, no maceration, normal color, no pre-ulcer, no ulcer and no callus. Toe Exam: ROM and strength within normal limits. Sensory   Vibration: intact  Proprioception: intact  Monofilament testing: intact    Vascular  Capillary refills: < 3 seconds  The left DP pulse is 2+. The left PT pulse is 2+. Assign Risk Category  No deformity present  No loss of protective sensation  No weak pulses  Risk: 0    Labs:   Lab Results   Component Value Date    HGBA1C 10.9 (H) 09/26/2023    HGBA1C 7.1 (A) 07/18/2022    HGBA1C 6.3 02/23/2022     Lab Results   Component Value Date    GLUC 121 09/27/2023       Lab Results   Component Value Date    CREATININE 0.97 09/27/2023    CREATININE 0.99 09/25/2023    CREATININE 1.02 05/20/2020    BUN 13 09/27/2023    K 3.5 09/27/2023     09/27/2023    CO2 28 09/27/2023    EGFR 79 09/27/2023     Albumin Creat Ratio   Date Value Ref Range Status   10/18/2023 11 0 - 30 mg/g creatinine Final       Lab Results   Component Value Date    HDL 42 05/20/2020    TRIG 154 (H) 05/20/2020    CHOLHDL 5.5 (H) 10/22/2018       Lab Results   Component Value Date    ALT 14 09/25/2023    AST 16 09/25/2023    ALKPHOS 86 09/25/2023       Lab Results   Component Value Date    TSH 1.710 10/22/2018     Discussed with the patient and all questioned fully answered. He will call me if any problems arise. Counseled patient on diagnostic results, prognosis, risk and benefit of treatment options, instruction for management, importance of treatment compliance, risk factor reduction, and impressions.

## 2023-11-02 NOTE — PATIENT INSTRUCTIONS
- Please take a lower dose of Tresiba 36 units daily at bedtime  - Continue taking Fiasp 16 units at least fifteen minutes before each meal   - Check your blood glucose 4 times daily (before each meal and at bedtime) and please send us the blood glucose log in 2 weeks for review; once we have reviewed these numbers I will call you to let you know if your insulin can be further adjusted  - Please call the office if you notice your blood glucose is consistently low (<70) or consistently high (>300)   - You have been referred to diabetes education for medical nutrition therapy; please consider meeting with an educator to have any additional questions about your lifestyle answered  - We will see you in 3 months and have your lab work done 1 week prior to that visit

## 2023-11-27 PROBLEM — A41.9 SEPSIS (HCC): Status: RESOLVED | Noted: 2023-09-28 | Resolved: 2023-11-27

## 2023-11-29 DIAGNOSIS — E11.65 UNCONTROLLED TYPE 2 DIABETES MELLITUS WITH HYPERGLYCEMIA (HCC): ICD-10-CM

## 2023-11-30 RX ORDER — INSULIN DEGLUDEC INJECTION 100 U/ML
36 INJECTION, SOLUTION SUBCUTANEOUS
Qty: 33 ML | Refills: 0 | Status: SHIPPED | OUTPATIENT
Start: 2023-11-30

## 2023-11-30 RX ORDER — INSULIN ASPART INJECTION 100 [IU]/ML
16 INJECTION, SOLUTION SUBCUTANEOUS
Qty: 44 ML | Refills: 0 | Status: SHIPPED | OUTPATIENT
Start: 2023-11-30

## 2023-12-19 ENCOUNTER — TELEPHONE (OUTPATIENT)
Dept: DIABETES SERVICES | Facility: HOSPITAL | Age: 68
End: 2023-12-19

## 2023-12-19 ENCOUNTER — OFFICE VISIT (OUTPATIENT)
Dept: DIABETES SERVICES | Facility: HOSPITAL | Age: 68
End: 2023-12-19
Payer: MEDICARE

## 2023-12-19 VITALS — BODY MASS INDEX: 33.74 KG/M2 | WEIGHT: 241 LBS | HEIGHT: 71 IN

## 2023-12-19 DIAGNOSIS — E11.65 UNCONTROLLED TYPE 2 DIABETES MELLITUS WITH HYPERGLYCEMIA (HCC): Primary | ICD-10-CM

## 2023-12-19 PROCEDURE — 97802 MEDICAL NUTRITION INDIV IN: CPT | Performed by: DIETITIAN, REGISTERED

## 2023-12-19 NOTE — PROGRESS NOTES
"Medical Nutrition Therapy     Assessment    Visit Type: Initial visit  Chief complaint:  Type 2 Diabetes     HPI:  Met with Syed and his partner for initial MNT Testing blood sugars once a day to 3 time a day in spurts: from memory 120-170.  States blood sugars used to be under decent control, A1c recently 10.9%, increased from 7.1% a year ago.  New to being on basal bolus insulin therapy.  Discussed proper plan for taking insulin, he will often take his mealtime insulin after meal or sometimes not at all if he is out, discussed the importance of consistently taking it and the importance of taking it before meal, he will work on that.  Also discussed the need for data to help with managing blood sugars.  We discussed a continuous glucose monitor, he would like for us to order a Dexcom G7 for him, I we will request this from Dr. Ribeiro.  Food recall shows primary issues: Inconsistent carbohydrate intake throughout the day, and some meals have very high carbs, such as raisin bran with extra sugar in the mornings.  Discussed the benefit of consistent carbohydrate intake throughout the day for steady blood sugars, especially with set insulin doses.  Together we discussed what foods contain CHO, reading a food label, serving sizes, and set a carb goal of 45-60g CHO/meal to promote weight loss with 15g snacks. Put together sample meals for Syed’s reference and evaluated Syed’s current eating plan. Good understanding, Syed will call with questions or for more education. Follow up as needed if not improving.    Ht Readings from Last 1 Encounters:   12/19/23 5' 11\" (1.803 m)     Wt Readings from Last 3 Encounters:   12/19/23 109 kg (241 lb)   11/02/23 109 kg (241 lb 3.2 oz)   10/18/23 108 kg (238 lb)        Body mass index is 33.61 kg/m².     Lab Results   Component Value Date    HGBA1C 10.9 (H) 09/26/2023    HGBA1C 7.1 (A) 07/18/2022    HGBA1C 6.3 02/23/2022       No results found for: \"CHOL\"  Lab Results   Component " Value Date    HDL 42 05/20/2020    HDL 35 (L) 10/22/2018     Lab Results   Component Value Date    LDLCALC 79 05/20/2020    LDLCALC 95 10/22/2018     Lab Results   Component Value Date    TRIG 154 (H) 05/20/2020    TRIG 317 (H) 10/22/2018     Lab Results   Component Value Date    CHOLHDL 5.5 (H) 10/22/2018       Weight Change: No    Medical Diagnosis/ICD 10 Code:  E11.65    Barriers to Learning: no barriers    Do you follow any special diet presently?: No  Who shops: patient and spouse  Who cooks: patient and spouse    Food Log: Completed via the method of food recall    Breakfast: up around 5am: 6-7am: cereal raisin bran or cherrios with milk and coffee,   oatmeal canister 1c dry with water and brown sugars and coffee, english muffin with butter. Eggs and toast 2 maybe howard.   Morning Snack: not much   Lunch:11-2pm: sandwich, bar, fruit berries, cookies 3   Afternoon Snack: leftovers from lunch  Dinner:6-8:30pm: out 5-6 nights a week: sit down meals. Share a meal.   Evening Snack: fruit or ice cream at the end of the day. Sometimes nothing about half the time. Bed 8-9pm:   Beverages: water, iced tea unsweet with lemon, rare soda, coffee with real cream and sugar, vodka and cranberry one, not much juice, cranberry 50%, milk once a week after coffee in the morning a few times a week   Eating out/Take out: most days  Exercise ADL,     Nutrition Diagnosis:  Food and nutrition related knowledge deficit  related to Lack of prior exposure to accurate nutrition related information as evidenced by Verbalizes inaccurate or incomplete information    Intervention: label reading, behavior modification strategies, carbohydrate counting, individualized meal plan, and monitoring portion control     Treatment Goals: Patient understands education and recommendations    Education Material Given  Syed was provided the Portion Booklet and Planning Healthy Meals     Monitoring and evaluation:    Term code indicator  FH 1.6.3  Carbohydrate Intake Criteria: 45-60g CHO per meal, 15g CHO snacks    Patient’s Response to Instruction:  Comprehensiongood  Motivationgood  Expected Compliancegood    Thank you for coming to the [facility] for education today.  Please feel free to call with any questions or concerns.    Marilee Fabian, RD  [address]  [address] [id number]

## 2023-12-19 NOTE — TELEPHONE ENCOUNTER
Girish Ribeiro,    Met with Syed for MNT today. He would like to try a Dexcom G7, now that he is on insulin it should likely get covered. Can you please order? Thanks!    -Marilee

## 2023-12-20 DIAGNOSIS — E11.65 UNCONTROLLED TYPE 2 DIABETES MELLITUS WITH HYPERGLYCEMIA (HCC): Primary | ICD-10-CM

## 2023-12-20 RX ORDER — ACYCLOVIR 400 MG/1
1 TABLET ORAL DAILY
Qty: 1 EACH | Refills: 0 | Status: SHIPPED | OUTPATIENT
Start: 2023-12-20

## 2023-12-20 RX ORDER — ACYCLOVIR 400 MG/1
1 TABLET ORAL
Qty: 3 EACH | Refills: 3 | Status: SHIPPED | OUTPATIENT
Start: 2023-12-20

## 2023-12-29 NOTE — ASSESSMENT & PLAN NOTE
Presented with a leukocytosis secondary to perirectal abscess.   · S/P I&D  · On IV abx above  · Improving  · Trend CBC Subjective: \"I am still having pain but no fractures per ortho\"  Falls since last visit: No (if yes complete the Fall Tracking Form and include bsrifallreport):   Caregiver involvement changes: n/a  Home health supplies by type and quantity ordered/delivered this visit include: n/a    Clinician asked if patient has had any physician contact since last home care visit and patient states: NO  Clinician asked if patient has any new or changed medications and patient states:  NO   If Yes, were medications reconciled? N/A   Was the certifying physician notified of changes in medications? N/A     Clinical assessment (what this visit means for the patient overall and need for ongoing skilled care) and progress or lack of progress towards SPECIFIC goals: Patient with increased short term memory loss and medication management difficulties requiring SN services for assessment, education and medication mangagement to reduce risk for rehospitalization. Patient progressing towards educational and med mgmt goals but not yet met. Assisted patient with at home PT. INR machine today to obtain INR. Results of 2.2 and 23.1. Results called to Deonte Forde NP    Written Teaching Material Utilized: N/A    Interdisciplinary communication with: Altagracia HARRIS, April RN at Dr Millie Colon office for the purpose of [facility] collaboration    Discharge planning as follows:  When goals are met    Specific plan for next visit: Assessment, education, medication pill pack setup

## 2024-01-10 ENCOUNTER — OFFICE VISIT (OUTPATIENT)
Dept: FAMILY MEDICINE CLINIC | Facility: CLINIC | Age: 69
End: 2024-01-10
Payer: MEDICARE

## 2024-01-10 VITALS
TEMPERATURE: 94.4 F | BODY MASS INDEX: 34.86 KG/M2 | OXYGEN SATURATION: 97 % | HEART RATE: 104 BPM | WEIGHT: 249 LBS | SYSTOLIC BLOOD PRESSURE: 140 MMHG | HEIGHT: 71 IN | DIASTOLIC BLOOD PRESSURE: 90 MMHG

## 2024-01-10 DIAGNOSIS — E11.65 UNCONTROLLED TYPE 2 DIABETES MELLITUS WITH HYPERGLYCEMIA (HCC): Primary | ICD-10-CM

## 2024-01-10 DIAGNOSIS — D69.6 THROMBOCYTOPENIA (HCC): ICD-10-CM

## 2024-01-10 DIAGNOSIS — I10 ESSENTIAL HYPERTENSION: ICD-10-CM

## 2024-01-10 DIAGNOSIS — E11.40 TYPE 2 DIABETES MELLITUS WITH DIABETIC NEUROPATHY, WITHOUT LONG-TERM CURRENT USE OF INSULIN (HCC): ICD-10-CM

## 2024-01-10 DIAGNOSIS — K61.1 PERIRECTAL ABSCESS: ICD-10-CM

## 2024-01-10 DIAGNOSIS — I77.810 THORACIC AORTIC ECTASIA (HCC): ICD-10-CM

## 2024-01-10 DIAGNOSIS — E66.01 MORBID (SEVERE) OBESITY DUE TO EXCESS CALORIES (HCC): ICD-10-CM

## 2024-01-10 DIAGNOSIS — R73.01 IMPAIRED FASTING GLUCOSE: ICD-10-CM

## 2024-01-10 LAB — SL AMB POCT HEMOGLOBIN AIC: 7.6 (ref ?–6.5)

## 2024-01-10 PROCEDURE — 83036 HEMOGLOBIN GLYCOSYLATED A1C: CPT | Performed by: FAMILY MEDICINE

## 2024-01-10 PROCEDURE — 99214 OFFICE O/P EST MOD 30 MIN: CPT | Performed by: FAMILY MEDICINE

## 2024-01-10 NOTE — PROGRESS NOTES
Subjective:   Chief Complaint   Patient presents with    Follow-up     Diabetic follow up , A1c completed         Patient ID: Syed Reese is a 68 y.o. male.    Patient is here for checkup.  His hemoglobin A1c is 7.6.  He has not been as compliant as he once was with his diabetes.  He continues to get some drainage from the perirectal abscess site.  He has not had any follow-up with the colorectal surgeon.      The following portions of the patient's history were reviewed and updated as appropriate: allergies, current medications, past family history, past medical history, past social history, past surgical history and problem list.    Review of Systems   Constitutional:  Negative for activity change, appetite change, chills, diaphoresis, fatigue and unexpected weight change.   HENT:  Negative for congestion, ear discharge, ear pain, hearing loss, nosebleeds and rhinorrhea.    Eyes:  Negative for pain, redness, itching and visual disturbance.   Respiratory:  Negative for cough, choking, chest tightness and shortness of breath.    Cardiovascular:  Negative for chest pain and leg swelling.   Gastrointestinal:  Positive for rectal pain. Negative for abdominal pain, blood in stool, constipation, diarrhea and nausea.   Endocrine: Negative for cold intolerance, polydipsia and polyphagia.   Genitourinary:  Negative for dysuria, frequency, hematuria and urgency.   Musculoskeletal:  Negative for arthralgias, back pain, gait problem, joint swelling, neck pain and neck stiffness.   Skin:  Negative for color change and rash.   Allergic/Immunologic: Negative for environmental allergies and food allergies.   Neurological:  Negative for dizziness, tremors, seizures, speech difficulty, numbness and headaches.   Hematological:  Negative for adenopathy. Does not bruise/bleed easily.   Psychiatric/Behavioral:  Negative for behavioral problems, dysphoric mood, hallucinations and self-injury.              Objective:  Vitals:     "01/10/24 0811   BP: 140/90   Pulse: 104   Temp: (!) 94.4 °F (34.7 °C)   TempSrc: Tympanic   SpO2: 97%   Weight: 113 kg (249 lb)   Height: 5' 11\" (1.803 m)      Physical Exam  Constitutional:       General: He is not in acute distress.     Appearance: He is well-developed. He is not diaphoretic.   HENT:      Head: Normocephalic and atraumatic.      Right Ear: External ear normal.      Left Ear: External ear normal.      Nose: Nose normal.      Mouth/Throat:      Pharynx: No oropharyngeal exudate.   Eyes:      General: No scleral icterus.        Right eye: No discharge.         Left eye: No discharge.      Conjunctiva/sclera: Conjunctivae normal.      Pupils: Pupils are equal, round, and reactive to light.   Neck:      Thyroid: No thyromegaly.   Cardiovascular:      Rate and Rhythm: Normal rate and regular rhythm.      Pulses: no weak pulses          Dorsalis pedis pulses are 1+ on the right side and 1+ on the left side.        Posterior tibial pulses are 1+ on the right side and 2+ on the left side.      Heart sounds: Normal heart sounds. No murmur heard.  Pulmonary:      Effort: Pulmonary effort is normal.      Breath sounds: Normal breath sounds. No wheezing or rales.   Abdominal:      General: Bowel sounds are normal.      Palpations: Abdomen is soft. There is no mass.      Tenderness: There is no abdominal tenderness. There is no guarding.   Musculoskeletal:         General: No tenderness. Normal range of motion.      Cervical back: Normal range of motion and neck supple.   Feet:      Right foot:      Skin integrity: No ulcer, skin breakdown, erythema, warmth, callus or dry skin.      Left foot:      Skin integrity: No ulcer, skin breakdown, erythema, warmth, callus or dry skin.   Lymphadenopathy:      Cervical: No cervical adenopathy.   Skin:     General: Skin is warm and dry.   Neurological:      Mental Status: He is alert and oriented to person, place, and time.      Deep Tendon Reflexes: Reflexes are normal " and symmetric.   Psychiatric:         Thought Content: Thought content normal.         Judgment: Judgment normal.     Patient's shoes and socks removed.    Right Foot/Ankle   Right Foot Inspection  Skin Exam: skin normal and skin intact. No dry skin, no warmth, no callus, no erythema, no maceration, no abnormal color, no pre-ulcer, no ulcer and no callus.     Toe Exam: ROM and strength within normal limits.     Sensory   Vibration: intact  Proprioception: intact  Monofilament testing: intact    Vascular  Capillary refills: < 3 seconds  The right DP pulse is 1+. The right PT pulse is 1+.     Left Foot/Ankle  Left Foot Inspection  Skin Exam: skin normal and skin intact. No dry skin, no warmth, no erythema, no maceration, normal color, no pre-ulcer, no ulcer and no callus.     Toe Exam: ROM and strength within normal limits.     Sensory   Vibration: intact  Proprioception: intact  Monofilament testing: intact    Vascular  Capillary refills: < 3 seconds  The left DP pulse is 1+. The left PT pulse is 2+.     Assign Risk Category  No deformity present  No loss of protective sensation  No weak pulses  Risk: 0     Assessment/Plan:    No problem-specific Assessment & Plan notes found for this encounter.       Diagnoses and all orders for this visit:    Uncontrolled type 2 diabetes mellitus with hyperglycemia (HCC)  -     POCT hemoglobin A1c    Impaired fasting glucose  -     POCT hemoglobin A1c    Essential hypertension    Perirectal abscess  -     Ambulatory Referral to General Surgery; Future    Thoracic aortic ectasia (HCC)    Morbid (severe) obesity due to excess calories (HCC)    Thrombocytopenia (HCC)    Type 2 diabetes mellitus with diabetic neuropathy, without long-term current use of insulin (HCC)        He will see the surgeons regarding further care to the perirectal abscess site.  Continue with careful monitoring of blood sugar.  Recheck with me in 3 months

## 2024-01-16 ENCOUNTER — TELEPHONE (OUTPATIENT)
Dept: FAMILY MEDICINE CLINIC | Facility: CLINIC | Age: 69
End: 2024-01-16

## 2024-01-16 NOTE — TELEPHONE ENCOUNTER
Pennie from [facility] called regarding a referral that was placed for general surgery on 1/10. Pennie said that her provider does not do perirectal abscess and that the pt would be better going to Colorectal Surgery. Pennie said that she did cancel that referral.

## 2024-02-08 ENCOUNTER — OFFICE VISIT (OUTPATIENT)
Dept: ENDOCRINOLOGY | Facility: CLINIC | Age: 69
End: 2024-02-08
Payer: MEDICARE

## 2024-02-08 VITALS
SYSTOLIC BLOOD PRESSURE: 148 MMHG | BODY MASS INDEX: 35.22 KG/M2 | WEIGHT: 251.6 LBS | HEART RATE: 79 BPM | DIASTOLIC BLOOD PRESSURE: 90 MMHG | OXYGEN SATURATION: 97 % | HEIGHT: 71 IN

## 2024-02-08 DIAGNOSIS — E11.65 UNCONTROLLED TYPE 2 DIABETES MELLITUS WITH HYPERGLYCEMIA (HCC): Primary | ICD-10-CM

## 2024-02-08 PROCEDURE — 99214 OFFICE O/P EST MOD 30 MIN: CPT | Performed by: STUDENT IN AN ORGANIZED HEALTH CARE EDUCATION/TRAINING PROGRAM

## 2024-02-08 NOTE — PROGRESS NOTES
Syed Reese  68 y.o.  Male MRN: [id number]  Encounter: [id number]     Established Patient Progress Note    CC: Type 2 diabetes follow-up      ASSESSMENT AND PLAN  Assessment:   Syed Reese is a 68-year-old male with a past medical history significant for insulin-requiring type 2 diabetes mellitus and hypertension    Plan:  Type 2 diabetes mellitus   - A1c: 7.6% (1/10/24)  - Urine microalb/creat: 11 (10/18/23)  - Advised patient to restart Metformin  mg daily with dinner  - Advised patient to take Tresiba 36 units qhs   - Advised patient to take Fiasp 16 units before each meal   - Counseled patient to check his blood glucose 1-2 times daily per his work schedule; if possible on his days off he is advised to check his lunchtime glucose since he is unable to do so on workdays  - Recommend sending the office a blood glucose log sheet in 2 weeks for review and insulin titration if appropriate  - I did discuss use of CGM with the patient but he would not like to pursue this at this time   - Counseled patient on following an ADA diet with aerobic exercise for at least 30 minutes per day, 5 days per week as tolerated  - Recommend annual retinopathy screening  - Reviewed hypoglycemic symptoms and management   - RTC in 3 months with CMP and HgbA1c lab work done 1 week prior     2.   Hypertension  - Patient has stopped taking antihypertensives  - Advised to follow-up with PCP       HISTORY OF PRESENT ILLNESS  HPI:  Syed Reese is a 68-year-old male with a past medical history significant for insulin-requiring type 2 diabetes mellitus (diagnosed 8-10 years ago) and hypertension who presents for follow-up for his type 2 diabetes mellitus. He was previously seen on 11/2/23 for a hospital follow-up visit.     Current medications:     Metformin ER  mg 1 tab daily with dinner (intermittently)     Tresiba 36-38 units qhs    Fiasp 18 units before each meal     Injects in right lower abdomen and rotates sites  locally.      Recent hospitalizations: Denies   Recent steroid use: Denies   Recent hyper- or hypoglycemia: Denies     Glycemic Control   A1c: 7.6% (1/10/24), previously 10.9% (23)   Fingerstick: checks once daily in the morning or in the evening; he is unable to check his blood glucose before lunch due to his work schedule    Mornins    Evenin-172    A Dexcom G7 CGM was prescribed on 23 per patient's request, however he no longer wishes to use this.    Lifestyle    Diabetes education: 23    Diet:        Breakfast: Cereal (Cheerios, Raisin Bran) w/whole milk, egg sandwich (Gt's Crazy Bread), oatmeal         Lunch: Ham sandwich, chicken salad, roast beef, slice pizza        Dinner: Cheese steak w/o roll, steak, tortellini, homemade chicken soup, salad w/meat, Stein's pie        Snacks: Celery or apple w/peanut butter, Ashly Doones        Drinks: Cranberry juice w/water, lots of water, 1 alcoholic drink 4-5 nights/week, coffee w/1.5 tbsp sugar and half-and-half    Physical activity: Work (contractor)      Complications     Retinopathy/macular edema: 2 years ago, needs to reschedule as he missed his previous appointment     Nephropathy: Urine A/C ratio 11 on 10/18/23     Neuropathy: Denies      Macrovascular: Denies      ASCVD: Not on statin at this time; previous LDL 79 on 20     Hypertension: Not on ACE/ARB; followed by PCP     Diabetic foot ulcers: None; foot exam performed 23     Dental disease: None recently    Vaccinations:    Influenza: Never    Pneumonia: Never  I had a lengthy discussion with Mr. Reese today related to his self-monitored blood glucose. He declines to use a CGM at this time and is resistant to checking his blood glucose multiple times daily. I explained that given his insulin use he should be checking his blood glucose before each meal and at bedtime to ensure he does not have hypoglycemia, but patient states that he does not have symptoms of  hypoglycemia therefore he does not believe he has any hypoglycemic episodes. I did explain that asymptomatic hypoglycemia can occur and he should be monitoring his blood glucose closely for this. I reviewed hypoglycemia management with him. I also discussed the need for close blood glucose monitoring for insulin titration and potential de-escalation to GLP-1 analogs if his glycemic control continues to improve and his insulin requirements decrease. He inquired about restarting glimepiride, but I explained that it would neither offer enough glycemic control nor the weight and cardiovascular risk reduction benefits of GLP-1s. Patient expresses understanding of the above and I advised him to send this office a blood glucose log sheet in 2 weeks for review so I can adjust his insulin doses. He was agreeable.     Review of Systems   Constitutional:  Negative for chills and fever.   HENT:  Negative for ear pain and sore throat.    Eyes:  Negative for pain and visual disturbance.   Respiratory:  Negative for cough and shortness of breath.    Cardiovascular:  Negative for chest pain and palpitations.   Gastrointestinal:  Negative for abdominal pain and vomiting.   Genitourinary:  Negative for dysuria and hematuria.   Musculoskeletal:  Negative for arthralgias and back pain.   Skin:  Negative for color change and rash.   Neurological:  Negative for seizures and syncope.   All other systems reviewed and are negative.      Patient Active Problem List   Diagnosis    Essential hypertension    Thrombocytopenia (HCC)    Vitamin D deficiency    Chronic tension-type headache, not intractable    Type 2 diabetes mellitus without complication, without long-term current use of insulin (HCC)    Type 2 diabetes mellitus with diabetic neuropathy, without long-term current use of insulin (HCC)    Mixed hyperlipidemia    Epithelial inclusion cyst    Class 2 severe obesity with body mass index (BMI) of 35 to 39.9 with serious comorbidity  (HCC)    Uncontrolled type 2 diabetes mellitus with hyperglycemia (HCC)    Acute bronchitis    Perirectal abscess    Aortic root dilation (HCC)      Past Medical History:   Diagnosis Date    Cyst of skin 04/29/2021    Behind right ear    Diabetes mellitus (HCC)     Epithelial inclusion cyst 7/7/2021      Past Surgical History:   Procedure Laterality Date    CYST REMOVAL Right 04/29/2021    Excision of skin cyst from behind right ear.  Office procedure      Family History   Problem Relation Age of Onset    Hypertension Father     Colon cancer Neg Hx     Colon polyps Neg Hx      Social History     Tobacco Use    Smoking status: Never    Smokeless tobacco: Never   Substance Use Topics    Alcohol use: Yes     Alcohol/week: 1.0 standard drink of alcohol     Types: 1 Standard drinks or equivalent per week     Comment: not much     No Known Allergies      Current Outpatient Medications:     Alcohol Swabs PADS, Use 4 (four) times a day, Disp: 100 each, Rfl: 99    Blood Glucose Monitoring Suppl (FreeStyle Lite) TAMMY, by Percutaneous route 2 (two) times a day, Disp: 1 each, Rfl: 0    Continuous Blood Gluc  (Dexcom G7 ) TAMMY, Use 1 each daily, Disp: 1 each, Rfl: 0    Continuous Blood Gluc Sensor (Dexcom G7 Sensor), Use 1 Device every 10 days, Disp: 3 each, Rfl: 3    glucose blood (FREESTYLE LITE) test strip, Use as instructed: Check blood glucose 4 times daily with meals and at bedtime, Disp: 400 strip, Rfl: 2    glucose monitoring kit (FREESTYLE) monitoring kit, 1 each by Does not apply route 2 (two) times a day before lunch and dinner E11.65, Disp: 1 each, Rfl: 0    insulin aspart, w/niacinamide, (Fiasp FlexTouch) 100 Units/mL injection pen, Inject 16 Units under the skin 3 (three) times a day with meals 5-15 min before eating, Disp: 44 mL, Rfl: 0    insulin degludec (Tresiba FlexTouch) 100 units/mL injection pen, Inject 36 Units under the skin daily at bedtime (Patient taking differently: Inject 36 Units  "under the skin daily at bedtime Pt states taking 40u), Disp: 33 mL, Rfl: 0    Insulin Pen Needle (PEN NEEDLES 29GX1/2\") 29G X 12MM MISC, Use 4 (four) times a day, Disp: 400 each, Rfl: 99    Lancets (freestyle) lancets, Use as instructed E11.65 testing twice a day, Disp: 100 each, Rfl: 0    lisinopril (ZESTRIL) 10 mg tablet, Take 1 tablet (10 mg total) by mouth daily Do not start before September 29, 2023., Disp: 30 tablet, Rfl: 0    metFORMIN (GLUCOPHAGE-XR) 500 mg 24 hr tablet, TAKE 1 TABLET BY MOUTH EVERY DAY WITH DINNER (Patient not taking: Reported on 12/19/2023), Disp: 90 tablet, Rfl: 1    sildenafil (VIAGRA) 50 MG tablet, Take 2 tablets (100 mg total) by mouth as needed for erectile dysfunction, Disp: 30 tablet, Rfl: 5      OBJECTIVE  Visit Vitals  Ht 5' 11\" (1.803 m)   Wt 114 kg (251 lb 9.6 oz)   BMI 35.09 kg/m²   Smoking Status Never   BSA 2.32 m²     Wt Readings from Last 3 Encounters:   01/10/24 113 kg (249 lb)   12/19/23 109 kg (241 lb)   11/02/23 109 kg (241 lb 3.2 oz)       Physical Exam  Constitutional:       General: He is not in acute distress.     Appearance: Normal appearance.   HENT:      Head: Normocephalic and atraumatic.      Mouth/Throat:      Mouth: Mucous membranes are moist.      Pharynx: Oropharynx is clear.   Eyes:      Extraocular Movements: Extraocular movements intact.      Conjunctiva/sclera: Conjunctivae normal.      Pupils: Pupils are equal, round, and reactive to light.   Cardiovascular:      Rate and Rhythm: Normal rate and regular rhythm.      Pulses:           Dorsalis pedis pulses are 2+ on the right side and 2+ on the left side.        Posterior tibial pulses are 2+ on the right side and 2+ on the left side.   Pulmonary:      Effort: Pulmonary effort is normal.      Breath sounds: Normal breath sounds.   Abdominal:      General: Abdomen is flat. There is no distension.      Palpations: Abdomen is soft.      Tenderness: There is no abdominal tenderness.   Musculoskeletal:       "   General: No swelling or deformity. Normal range of motion.      Cervical back: Normal range of motion and neck supple.   Feet:      Right foot:      Skin integrity: No ulcer, skin breakdown, erythema, warmth, callus or dry skin.      Left foot:      Skin integrity: No ulcer, skin breakdown, erythema, warmth, callus or dry skin.   Lymphadenopathy:      Cervical: No cervical adenopathy.   Skin:     General: Skin is warm and dry.   Neurological:      General: No focal deficit present.      Mental Status: He is alert and oriented to person, place, and time.   Psychiatric:         Mood and Affect: Mood normal.         Behavior: Behavior normal.       Labs:   Lab Results   Component Value Date    HGBA1C 7.6 (A) 01/10/2024    HGBA1C 10.9 (H) 09/26/2023    HGBA1C 7.1 (A) 07/18/2022     Lab Results   Component Value Date    GLUC 121 09/27/2023       Lab Results   Component Value Date    CREATININE 0.97 09/27/2023    CREATININE 0.99 09/25/2023    CREATININE 1.02 05/20/2020    BUN 13 09/27/2023    K 3.5 09/27/2023     09/27/2023    CO2 28 09/27/2023    EGFR 79 09/27/2023     Albumin Creat Ratio   Date Value Ref Range Status   10/18/2023 11 0 - 30 mg/g creatinine Final       Lab Results   Component Value Date    HDL 42 05/20/2020    TRIG 154 (H) 05/20/2020    CHOLHDL 5.5 (H) 10/22/2018       Lab Results   Component Value Date    ALT 14 09/25/2023    AST 16 09/25/2023    ALKPHOS 86 09/25/2023       Lab Results   Component Value Date    TSH 1.710 10/22/2018     Discussed with the patient and all questioned fully answered. He will call me if any problems arise.    Counseled patient on diagnostic results, prognosis, risk and benefit of treatment options, instruction for management, importance of treatment compliance, risk factor reduction, and impressions.

## 2024-02-08 NOTE — PATIENT INSTRUCTIONS
- Please start taking Metformin  mg daily with dinner  - Keep taking Tresiba 36 units at bedtime  - Take Fiasp 16 units around 10-15 minutes before each meal  - Please try to check your blood sugars at least 1-2 times a day as long as your work schedule allows and check your lunch glucose on days off if possible  - Please send this office a blood sugar log in 2 weeks with the doses of insulin you have been taking and I will call you to adjust your insulin doses if needed  - Please call the office if your blood sugars are consistently <70 or >300 at home

## 2024-03-04 DIAGNOSIS — E11.65 UNCONTROLLED TYPE 2 DIABETES MELLITUS WITH HYPERGLYCEMIA (HCC): ICD-10-CM

## 2024-03-04 RX ORDER — INSULIN DEGLUDEC INJECTION 100 U/ML
36 INJECTION, SOLUTION SUBCUTANEOUS
Qty: 33 ML | Refills: 1 | Status: SHIPPED | OUTPATIENT
Start: 2024-03-04

## 2024-03-11 DIAGNOSIS — E11.65 UNCONTROLLED TYPE 2 DIABETES MELLITUS WITH HYPERGLYCEMIA (HCC): ICD-10-CM

## 2024-03-11 RX ORDER — INSULIN ASPART INJECTION 100 [IU]/ML
16 INJECTION, SOLUTION SUBCUTANEOUS
Qty: 45 ML | Refills: 1 | Status: SHIPPED | OUTPATIENT
Start: 2024-03-11

## 2024-03-15 DIAGNOSIS — E11.9 TYPE 2 DIABETES MELLITUS WITHOUT COMPLICATION, WITHOUT LONG-TERM CURRENT USE OF INSULIN (HCC): ICD-10-CM

## 2024-03-15 RX ORDER — METFORMIN HYDROCHLORIDE 500 MG/1
TABLET, EXTENDED RELEASE ORAL
Qty: 90 TABLET | Refills: 1 | Status: SHIPPED | OUTPATIENT
Start: 2024-03-15

## 2024-03-29 ENCOUNTER — OFFICE VISIT (OUTPATIENT)
Dept: FAMILY MEDICINE CLINIC | Facility: CLINIC | Age: 69
End: 2024-03-29
Payer: MEDICARE

## 2024-03-29 VITALS
TEMPERATURE: 95.2 F | HEART RATE: 74 BPM | HEIGHT: 71 IN | SYSTOLIC BLOOD PRESSURE: 140 MMHG | DIASTOLIC BLOOD PRESSURE: 90 MMHG | OXYGEN SATURATION: 98 % | BODY MASS INDEX: 33.46 KG/M2 | WEIGHT: 239 LBS

## 2024-03-29 DIAGNOSIS — E11.40 TYPE 2 DIABETES MELLITUS WITH DIABETIC NEUROPATHY, WITHOUT LONG-TERM CURRENT USE OF INSULIN (HCC): ICD-10-CM

## 2024-03-29 DIAGNOSIS — E66.09 CLASS 1 OBESITY DUE TO EXCESS CALORIES WITH SERIOUS COMORBIDITY AND BODY MASS INDEX (BMI) OF 33.0 TO 33.9 IN ADULT: ICD-10-CM

## 2024-03-29 DIAGNOSIS — I10 ESSENTIAL HYPERTENSION: ICD-10-CM

## 2024-03-29 DIAGNOSIS — K57.92 DIVERTICULITIS: Primary | ICD-10-CM

## 2024-03-29 PROCEDURE — G2211 COMPLEX E/M VISIT ADD ON: HCPCS | Performed by: FAMILY MEDICINE

## 2024-03-29 PROCEDURE — 99214 OFFICE O/P EST MOD 30 MIN: CPT | Performed by: FAMILY MEDICINE

## 2024-03-29 RX ORDER — METRONIDAZOLE 500 MG/1
500 TABLET ORAL EVERY 8 HOURS SCHEDULED
Qty: 21 TABLET | Refills: 0 | Status: SHIPPED | OUTPATIENT
Start: 2024-03-29 | End: 2024-04-05

## 2024-03-29 RX ORDER — METRONIDAZOLE 500 MG/1
500 TABLET ORAL EVERY 8 HOURS SCHEDULED
Qty: 21 TABLET | Refills: 0 | Status: SHIPPED | OUTPATIENT
Start: 2024-03-29 | End: 2024-03-29 | Stop reason: ALTCHOICE

## 2024-03-29 RX ORDER — AMOXICILLIN AND CLAVULANATE POTASSIUM 875; 125 MG/1; MG/1
1 TABLET, FILM COATED ORAL EVERY 12 HOURS SCHEDULED
Qty: 14 TABLET | Refills: 0 | Status: SHIPPED | OUTPATIENT
Start: 2024-03-29 | End: 2024-04-05

## 2024-03-29 RX ORDER — CIPROFLOXACIN 500 MG/1
500 TABLET, FILM COATED ORAL EVERY 12 HOURS SCHEDULED
Qty: 14 TABLET | Refills: 0 | Status: SHIPPED | OUTPATIENT
Start: 2024-03-29 | End: 2024-04-05

## 2024-03-29 NOTE — PROGRESS NOTES
Assessment/Plan:      1. Diverticulitis  Assessment & Plan:  Start augmentin twice a day and flagyl 3 times a day. Avoid alcohol and make sure symptoms are improving.   To ED if fever, symptoms worsening, blood in bms.     Orders:  -     ciprofloxacin (CIPRO) 500 mg tablet; Take 1 tablet (500 mg total) by mouth every 12 (twelve) hours for 7 days  -     amoxicillin-clavulanate (AUGMENTIN) 875-125 mg per tablet; Take 1 tablet by mouth every 12 (twelve) hours for 7 days  -     metroNIDAZOLE (FLAGYL) 500 mg tablet; Take 1 tablet (500 mg total) by mouth every 8 (eight) hours for 7 days    2. Essential hypertension  Assessment & Plan:  Adequate control on current medicine, has left sided abdominal pain, will continue to monitor.       3. Type 2 diabetes mellitus with diabetic neuropathy, without long-term current use of insulin (HCC)  Assessment & Plan:    Lab Results   Component Value Date    HGBA1C 7.6 (A) 01/10/2024   Next hba1c due after 4/10. Tresiba 36 units and 10 uints of short acting with meals. Due for diabetic eye exam      4. Class 1 obesity due to excess calories with serious comorbidity and body mass index (BMI) of 33.0 to 33.9 in adult          Subjective:  Chief Complaint   Patient presents with    Abdominal Pain     Sunday some stomach cramps began that go down to his testicles. Going to bathroom fine. Monday-Thursday late at night he gets a fever. Appetite has been off. Has been eating  DM Eye- Due, hasn't scheduled.        Patient ID: Syed Reese is a 69 y.o. male.    Cramping in abdomen, started on Sunday 6pm before getting on plane in [place] to return to [place]- Took 2 advil - and was able to sleep on the plane  Had Radiating pain into both testicles- more left side than right - no bulges, no changes in bms.    Monday - low grade fever, Thursday, pains localized to left side.  No nausea, no vomiting, decreased appetite.        Abdominal Pain  Pertinent negatives include no constipation, diarrhea, fever  "or nausea.       Review of Systems   Constitutional: Negative.  Negative for fatigue and fever.   HENT: Negative.     Eyes: Negative.    Respiratory: Negative.  Negative for cough.    Cardiovascular: Negative.    Gastrointestinal:  Positive for abdominal pain. Negative for blood in stool, constipation, diarrhea and nausea.   Endocrine: Negative.    Genitourinary: Negative.    Musculoskeletal: Negative.    Skin: Negative.    Allergic/Immunologic: Negative.    Neurological: Negative.    Psychiatric/Behavioral: Negative.           The following portions of the patient's history were reviewed and updated as appropriate: allergies, current medications, past family history, past medical history, past social history, past surgical history and problem list.    Objective:  Vitals:    03/29/24 1554 03/29/24 1615   BP:  140/90   Pulse: 74    Temp: (!) 95.2 °F (35.1 °C)    TempSrc: Tympanic    SpO2: 98%    Weight: 108 kg (239 lb)    Height: 5' 11\" (1.803 m)       Physical Exam  Vitals and nursing note reviewed.   Constitutional:       Appearance: He is well-developed.   HENT:      Head: Normocephalic and atraumatic.   Cardiovascular:      Rate and Rhythm: Normal rate and regular rhythm.      Heart sounds: Normal heart sounds.   Pulmonary:      Effort: Pulmonary effort is normal.      Breath sounds: Normal breath sounds.   Abdominal:      General: Bowel sounds are normal.      Palpations: Abdomen is soft.      Tenderness: There is abdominal tenderness in the left lower quadrant.      Hernia: There is no hernia in the left inguinal area or right femoral area.   Skin:     General: Skin is warm and dry.   Neurological:      Mental Status: He is alert and oriented to person, place, and time.   Psychiatric:         Behavior: Behavior normal.         Thought Content: Thought content normal.         Judgment: Judgment normal.         "

## 2024-03-29 NOTE — PATIENT INSTRUCTIONS
Augmentin  1 tab twice a day  Metronidazole 500mg 1 tab 3 times a day  Light diet   Fluids  Call if fever, increased pain, nausea, vomiting    Diverticulitis   AMBULATORY CARE:   Diverticulitis  is a condition that causes small pockets along your intestine called diverticula to become inflamed or infected. This is caused by hard bowel movement, food, or bacteria that get stuck in the pockets.       Signs and symptoms include the following:   Pain in the lower left side of your abdomen    Fever and chills    Nausea or vomiting    Constipation or diarrhea    An urge to urinate or have a bowel movement more often than usual    Bloody bowel movements    Bloating and gas    Seek care immediately if:   You have bowel movement or foul-smelling discharge leaking from your vagina or in your urine.    You have severe diarrhea.    You urinate less than usual or not at all.    You are not able to have a bowel movement.    You cannot stop vomiting.    You have cramps or severe abdominal pain and a fever.    You have new or increased blood in your bowel movements.    Call your doctor if:   You have pain when you urinate.    Your symptoms get worse or do not go away.    You have questions or concerns about your condition or care.    Treatment:  Mild diverticulitis can be treated at home. You may need to rest and follow a clear liquid diet until your diverticulitis gets better. You will be admitted to the hospital if you have severe diverticulitis. You may need any of the following:  Antibiotics  help treat or prevent a bacterial infection.    Prescription pain medicine  may be given. Ask your healthcare provider how to take this medicine safely. Some prescription pain medicines contain acetaminophen. Do not take other medicines that contain acetaminophen without talking to your healthcare provider. Too much acetaminophen may cause liver damage. Prescription pain medicine may cause constipation. Ask your healthcare provider how to  prevent or treat constipation.     An IV  may be used to give you liquids and nutrition. You may not be able to eat or drink anything until your healthcare provider says it is okay.    Drainage  may be done to reduce inflammation or treat infection. Your healthcare provider may insert a small tube through an incision in your abdomen to drain pus from infected diverticula.    Surgery  may be needed if there is a hole in your bowel or a large amount of swelling. A healthcare provider will remove the infected or inflamed areas of your colon.    Clear liquid diet:  A clear liquid diet includes any liquids that you can see through. Examples include water, ginger-keiko, cranberry or apple juice, frozen fruit ice, or broth. Stay on a clear liquid diet until your symptoms are gone, or as directed.  Follow up with your doctor as directed:  You may need to return for a colonoscopy. When your symptoms are gone, you may need a low-fat, high-fiber diet to prevent diverticulitis from developing again. Your healthcare provider or dietitian can help you create meal plans. Write down your questions so you remember to ask them during your visits.  © Copyright [employer] 2023 Information is for End User's use only and may not be sold, redistributed or otherwise used for commercial purposes.  The above information is an  only. It is not intended as medical advice for individual conditions or treatments. Talk to your doctor, nurse or pharmacist before following any medical regimen to see if it is safe and effective for you.     Complex Repair And Skin Substitute Graft Text: The defect edges were debeveled with a #15 scalpel blade.  The primary defect was closed partially with a complex linear closure.  Given the location of the remaining defect, shape of the defect and the proximity to free margins a skin substitute graft was deemed most appropriate to repair the remaining defect.  The graft was trimmed to fit the size of the remaining defect.  The graft was then placed in the primary defect, oriented appropriately, and sutured into place.

## 2024-03-31 PROBLEM — J20.9 ACUTE BRONCHITIS: Status: RESOLVED | Noted: 2022-12-08 | Resolved: 2024-03-31

## 2024-03-31 PROBLEM — K57.92 DIVERTICULITIS: Status: ACTIVE | Noted: 2024-03-31

## 2024-03-31 PROBLEM — E66.811 CLASS 1 OBESITY DUE TO EXCESS CALORIES WITH SERIOUS COMORBIDITY AND BODY MASS INDEX (BMI) OF 33.0 TO 33.9 IN ADULT: Status: ACTIVE | Noted: 2021-07-07

## 2024-03-31 PROBLEM — E66.09 CLASS 1 OBESITY DUE TO EXCESS CALORIES WITH SERIOUS COMORBIDITY AND BODY MASS INDEX (BMI) OF 33.0 TO 33.9 IN ADULT: Status: ACTIVE | Noted: 2021-07-07

## 2024-04-01 NOTE — ASSESSMENT & PLAN NOTE
Start augmentin twice a day and flagyl 3 times a day. Avoid alcohol and make sure symptoms are improving.   To ED if fever, symptoms worsening, blood in bms.

## 2024-04-01 NOTE — ASSESSMENT & PLAN NOTE
Lab Results   Component Value Date    HGBA1C 7.6 (A) 01/10/2024   Next hba1c due after 4/10. Tresiba 36 units and 10 uints of short acting with meals. Due for diabetic eye exam

## 2024-06-04 ENCOUNTER — NURSE TRIAGE (OUTPATIENT)
Age: 69
End: 2024-06-04

## 2024-06-04 NOTE — TELEPHONE ENCOUNTER
"Reason for Disposition  • MODERATE-SEVERE rectal pain (i.e., interferes with school, work, or sleep)    Answer Assessment - Initial Assessment Questions  1. SYMPTOM:  \"What's the main symptom you're concerned about?\" (e.g., pain, itching, swelling, rash)      Abscess, pain, swelling, redness     5. CONSTIPATION: \"Do you have constipation?\" If Yes, ask: \"How bad is it?\"      Denies     7. OTHER SYMPTOMS: \"Do you have any other symptoms?\"  (e.g., rectal bleeding, abdominal pain, vomiting, fever)      Denies but pt has previous abscess that was drained in the past, that one is draining fluid. There is also a new abscess that has not been draining fluid yet.    Protocols used: Rectal Symptoms-ADULT-[place]    "

## 2024-06-04 NOTE — TELEPHONE ENCOUNTER
Pts cristine vallejo called to schedule pt for appt d/t new and old perirectal abscess. Pt has old abscess that was drained several months ago however it is leaking puss again. There is also a new abscess near old abscess and the area is swollen, red, and painful. Pt denies constipation or pain with bm. Pt denies fever.  Pt requests urgent appt. And agreeable to any provider.     Pt scheduled for OV

## 2024-06-05 ENCOUNTER — OFFICE VISIT (OUTPATIENT)
Age: 69
End: 2024-06-05
Payer: MEDICARE

## 2024-06-05 VITALS
WEIGHT: 237 LBS | HEIGHT: 71 IN | BODY MASS INDEX: 33.18 KG/M2 | DIASTOLIC BLOOD PRESSURE: 82 MMHG | SYSTOLIC BLOOD PRESSURE: 142 MMHG

## 2024-06-05 DIAGNOSIS — L02.91 ABSCESS: Primary | ICD-10-CM

## 2024-06-05 PROCEDURE — 46600 DIAGNOSTIC ANOSCOPY SPX: CPT | Performed by: SURGERY

## 2024-06-05 PROCEDURE — 99203 OFFICE O/P NEW LOW 30 MIN: CPT | Performed by: SURGERY

## 2024-06-05 NOTE — PATIENT INSTRUCTIONS
Call Dr. Marshall's office if fevers >101.4, excruciating pain, increased swelling. Come to ER if pain or fevers are severe.    Return to office after MRI

## 2024-06-05 NOTE — PROGRESS NOTES
Ambulatory Visit  Name: Syed Reese      : 1955      MRN: [id number]  Encounter Provider: India Marshall MD  Encounter Date: 2024   Encounter department: [facility] COLON AND RECTAL SURGERY [place]    Assessment & Plan   1. 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 male who was referred by Blaise Krishnamurthy DO for evaluation of perianal abscess.  The patient reports that around 8 months ago, he was hospitalized and required incision and drainage of a left-sided perianal abscess.  He recalls that about 2 months after, he experienced painless liquid drainage from the same area.  More recently, around 2 to 3 weeks ago, he experienced swelling on the right.  He denies pain, fevers, drainage, and bleeding.  He is accompanied by his wife today.    Last Colonoscopy was done 21 By Miguel Lazcano. With a 5 year recall.                                                                           IMPRESSION                          Mild sigmoid diverticulosis                       Grade 1 internal hemorrhoids                       Adequate but less than optimal prep          Review of Systems   Constitutional:  Negative for chills and fever.   HENT:  Negative for ear pain and sore throat.    Eyes:  Negative for pain and visual disturbance.  "  Respiratory:  Negative for cough and shortness of breath.    Cardiovascular:  Negative for chest pain and palpitations.   Gastrointestinal:  Negative for abdominal pain and vomiting.        Perianal swelling   Genitourinary:  Negative for dysuria and hematuria.   Musculoskeletal:  Negative for arthralgias and back pain.   Skin:  Negative for color change and rash.   Neurological:  Negative for seizures and syncope.   All other systems reviewed and are negative.    Past Medical History   Past Medical History:   Diagnosis Date    Cyst of skin 04/29/2021    Behind right ear    Diabetes mellitus (HCC)     Epithelial inclusion cyst 7/7/2021     Past Surgical History:   Procedure Laterality Date    CYST REMOVAL Right 04/29/2021    Excision of skin cyst from behind right ear.  Office procedure     Family History   Problem Relation Age of Onset    Hypertension Father     Colon cancer Neg Hx     Colon polyps Neg Hx      Current Outpatient Medications on File Prior to Visit   Medication Sig Dispense Refill    Alcohol Swabs PADS Use 4 (four) times a day 100 each 99    Blood Glucose Monitoring Suppl (FreeStyle Lite) TAMMY by Percutaneous route 2 (two) times a day 1 each 0    glucose monitoring kit (FREESTYLE) monitoring kit 1 each by Does not apply route 2 (two) times a day before lunch and dinner E11.65 1 each 0    insulin aspart, w/niacinamide, (Fiasp FlexTouch) 100 Units/mL injection pen Inject 16 Units under the skin 3 (three) times a day with meals 5-15 min before eating 45 mL 1    insulin degludec (Tresiba FlexTouch) 100 units/mL injection pen INJECT 36 UNITS UNDER THE SKIN DAILY AT BEDTIME 33 mL 1    Insulin Pen Needle (PEN NEEDLES 29GX1/2\") 29G X 12MM MISC Use 4 (four) times a day 400 each 99    Lancets (freestyle) lancets Use as instructed E11.65 testing twice a day 100 each 0     No current facility-administered medications on file prior to visit.   No Known Allergies   Objective     /82   Ht 5' 11\" (1.803 m)  "  Wt 108 kg (237 lb)   BMI 33.05 kg/m²     Physical Exam  Vitals and nursing note reviewed.   Constitutional:       General: He is not in acute distress.     Appearance: He is well-developed.   HENT:      Head: Normocephalic and atraumatic.   Eyes:      Conjunctiva/sclera: Conjunctivae normal.   Cardiovascular:      Rate and Rhythm: Normal rate and regular rhythm.      Heart sounds: No murmur heard.  Pulmonary:      Effort: Pulmonary effort is normal. No respiratory distress.      Breath sounds: Normal breath sounds.   Abdominal:      Palpations: Abdomen is soft.      Tenderness: There is no abdominal tenderness.   Genitourinary:     Comments: Right posterior perianal induration  1 cm left posterior wound with epithelialization  Mild posterior midline tenderness on digital anorectal exam  Hypertrophic anal papilla at posterior midline on anoscopy without obvious internal opening  Musculoskeletal:         General: No swelling.      Cervical back: Neck supple.   Skin:     General: Skin is warm and dry.      Capillary Refill: Capillary refill takes less than 2 seconds.   Neurological:      Mental Status: He is alert.   Psychiatric:         Mood and Affect: Mood normal.     Lower Endoscopy    Date/Time: 6/5/2024 8:00 AM    Performed by: India Marshall MD  Authorized by: India Marshall MD    Verbal consent obtained?: Yes    Risks and benefits: Risks, benefits and alternatives were discussed    Consent given by:  Patient  Patient identity confirmed:  Verbally with patient and provided demographic data  Time out: Immediately prior to the procedure a time out was called    Patient sedated: No    Scope type:  Anoscope  External exam performed: Yes    Digital exam performed: Yes    Internal hemorrhoids: Yes    Inflammation: posterior midline hypertrophic anal papilla.        Administrative Statements   I have spent a total time of 30 minutes on 06/05/24 In caring for this patient including Diagnostic  results, Risks and benefits of tx options, Instructions for management, Patient and family education, Importance of tx compliance, Risk factor reductions, Reviewing / ordering tests, medicine, procedures  , Obtaining or reviewing history  , and Communicating with other healthcare professionals .

## 2024-06-06 ENCOUNTER — NURSE TRIAGE (OUTPATIENT)
Age: 69
End: 2024-06-06

## 2024-06-06 NOTE — TELEPHONE ENCOUNTER
Returned patient's phone call.  Informed patient that Dr. Marshall could see him in the office 6/7/24 at 2:20 pm in the [facility] office.  Patient stated he was going for MRI at 6:15 a.m. that day and did not want to come in for office appointment if dr was just going to look at his butt.  He expressed that he wanted to set up surgery.  I explained to patient that he should have the MRI done and then after the dr reviews that, we would schedule surgery.  Patient only wanted to come in for visit if the surgery was going to be done. He asked if Dr Marshall wasn't available if another dr could do it 6/7/24.    I told him I did not have any availability with other drs.  He said he would go to ER.

## 2024-06-06 NOTE — TELEPHONE ENCOUNTER
Patient was seen in the office on 6/5/2024. Patient has MRI scheduled for Monday at the [facility]. Per patient's partner he is having some slight drainage today, but he is feeling increased pressure/discomfort.  The abscess area has some redness and is slightly warm to the touch.  Patient does not have any fevers.  Patient will try warm tub soaks and warm compress to help with the discomfort, tylenol and ibuprofen can be used to help with pain.  If patient's pain level increases or he has a fever he will report to the ER.

## 2024-06-07 ENCOUNTER — HOSPITAL ENCOUNTER (OUTPATIENT)
Dept: MRI IMAGING | Facility: HOSPITAL | Age: 69
Discharge: HOME | End: 2024-06-07
Attending: SURGERY
Payer: MEDICARE

## 2024-06-07 DIAGNOSIS — L02.91 ABSCESS: ICD-10-CM

## 2024-06-07 PROCEDURE — 72195 MRI PELVIS W/O DYE: CPT

## 2024-06-10 ENCOUNTER — TELEPHONE (OUTPATIENT)
Age: 69
End: 2024-06-10

## 2024-06-10 NOTE — TELEPHONE ENCOUNTER
Patients GI provider:  Dr. Marshall  Number to return call: [phone] & [phone]    Reason for call: Pts wife Nato calling to talk to Dr. Marshall or with Dr. Mckeon to discuss further for his MRI results and next step for the procedure. Please contact pt at the above ph # stated they are going out so he need appt before this Fri.    Scheduled procedure/appointment date if applicable: Apt/procedure None

## 2024-06-10 NOTE — TELEPHONE ENCOUNTER
Patient's wife called he had MRI on 6/7.  He continues to have minimal drainage per abscess.  He is leaving the country on vacation on Thurdsday and wanted to be seen prior to leaving for vacation. I was able to schedule him with Dr. Marshall on 6/11 and patient is agreeable to this.

## 2024-06-11 ENCOUNTER — OFFICE VISIT (OUTPATIENT)
Age: 69
End: 2024-06-11
Payer: MEDICARE

## 2024-06-11 VITALS
WEIGHT: 240 LBS | HEIGHT: 71 IN | SYSTOLIC BLOOD PRESSURE: 160 MMHG | DIASTOLIC BLOOD PRESSURE: 90 MMHG | BODY MASS INDEX: 33.6 KG/M2

## 2024-06-11 DIAGNOSIS — L02.91 ABSCESS: Primary | ICD-10-CM

## 2024-06-11 PROCEDURE — 99213 OFFICE O/P EST LOW 20 MIN: CPT | Performed by: SURGERY

## 2024-06-11 RX ORDER — AMOXICILLIN AND CLAVULANATE POTASSIUM 875; 125 MG/1; MG/1
1 TABLET, FILM COATED ORAL EVERY 12 HOURS SCHEDULED
Qty: 10 TABLET | Refills: 0 | Status: SHIPPED | OUTPATIENT
Start: 2024-06-11 | End: 2024-06-16

## 2024-06-11 NOTE — PROGRESS NOTES
Ambulatory Visit  Name: Syed Reese      : 1955      MRN: [id number]  Encounter Provider: India Marshall MD  Encounter Date: 2024   Encounter department: [facility] COLON AND RECTAL SURGERY [place]    Assessment & Plan   1. 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 male who presents with the complaint of abscess draining. He was last in the office on 24.     Upon physical examination a deep postanal horseshoe abscess was suspected. Surgical intervention was discussed. MRI ordered last office.    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 The tracts appear active (high signal intensity on fluid sensitive sequences), but evaluation is suboptimal due to lack of IV contrast.    The patient continues to have draining from the area. He notes continued swelling, pain, and discomfort since the last visit.  The patient reports that he has felt hot and may  "be running a fever.     Review of Systems   Constitutional:  Negative for chills and fever.   HENT:  Negative for ear pain and sore throat.    Eyes:  Negative for pain and visual disturbance.   Respiratory:  Negative for cough and shortness of breath.    Cardiovascular:  Negative for chest pain and palpitations.   Gastrointestinal:  Negative for abdominal pain and vomiting.   Genitourinary:  Negative for dysuria and hematuria.   Musculoskeletal:  Negative for arthralgias and back pain.   Skin:  Negative for color change and rash.   Neurological:  Negative for seizures and syncope.   All other systems reviewed and are negative.      Objective     /90   Ht 5' 11\" (1.803 m)   Wt 109 kg (240 lb)   BMI 33.47 kg/m²     Physical Exam  Vitals and nursing note reviewed.   Constitutional:       General: He is not in acute distress.     Appearance: He is well-developed.   HENT:      Head: Normocephalic and atraumatic.   Eyes:      Conjunctiva/sclera: Conjunctivae normal.   Cardiovascular:      Rate and Rhythm: Normal rate and regular rhythm.      Heart sounds: No murmur heard.  Pulmonary:      Effort: Pulmonary effort is normal. No respiratory distress.      Breath sounds: Normal breath sounds.   Abdominal:      Palpations: Abdomen is soft.      Tenderness: There is no abdominal tenderness.   Musculoskeletal:         General: No swelling.      Cervical back: Neck supple.   Skin:     General: Skin is warm and dry.      Capillary Refill: Capillary refill takes less than 2 seconds.   Neurological:      Mental Status: He is alert.   Psychiatric:         Mood and Affect: Mood normal.       Administrative Statements   I have spent a total time of 20 minutes on 6/11/2024 In caring for this patient including Diagnostic results, Prognosis, Risks and benefits of tx options, Instructions for management, Patient and family education, Importance of tx compliance, Risk factor reductions, Impressions, Counseling / Coordination of " care, Documenting in the medical record, Reviewing / ordering tests, medicine, procedures  , Obtaining or reviewing history  , and Communicating with other healthcare professionals .

## 2024-06-17 ENCOUNTER — TELEPHONE (OUTPATIENT)
Age: 69
End: 2024-06-17

## 2024-06-17 NOTE — TELEPHONE ENCOUNTER
Lea from Colorectal Surgery will be faxing form over in need of provider signature.    Patient undergoing procedure on Thursday.    Please review.  Thank You.

## 2024-06-17 NOTE — LETTER
June 19, 2024     Patient: Syed Reese  YOB: 1955  Date of Visit: 6/17/2024      To Whom it May Concern:    Syed eRese is under my professional care. Syed needs to hold his meal time insulin (Fiasp FlexTouch) 100 Units/mL injection pen day of surgery , then resume when eating. Take   If you have any questions or concerns, please don't hesitate to call.         Sincerely,          Yasir Kenny        CC:   No Recipients

## 2024-06-18 NOTE — TELEPHONE ENCOUNTER
Lea from Colorectal Surgery calling to see if we received the fax she sent over yesterday. Noted there was nothing in chart yet. Called office clinical line for further advisement. No answer. Called office clerical line. No answer.     Please f/u with Lea as this is time sensitive because she needs answers and surgery is scheduled for Thursday. Call back number is [phone].

## 2024-06-19 ENCOUNTER — TELEPHONE (OUTPATIENT)
Dept: FAMILY MEDICINE CLINIC | Facility: CLINIC | Age: 69
End: 2024-06-19

## 2024-06-19 NOTE — TELEPHONE ENCOUNTER
Fax received from [facility] .  (Diabetic Medicine Instructions Form, requesting Pre-op diabetic medicine instructions.       Letter complete    Syed Reese is under my professional care. Syed needs to hold his meal time insulin (Fiasp FlexTouch) 100 Units/mL injection pen day of surgery , then resume when eating. Take (Tresiba FlexTouch) 100 units/mL injection pen 36 Units at bed time the night before surgery.          is out of the office for the week and Geovanna Price review and advised     Letter completed and faxed. Confirmation received

## 2024-06-19 NOTE — TELEPHONE ENCOUNTER
Lea from Colorectal Surgery called stating she didn't receive the fax. Relayed the message it was faxed over this morning at 7:55 per the last message. Lea was going to give it some time and then she will call back if she still doesn't have it.

## 2024-06-19 NOTE — LETTER
June 19, 2024     Patient: Syed Reese  YOB: 1955        To Whom it May Concern:    Syed Reese is under my professional care. Syed needs to hold his meal time insulin (Fiasp FlexTouch) 100 Units/mL injection pen day of surgery , then resume when eating post operatively. Take (Tresiba FlexTouch) 100 units/mL injection pen 36 Units at bed time the night before surgery.       If you have any questions or concerns, please don't hesitate to call.         Sincerely,      YUN Mitchell

## 2024-07-06 DIAGNOSIS — E11.65 UNCONTROLLED TYPE 2 DIABETES MELLITUS WITH HYPERGLYCEMIA (HCC): ICD-10-CM

## 2024-07-08 RX ORDER — INSULIN LISPRO 100 [IU]/ML
16 INJECTION, SOLUTION INTRAVENOUS; SUBCUTANEOUS
Qty: 15 ML | Refills: 1 | Status: SHIPPED | OUTPATIENT
Start: 2024-07-08

## 2024-09-19 DIAGNOSIS — E11.65 UNCONTROLLED TYPE 2 DIABETES MELLITUS WITH HYPERGLYCEMIA (HCC): ICD-10-CM

## 2024-09-19 RX ORDER — INSULIN LISPRO 100 [IU]/ML
INJECTION, SOLUTION INTRAVENOUS; SUBCUTANEOUS
Qty: 15 ML | Refills: 5 | Status: SHIPPED | OUTPATIENT
Start: 2024-09-19

## 2024-10-28 ENCOUNTER — OFFICE VISIT (OUTPATIENT)
Dept: FAMILY MEDICINE CLINIC | Facility: CLINIC | Age: 69
End: 2024-10-28
Payer: MEDICARE

## 2024-10-28 VITALS
SYSTOLIC BLOOD PRESSURE: 150 MMHG | HEART RATE: 85 BPM | OXYGEN SATURATION: 96 % | HEIGHT: 71 IN | TEMPERATURE: 96.9 F | BODY MASS INDEX: 33.88 KG/M2 | DIASTOLIC BLOOD PRESSURE: 100 MMHG | WEIGHT: 242 LBS

## 2024-10-28 DIAGNOSIS — E11.40 TYPE 2 DIABETES MELLITUS WITH DIABETIC NEUROPATHY, WITHOUT LONG-TERM CURRENT USE OF INSULIN (HCC): ICD-10-CM

## 2024-10-28 DIAGNOSIS — I10 ESSENTIAL HYPERTENSION: ICD-10-CM

## 2024-10-28 DIAGNOSIS — Z12.5 PROSTATE CANCER SCREENING: ICD-10-CM

## 2024-10-28 DIAGNOSIS — Z00.00 MEDICARE ANNUAL WELLNESS VISIT, SUBSEQUENT: Primary | ICD-10-CM

## 2024-10-28 DIAGNOSIS — R35.0 INCREASED URINARY FREQUENCY: ICD-10-CM

## 2024-10-28 LAB — SL AMB POCT HEMOGLOBIN AIC: 9.5 (ref ?–6.5)

## 2024-10-28 PROCEDURE — 83036 HEMOGLOBIN GLYCOSYLATED A1C: CPT

## 2024-10-28 PROCEDURE — G0439 PPPS, SUBSEQ VISIT: HCPCS

## 2024-10-28 PROCEDURE — 99213 OFFICE O/P EST LOW 20 MIN: CPT

## 2024-10-28 RX ORDER — SILDENAFIL 50 MG/1
TABLET, FILM COATED ORAL
COMMUNITY
Start: 2024-09-26

## 2024-10-28 NOTE — PROGRESS NOTES
Ambulatory Visit  Name: Syed Reese      : 1955      MRN: [id number]  Encounter Provider: Heaven Ardon DO  Encounter Date: 10/28/2024   Encounter department: [facility] (formerly [facility])    Assessment & Plan  Medicare annual wellness visit, subsequent  -colorectal ca screening: UTD, last colonoscopy 2021, due for repeat in 5 years due to personal hx of polyps, due 2026  -due for annual labwork, will order today  -immunizations: Tdap UTD, due for pneumococcal patient defers       Type 2 diabetes mellitus with diabetic neuropathy, without long-term current use of insulin ([facility])    Lab Results   Component Value Date    HGBA1C 9.5 (A) 10/28/2024   -uncontrolled, A1c is 9.5 today, up from 7.6 in 2024. Pt admits he does not always comply with his insulin regimen and frequently skips doses when he gets busy with work  -current regimen: lispro 16u TID with meals, tresiba 36u qhs.     Plan:  -counseled extensively on long term health risks associated with uncontrolled diabetes including: CAD, MI, Stroke, PVD, kidney disease, eye disease, neuropathy, infection, limb loss   -discussed reestablishing with endocrinology, pt defers   -discussed transitioning to an oral regimen, pt defers, states taking the insulin doesn't bother him, he just doesn't do it regularly like he is supposed to  -pt is agreeable to take insulin daily as prescribed  -will check labwork today, microalb/cr ratio. Return for follow up/repeat A1c in 3 months    Orders:    POCT hemoglobin A1c    TSH, 3rd generation with Free T4 reflex; Future    Lipid panel; Future    Comprehensive metabolic panel; Future    CBC and Platelet; Future    Albumin / creatinine urine ratio; Future    Essential hypertension  -mildly elevated today, pt reports due to work stress. Declines repeat.  Orders:    TSH, 3rd generation with Free T4 reflex; Future    Lipid panel; Future    Comprehensive metabolic panel; Future    CBC and Platelet; Future     "Albumin / creatinine urine ratio; Future    Prostate cancer screening         Increased urinary frequency    Orders:    PSA, total and free; Future       Preventive health issues were discussed with patient, and age appropriate screening tests were ordered as noted in patient's After Visit Summary. Personalized health advice and appropriate referrals for health education or preventive services given if needed, as noted in patient's After Visit Summary.    History of Present Illness     Pt presents for MAW visit.     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 Reports he takes his insulin \"maybe a third of the time.\" States he does not have any financial or psychological barriers to taking it, he just doesn't. States injecting it does not bother him and he is not interested in transitioning to an oral regimen. He has not followed with endocrinology recently and does not want to.    States he otherwise feels well, denies any complaints or concerns.      Patient Care Team:  Blaise Krishnamurthy,  as PCP - General  Fortino Ribeiro,  as PCP - Endocrinology (Endocrinology)  Katelynn Mcdaniel DO as Fellow (Endocrinology)  Marilee Fabian RD as Diabetes Educator (Dietician)    Review of Systems   Constitutional:  Negative for chills and fever.   HENT:  Negative for ear pain and sore throat.    Eyes:  Negative for pain and visual disturbance.   Respiratory:  Negative for cough and shortness of breath.    Cardiovascular:  Negative for chest pain and palpitations.   Gastrointestinal:  Negative for abdominal pain and vomiting.   Genitourinary:  Negative for dysuria and hematuria.   Musculoskeletal:  Negative for arthralgias and back pain.   Skin:  Negative for color change and rash.   Neurological:  Negative for seizures and syncope.   All other systems reviewed and are negative.  Medical History Reviewed by provider this encounter:       Annual Wellness Visit Questionnaire "   Last Medicare Wellness visit information reviewed, patient interviewed and updates made to the record today.      Health Risk Assessment:   Patient rates overall health as very good. Patient feels that their physical health rating is slightly better. Patient is satisfied with their life. Eyesight was rated as same. Hearing was rated as same. Patient feels that their emotional and mental health rating is same. Patients states they are never, rarely angry. Patient states they are never, rarely unusually tired/fatigued. Pain experienced in the last 7 days has been none. Patient states that he has experienced no weight loss or gain in last 6 months.     Depression Screening:   PHQ-2 Score: 0  PHQ-9 Score: 0      Fall Risk Screening:   In the past year, patient has experienced: no history of falling in past year      Home Safety:  Patient does not have trouble with stairs inside or outside of their home. Patient has working smoke alarms and has working carbon monoxide detector. Home safety hazards include: none.     Nutrition:   Current diet is Diabetic.     Medications:   Patient is not currently taking any over-the-counter supplements. Patient is able to manage medications.     Activities of Daily Living (ADLs)/Instrumental Activities of Daily Living (IADLs):   Walk and transfer into and out of bed and chair?: Yes  Dress and groom yourself?: Yes    Bathe or shower yourself?: Yes    Feed yourself? Yes  Do your laundry/housekeeping?: Yes  Manage your money, pay your bills and track your expenses?: Yes  Make your own meals?: Yes    Do your own shopping?: Yes    Previous Hospitalizations:   Any hospitalizations or ED visits within the last 12 months?: No      PREVENTIVE SCREENINGS      Cardiovascular Screening:    General: Screening Not Indicated and History Lipid Disorder      Diabetes Screening:     General: Screening Not Indicated and History Diabetes      Colorectal Cancer Screening:     General: Screening  "Current      Abdominal Aortic Aneurysm (AAA) Screening:    Risk factors include: age between 65-74 yo        Lung Cancer Screening:     General: Screening Not Indicated      Hepatitis C Screening:    General: Screening Current    Screening, Brief Intervention, and Referral to Treatment (SBIRT)    Screening  Typical number of drinks in a day: 0  Typical number of drinks in a week: 4  Interpretation: Low risk drinking behavior.    Social Determinants of Health     Financial Resource Strain: Low Risk  (10/18/2023)    Overall Financial Resource Strain (CARDIA)     Difficulty of Paying Living Expenses: Not hard at all   Food Insecurity: No Food Insecurity (9/26/2023)    Hunger Vital Sign     Worried About Running Out of Food in the Last Year: Never true     Ran Out of Food in the Last Year: Never true   Transportation Needs: No Transportation Needs (10/18/2023)    PRAPARE - Transportation     Lack of Transportation (Medical): No     Lack of Transportation (Non-Medical): No   Housing Stability: Low Risk  (9/26/2023)    Housing Stability Vital Sign     Unable to Pay for Housing in the Last Year: No     Number of Times Moved in the Last Year: 1     Homeless in the Last Year: No     No results found.    Objective     /100 (BP Location: Right arm, Patient Position: Sitting, Cuff Size: Adult)   Pulse 85   Temp (!) 96.9 °F (36.1 °C) (Tympanic)   Ht 5' 10.5\" (1.791 m)   Wt 110 kg (242 lb)   SpO2 96%   BMI 34.23 kg/m²     Physical Exam  Vitals and nursing note reviewed.   Constitutional:       General: He is not in acute distress.     Appearance: Normal appearance. He is well-developed. He is obese. He is not ill-appearing or toxic-appearing.   HENT:      Head: Normocephalic and atraumatic.      Right Ear: Tympanic membrane, ear canal and external ear normal.      Left Ear: Tympanic membrane, ear canal and external ear normal.      Nose: Nose normal.      Mouth/Throat:      Mouth: Mucous membranes are moist.      " Pharynx: Oropharynx is clear. No oropharyngeal exudate or posterior oropharyngeal erythema.   Eyes:      Extraocular Movements: Extraocular movements intact.      Conjunctiva/sclera: Conjunctivae normal.      Pupils: Pupils are equal, round, and reactive to light.   Cardiovascular:      Rate and Rhythm: Normal rate and regular rhythm.      Heart sounds: Normal heart sounds. No murmur heard.  Pulmonary:      Effort: Pulmonary effort is normal. No respiratory distress.      Breath sounds: Normal breath sounds. No wheezing, rhonchi or rales.   Abdominal:      General: Bowel sounds are normal. There is no distension.      Palpations: Abdomen is soft.      Tenderness: There is no abdominal tenderness. There is no guarding or rebound.   Musculoskeletal:         General: No swelling. Normal range of motion.      Cervical back: Normal range of motion and neck supple. No rigidity or tenderness.      Right lower leg: No edema.      Left lower leg: No edema.   Lymphadenopathy:      Cervical: No cervical adenopathy.   Skin:     General: Skin is warm and dry.      Capillary Refill: Capillary refill takes less than 2 seconds.   Neurological:      General: No focal deficit present.      Mental Status: He is alert and oriented to person, place, and time.   Psychiatric:         Mood and Affect: Mood normal.         Behavior: Behavior normal.

## 2024-10-29 NOTE — ASSESSMENT & PLAN NOTE
Lab Results   Component Value Date    HGBA1C 9.5 (A) 10/28/2024   -uncontrolled, A1c is 9.5 today, up from 7.6 in Jan 2024. Pt admits he does not always comply with his insulin regimen and frequently skips doses when he gets busy with work  -current regimen: lispro 16u TID with meals, tresiba 36u qhs.     Plan:  -counseled extensively on long term health risks associated with uncontrolled diabetes including: CAD, MI, Stroke, PVD, kidney disease, eye disease, neuropathy, infection, limb loss   -discussed reestablishing with endocrinology, pt defers   -discussed transitioning to an oral regimen, pt defers, states taking the insulin doesn't bother him, he just doesn't do it regularly like he is supposed to  -pt is agreeable to take insulin daily as prescribed  -will check labwork today, microalb/cr ratio. Return for follow up/repeat A1c in 3 months    Orders:    POCT hemoglobin A1c    TSH, 3rd generation with Free T4 reflex; Future    Lipid panel; Future    Comprehensive metabolic panel; Future    CBC and Platelet; Future    Albumin / creatinine urine ratio; Future

## 2024-10-29 NOTE — ASSESSMENT & PLAN NOTE
-mildly elevated today, pt reports due to work stress. Declines repeat.  Orders:    TSH, 3rd generation with Free T4 reflex; Future    Lipid panel; Future    Comprehensive metabolic panel; Future    CBC and Platelet; Future    Albumin / creatinine urine ratio; Future

## 2024-11-17 DIAGNOSIS — E11.65 UNCONTROLLED TYPE 2 DIABETES MELLITUS WITH HYPERGLYCEMIA (HCC): ICD-10-CM

## 2024-11-18 RX ORDER — INSULIN DEGLUDEC 100 U/ML
36 INJECTION, SOLUTION SUBCUTANEOUS
Qty: 30 ML | Refills: 1 | Status: SHIPPED | OUTPATIENT
Start: 2024-11-18

## 2024-11-21 ENCOUNTER — CLINICAL SUPPORT (OUTPATIENT)
Dept: FAMILY MEDICINE CLINIC | Facility: CLINIC | Age: 69
End: 2024-11-21
Payer: MEDICARE

## 2024-11-21 DIAGNOSIS — R35.0 INCREASED URINARY FREQUENCY: ICD-10-CM

## 2024-11-21 DIAGNOSIS — E11.40 TYPE 2 DIABETES MELLITUS WITH DIABETIC NEUROPATHY, WITHOUT LONG-TERM CURRENT USE OF INSULIN (HCC): ICD-10-CM

## 2024-11-21 DIAGNOSIS — I10 ESSENTIAL HYPERTENSION: ICD-10-CM

## 2024-11-21 PROCEDURE — 36415 COLL VENOUS BLD VENIPUNCTURE: CPT

## 2024-11-21 NOTE — PROGRESS NOTES
Attempted to draw blood from left AC but failed. Blood drawn from right AC. Patient tolerated well. Labs labeled.

## 2024-11-22 LAB
ALBUMIN SERPL-MCNC: 4 G/DL (ref 3.6–5.1)
ALBUMIN/CREAT UR: 21 MG/G CREAT
ALBUMIN/GLOB SERPL: 1.3 (CALC) (ref 1–2.5)
ALP SERPL-CCNC: 90 U/L (ref 35–144)
ALT SERPL-CCNC: 14 U/L (ref 9–46)
AST SERPL-CCNC: 12 U/L (ref 10–35)
BILIRUB SERPL-MCNC: 0.8 MG/DL (ref 0.2–1.2)
BUN SERPL-MCNC: 13 MG/DL (ref 7–25)
BUN/CREAT SERPL: ABNORMAL (CALC) (ref 6–22)
CALCIUM SERPL-MCNC: 9.2 MG/DL (ref 8.6–10.3)
CHLORIDE SERPL-SCNC: 102 MMOL/L (ref 98–110)
CHOLEST SERPL-MCNC: 156 MG/DL
CHOLEST/HDLC SERPL: 3.5 (CALC)
CO2 SERPL-SCNC: 25 MMOL/L (ref 20–32)
CREAT SERPL-MCNC: 1.04 MG/DL (ref 0.7–1.28)
CREAT UR-MCNC: 86 MG/DL (ref 20–320)
ERYTHROCYTE [DISTWIDTH] IN BLOOD BY AUTOMATED COUNT: 12.1 % (ref 11–15)
GFR/BSA.PRED SERPLBLD CYS-BASED-ARV: 77 ML/MIN/1.73M2
GLOBULIN SER CALC-MCNC: 3 G/DL (CALC) (ref 1.9–3.7)
GLUCOSE SERPL-MCNC: 348 MG/DL (ref 65–99)
HCT VFR BLD AUTO: 46 % (ref 38.5–50)
HDLC SERPL-MCNC: 44 MG/DL
HGB BLD-MCNC: 15.7 G/DL (ref 13.2–17.1)
LDLC SERPL CALC-MCNC: 91 MG/DL (CALC)
MCH RBC QN AUTO: 31.5 PG (ref 27–33)
MCHC RBC AUTO-ENTMCNC: 34.1 G/DL (ref 32–36)
MCV RBC AUTO: 92.2 FL (ref 80–100)
MICROALBUMIN UR-MCNC: 1.8 MG/DL
NONHDLC SERPL-MCNC: 112 MG/DL (CALC)
PLATELET # BLD AUTO: 169 THOUSAND/UL (ref 140–400)
PMV BLD REES-ECKER: 10.2 FL (ref 7.5–12.5)
POTASSIUM SERPL-SCNC: 4.1 MMOL/L (ref 3.5–5.3)
PROT SERPL-MCNC: 7 G/DL (ref 6.1–8.1)
PSA FREE MFR SERPL: 29 % (CALC)
PSA FREE SERPL-MCNC: 1.2 NG/ML
PSA SERPL-MCNC: 4.1 NG/ML
RBC # BLD AUTO: 4.99 MILLION/UL (ref 4.2–5.8)
SODIUM SERPL-SCNC: 138 MMOL/L (ref 135–146)
TRIGL SERPL-MCNC: 117 MG/DL
TSH SERPL-ACNC: 1.77 MIU/L (ref 0.4–4.5)
WBC # BLD AUTO: 6.8 THOUSAND/UL (ref 3.8–10.8)

## 2024-11-26 ENCOUNTER — RESULTS FOLLOW-UP (OUTPATIENT)
Dept: FAMILY MEDICINE CLINIC | Facility: CLINIC | Age: 69
End: 2024-11-26

## 2024-12-26 ENCOUNTER — OFFICE VISIT (OUTPATIENT)
Dept: FAMILY MEDICINE CLINIC | Facility: CLINIC | Age: 69
End: 2024-12-26
Payer: MEDICARE

## 2024-12-26 VITALS
SYSTOLIC BLOOD PRESSURE: 170 MMHG | DIASTOLIC BLOOD PRESSURE: 98 MMHG | TEMPERATURE: 97.4 F | OXYGEN SATURATION: 97 % | HEART RATE: 97 BPM | WEIGHT: 239 LBS | HEIGHT: 71 IN | BODY MASS INDEX: 33.46 KG/M2

## 2024-12-26 DIAGNOSIS — I10 ESSENTIAL HYPERTENSION: Primary | ICD-10-CM

## 2024-12-26 DIAGNOSIS — R10.32 LEFT LOWER QUADRANT PAIN: ICD-10-CM

## 2024-12-26 DIAGNOSIS — N52.9 ERECTILE DYSFUNCTION, UNSPECIFIED ERECTILE DYSFUNCTION TYPE: ICD-10-CM

## 2024-12-26 PROCEDURE — 99213 OFFICE O/P EST LOW 20 MIN: CPT

## 2024-12-26 PROCEDURE — G2211 COMPLEX E/M VISIT ADD ON: HCPCS

## 2024-12-26 RX ORDER — SILDENAFIL 50 MG/1
50 TABLET, FILM COATED ORAL AS NEEDED
Qty: 10 TABLET | Refills: 1 | Status: SHIPPED | OUTPATIENT
Start: 2024-12-26

## 2024-12-26 RX ORDER — BLOOD PRESSURE TEST KIT
KIT MISCELLANEOUS DAILY
Qty: 1 KIT | Refills: 0 | Status: SHIPPED | OUTPATIENT
Start: 2024-12-26

## 2024-12-26 RX ORDER — LOSARTAN POTASSIUM 50 MG/1
50 TABLET ORAL DAILY
Qty: 30 TABLET | Refills: 1 | Status: SHIPPED | OUTPATIENT
Start: 2024-12-26

## 2024-12-26 NOTE — ASSESSMENT & PLAN NOTE
-uncontrolled BP is 170/98 today with repeat 170/110; pt is asymptomatic denies headache, chest pain, shortness of breath   -Not currently taking antihypertensives; pt has been on valsartan-HCTZ in the past, but discontinued because he reports his blood pressure was well controlled after losing weight.     Plan:  Start losartan 50mg QD  Prescription for home BP cuff given, instructed patient to check BP at home 1-2 times daily. Bring BP logs and home cuff to follow up in 2 weeks. If BP still elevated can increase losaran to 100mg QD at that time   Orders:    Blood Pressure KIT; Use in the morning    losartan (COZAAR) 50 mg tablet; Take 1 tablet (50 mg total) by mouth daily

## 2024-12-26 NOTE — PROGRESS NOTES
Name: Syed Reese      : 1955      MRN: [id number]  Encounter Provider: Heaven Ardon DO  Encounter Date: 2024   Encounter department: [facility]  :  Assessment & Plan  Essential hypertension  -uncontrolled BP is 170/98 today with repeat 170/110; pt is asymptomatic denies headache, chest pain, shortness of breath   -Not currently taking antihypertensives; pt has been on valsartan-HCTZ in the past, but discontinued because he reports his blood pressure was well controlled after losing weight.     Plan:  Start losartan 50mg QD  Prescription for home BP cuff given, instructed patient to check BP at home 1-2 times daily. Bring BP logs and home cuff to follow up in 2 weeks. If BP still elevated can increase losaran to 100mg QD at that time   Orders:    Blood Pressure KIT; Use in the morning    losartan (COZAAR) 50 mg tablet; Take 1 tablet (50 mg total) by mouth daily    Left lower quadrant pain  -acute, 1 week of dull LLQ pain.   -ddx: diverticulitis vs renal stone vs muscle strain vs constipation    Plan:  CT abdomen pelvis  ED precautions reviewed   Orders:    CT abdomen pelvis w contrast; Future    Erectile dysfunction, unspecified erectile dysfunction type    Orders:    sildenafil (VIAGRA) 50 MG tablet; Take 1 tablet (50 mg total) by mouth as needed for erectile dysfunction           History of Present Illness     Started 1 week ago with pain in left lower quadrant that radiates down his left groin. [place] home from the [facility]. Describes it as a constant dull ache. Improves with advil. Believes it started about 6 days ago and the day after it started he had a fever that resolved after a day. Was very fatigued earlier this week as well. Had 2 episodes of emesis when the pain first started, but none since. Does not feel any bulges or tearing in his abdomen or inguinal area. Has history of left perirectal abscess in 2023. Reports this  "pain/discomfort feels different.   Denies diarrhea or constipation. No blood in stool. No changes in appetite.       Review of Systems   Constitutional:  Positive for fatigue. Negative for chills and fever.   HENT:  Negative for ear pain and sore throat.    Eyes:  Negative for pain and visual disturbance.   Respiratory:  Negative for cough and shortness of breath.    Cardiovascular:  Negative for chest pain and palpitations.   Gastrointestinal:  Positive for abdominal pain. Negative for vomiting.   Genitourinary:  Negative for dysuria and hematuria.   Musculoskeletal:  Negative for arthralgias and back pain.   Skin:  Negative for color change and rash.   Neurological:  Negative for seizures and syncope.   All other systems reviewed and are negative.      Objective   /98 (BP Location: Left arm, Patient Position: Sitting, Cuff Size: Large)   Pulse 97   Temp (!) 97.4 °F (36.3 °C) (Tympanic)   Ht 5' 10.5\" (1.791 m)   Wt 108 kg (239 lb)   SpO2 97%   BMI 33.81 kg/m²      Physical Exam  Vitals and nursing note reviewed.   Constitutional:       General: He is not in acute distress.     Appearance: Normal appearance. He is well-developed and normal weight. He is not ill-appearing or toxic-appearing.   HENT:      Head: Normocephalic and atraumatic.      Right Ear: Tympanic membrane, ear canal and external ear normal.      Left Ear: Tympanic membrane, ear canal and external ear normal.      Nose: Nose normal.      Mouth/Throat:      Mouth: Mucous membranes are moist.      Pharynx: Oropharynx is clear. No oropharyngeal exudate or posterior oropharyngeal erythema.   Eyes:      Extraocular Movements: Extraocular movements intact.      Conjunctiva/sclera: Conjunctivae normal.      Pupils: Pupils are equal, round, and reactive to light.   Cardiovascular:      Rate and Rhythm: Normal rate and regular rhythm.      Heart sounds: Normal heart sounds. No murmur heard.  Pulmonary:      Effort: Pulmonary effort is normal. No " respiratory distress.      Breath sounds: Normal breath sounds. No wheezing, rhonchi or rales.   Abdominal:      General: Bowel sounds are normal. There is no distension.      Palpations: Abdomen is soft.      Tenderness: There is abdominal tenderness (LLQ) in the left lower quadrant. There is no right CVA tenderness, left CVA tenderness, guarding or rebound. Negative signs include Stout's sign and McBurney's sign.   Musculoskeletal:         General: No swelling. Normal range of motion.      Cervical back: Normal range of motion and neck supple. No rigidity or tenderness.      Right lower leg: No edema.      Left lower leg: No edema.   Lymphadenopathy:      Cervical: No cervical adenopathy.   Skin:     General: Skin is warm and dry.      Capillary Refill: Capillary refill takes less than 2 seconds.   Neurological:      General: No focal deficit present.      Mental Status: He is alert and oriented to person, place, and time.   Psychiatric:         Mood and Affect: Mood normal.         Behavior: Behavior normal.

## 2024-12-27 ENCOUNTER — RESULTS FOLLOW-UP (OUTPATIENT)
Dept: FAMILY MEDICINE CLINIC | Facility: CLINIC | Age: 69
End: 2024-12-27

## 2024-12-27 ENCOUNTER — HOSPITAL ENCOUNTER (OUTPATIENT)
Dept: CT IMAGING | Facility: HOSPITAL | Age: 69
Discharge: HOME | End: 2024-12-27
Payer: MEDICARE

## 2024-12-27 ENCOUNTER — TELEPHONE (OUTPATIENT)
Age: 69
End: 2024-12-27

## 2024-12-27 ENCOUNTER — HOSPITAL ENCOUNTER (EMERGENCY)
Facility: HOSPITAL | Age: 69
Discharge: HOME | End: 2024-12-27
Attending: EMERGENCY MEDICINE
Payer: MEDICARE

## 2024-12-27 ENCOUNTER — HOSPITAL ENCOUNTER (EMERGENCY)
Facility: HOSPITAL | Age: 69
Discharge: HOME | End: 2024-12-27
Payer: MEDICARE

## 2024-12-27 VITALS
OXYGEN SATURATION: 98 % | SYSTOLIC BLOOD PRESSURE: 193 MMHG | TEMPERATURE: 98 F | DIASTOLIC BLOOD PRESSURE: 117 MMHG | RESPIRATION RATE: 20 BRPM | HEART RATE: 86 BPM

## 2024-12-27 VITALS
OXYGEN SATURATION: 98 % | DIASTOLIC BLOOD PRESSURE: 122 MMHG | RESPIRATION RATE: 14 BRPM | TEMPERATURE: 97.9 F | HEART RATE: 83 BPM | SYSTOLIC BLOOD PRESSURE: 205 MMHG

## 2024-12-27 DIAGNOSIS — N13.30 HYDRONEPHROSIS OF LEFT KIDNEY: ICD-10-CM

## 2024-12-27 DIAGNOSIS — N20.0 KIDNEY STONE ON LEFT SIDE: Primary | ICD-10-CM

## 2024-12-27 DIAGNOSIS — R10.32 LEFT LOWER QUADRANT PAIN: ICD-10-CM

## 2024-12-27 DIAGNOSIS — R10.9 LEFT FLANK PAIN: ICD-10-CM

## 2024-12-27 LAB
ALBUMIN SERPL BCG-MCNC: 3.7 G/DL (ref 3.5–5)
ALP SERPL-CCNC: 83 U/L (ref 34–104)
ALT SERPL W P-5'-P-CCNC: 10 U/L (ref 7–52)
ANION GAP SERPL CALCULATED.3IONS-SCNC: 8 MMOL/L (ref 4–13)
AST SERPL W P-5'-P-CCNC: 11 U/L (ref 13–39)
BASOPHILS # BLD AUTO: 0.04 THOUSANDS/ÂΜL (ref 0–0.1)
BASOPHILS NFR BLD AUTO: 1 % (ref 0–1)
BILIRUB SERPL-MCNC: 0.49 MG/DL (ref 0.2–1)
BILIRUB UR QL STRIP: NEGATIVE
BUN SERPL-MCNC: 17 MG/DL (ref 5–25)
CALCIUM SERPL-MCNC: 8.6 MG/DL (ref 8.4–10.2)
CHLORIDE SERPL-SCNC: 102 MMOL/L (ref 96–108)
CLARITY UR: CLEAR
CO2 SERPL-SCNC: 26 MMOL/L (ref 21–32)
COLOR UR: YELLOW
CREAT SERPL-MCNC: 1.32 MG/DL (ref 0.6–1.3)
EOSINOPHIL # BLD AUTO: 0.17 THOUSAND/ÂΜL (ref 0–0.61)
EOSINOPHIL NFR BLD AUTO: 2 % (ref 0–6)
ERYTHROCYTE [DISTWIDTH] IN BLOOD BY AUTOMATED COUNT: 12.2 % (ref 11.6–15.1)
GFR SERPL CREATININE-BSD FRML MDRD: 54 ML/MIN/1.73SQ M
GLUCOSE SERPL-MCNC: 246 MG/DL (ref 65–140)
GLUCOSE UR STRIP-MCNC: ABNORMAL MG/DL
HCT VFR BLD AUTO: 40.9 % (ref 36.5–49.3)
HGB BLD-MCNC: 14.3 G/DL (ref 12–17)
HGB UR QL STRIP.AUTO: NEGATIVE
IMM GRANULOCYTES # BLD AUTO: 0.04 THOUSAND/UL (ref 0–0.2)
IMM GRANULOCYTES NFR BLD AUTO: 1 % (ref 0–2)
KETONES UR STRIP-MCNC: ABNORMAL MG/DL
LEUKOCYTE ESTERASE UR QL STRIP: NEGATIVE
LIPASE SERPL-CCNC: 27 U/L (ref 11–82)
LYMPHOCYTES # BLD AUTO: 1.27 THOUSANDS/ÂΜL (ref 0.6–4.47)
LYMPHOCYTES NFR BLD AUTO: 15 % (ref 14–44)
MCH RBC QN AUTO: 31.9 PG (ref 26.8–34.3)
MCHC RBC AUTO-ENTMCNC: 35 G/DL (ref 31.4–37.4)
MCV RBC AUTO: 91 FL (ref 82–98)
MONOCYTES # BLD AUTO: 0.56 THOUSAND/ÂΜL (ref 0.17–1.22)
MONOCYTES NFR BLD AUTO: 7 % (ref 4–12)
NEUTROPHILS # BLD AUTO: 6.15 THOUSANDS/ÂΜL (ref 1.85–7.62)
NEUTS SEG NFR BLD AUTO: 74 % (ref 43–75)
NITRITE UR QL STRIP: NEGATIVE
NRBC BLD AUTO-RTO: 0 /100 WBCS
PH UR STRIP.AUTO: 6 [PH]
PLATELET # BLD AUTO: 154 THOUSANDS/UL (ref 149–390)
PMV BLD AUTO: 9.4 FL (ref 8.9–12.7)
POTASSIUM SERPL-SCNC: 4.1 MMOL/L (ref 3.5–5.3)
PROT SERPL-MCNC: 6.8 G/DL (ref 6.4–8.4)
PROT UR STRIP-MCNC: NEGATIVE MG/DL
RBC # BLD AUTO: 4.48 MILLION/UL (ref 3.88–5.62)
SODIUM SERPL-SCNC: 136 MMOL/L (ref 135–147)
SP GR UR STRIP.AUTO: 1.02 (ref 1–1.03)
UROBILINOGEN UR STRIP-ACNC: <2 MG/DL
WBC # BLD AUTO: 8.23 THOUSAND/UL (ref 4.31–10.16)

## 2024-12-27 PROCEDURE — 74177 CT ABD & PELVIS W/CONTRAST: CPT

## 2024-12-27 PROCEDURE — 99284 EMERGENCY DEPT VISIT MOD MDM: CPT | Performed by: EMERGENCY MEDICINE

## 2024-12-27 PROCEDURE — 80053 COMPREHEN METABOLIC PANEL: CPT

## 2024-12-27 PROCEDURE — 85025 COMPLETE CBC W/AUTO DIFF WBC: CPT

## 2024-12-27 PROCEDURE — 83690 ASSAY OF LIPASE: CPT

## 2024-12-27 PROCEDURE — 36415 COLL VENOUS BLD VENIPUNCTURE: CPT

## 2024-12-27 PROCEDURE — 99284 EMERGENCY DEPT VISIT MOD MDM: CPT

## 2024-12-27 RX ORDER — SODIUM CHLORIDE, SODIUM GLUCONATE, SODIUM ACETATE, POTASSIUM CHLORIDE, MAGNESIUM CHLORIDE, SODIUM PHOSPHATE, DIBASIC, AND POTASSIUM PHOSPHATE .53; .5; .37; .037; .03; .012; .00082 G/100ML; G/100ML; G/100ML; G/100ML; G/100ML; G/100ML; G/100ML
1000 INJECTION, SOLUTION INTRAVENOUS ONCE
Status: DISCONTINUED | OUTPATIENT
Start: 2024-12-27 | End: 2024-12-27 | Stop reason: HOSPADM

## 2024-12-27 RX ORDER — ONDANSETRON 4 MG/1
4 TABLET, FILM COATED ORAL EVERY 6 HOURS
Qty: 15 TABLET | Refills: 0 | Status: SHIPPED | OUTPATIENT
Start: 2024-12-27

## 2024-12-27 RX ORDER — TAMSULOSIN HYDROCHLORIDE 0.4 MG/1
0.4 CAPSULE ORAL
Qty: 15 CAPSULE | Refills: 0 | Status: SHIPPED | OUTPATIENT
Start: 2024-12-27 | End: 2025-01-11

## 2024-12-27 RX ORDER — OXYCODONE HYDROCHLORIDE 5 MG/1
5 TABLET ORAL EVERY 4 HOURS PRN
Qty: 14 TABLET | Refills: 0 | Status: SHIPPED | OUTPATIENT
Start: 2024-12-27 | End: 2024-12-27

## 2024-12-27 RX ORDER — OXYCODONE HYDROCHLORIDE 5 MG/1
5 TABLET ORAL EVERY 4 HOURS PRN
Qty: 20 TABLET | Refills: 0 | Status: SHIPPED | OUTPATIENT
Start: 2024-12-27

## 2024-12-27 RX ADMIN — IOHEXOL 50 ML: 240 INJECTION, SOLUTION INTRATHECAL; INTRAVASCULAR; INTRAVENOUS; ORAL at 10:06

## 2024-12-27 RX ADMIN — IOHEXOL 100 ML: 350 INJECTION, SOLUTION INTRAVENOUS at 10:06

## 2024-12-27 NOTE — TELEPHONE ENCOUNTER
CT ABDOMEN PELVIS W CONTRAST  12/27/2024  [facility] CAT Scan     Left lower quadrant pain  Dx Referred by Heaven Ardon DO     Reporting Immediate findings on this CT Scan.

## 2024-12-28 NOTE — DISCHARGE INSTRUCTIONS
You were seen in the Emergency Department today for kidney stone.  A prescription for opioid pain medication was sent to your pharmacy, as well a flomax please take as prescribed.   Referral to urology was provided.  Please follow up with urology as soon as possible for re-evaluation.  Please return to the Emergency Department if you experience worsening of your current symptoms or any other concerning symptoms including fever, chills, uncontrolled vomiting or uncontrolled pain.

## 2024-12-28 NOTE — ED PROVIDER NOTES
"Time reflects when diagnosis was documented in both MDM as applicable and the Disposition within this note       Time User Action Codes Description Comment    12/27/2024  7:38 PM KeysDunia cramer FIGUEROA Add [N20.0] Kidney stone on left side     12/27/2024  7:39 PM Keys, Thu T Add [N13.30] Hydronephrosis of left kidney     12/27/2024  7:39 PM Keys, Thu T Add [R10.9] Left flank pain           ED Disposition       ED Disposition   Discharge    Condition   Stable    Date/Time   Fri Dec 27, 2024  7:38 PM    Comment   Syed Reese discharge to home/self care.                   Assessment & Plan       Medical Decision Making  Risk  Prescription drug management.      ASSESSMENT: Patient is a 69 y.o. male who presents with left flank pain in setting of known kidney stone.   DDX includes but not limited to: renal colic vs kidney stone vs ureteral spasm.   PLAN: review labs and CT from earlier today. Pain well controlled in the ED without intervention. No fever, chills, or new pain. Will defer repeat lab work.     ED Course as of 12/29/24 2000   Fri Dec 27, 2024   1914 Review CBC, CMP, lipase, UA from earlier today. No leukocytosis. Cr 1.32 which is slightly up from 1.04 from labs a month ago. UA without evidence of infection or blood. CT AP from today showed \"7 mm obstructing calculus left UP junction with moderate left hydronephrosis and perinephric stranding.\"     Stable for discharge. Strict return to ED precautions provided.  Rx for Zofran, oxycodone, Flomax sent to pharmacy.  Referral to urology provided.  Patient also provided with urine strainers. Advised to follow up with urology as soon as able for re-evaluation and further management. Patient verbalized understanding and agrees with the plan of care.       Medications - No data to display    ED Risk Strat Scores                          SBIRT 20yo+      Flowsheet Row Most Recent Value   Initial Alcohol Screen: US AUDIT-C     1. How often do you have a drink containing " alcohol? 0 Filed at: 12/27/2024 1642   2. How many drinks containing alcohol do you have on a typical day you are drinking?  0 Filed at: 12/27/2024 1642   3a. Male UNDER 65: How often do you have five or more drinks on one occasion? 0 Filed at: 12/27/2024 1642   3b. FEMALE Any Age, or MALE 65+: How often do you have 4 or more drinks on one occassion? 0 Filed at: 12/27/2024 1642   Audit-C Score 0 Filed at: 12/27/2024 1642   CARMELLA: How many times in the past year have you...    Used an illegal drug or used a prescription medication for non-medical reasons? Never Filed at: 12/27/2024 1642                            History of Present Illness       Chief Complaint   Patient presents with    Flank Pain     Patient coming in for left flank pain, physician sent for large stone seen on CT and referred to come to us for management.        Past Medical History:   Diagnosis Date    Cyst of skin 04/29/2021    Behind right ear    Diabetes mellitus (HCC)     Epithelial inclusion cyst 7/7/2021      Past Surgical History:   Procedure Laterality Date    CYST REMOVAL Right 04/29/2021    Excision of skin cyst from behind right ear.  Office procedure      Family History   Problem Relation Age of Onset    Hypertension Father     Colon cancer Neg Hx     Colon polyps Neg Hx       Social History     Tobacco Use    Smoking status: Never    Smokeless tobacco: Never   Vaping Use    Vaping status: Never Used   Substance Use Topics    Alcohol use: Yes     Alcohol/week: 1.0 standard drink of alcohol     Types: 1 Standard drinks or equivalent per week     Comment: not much    Drug use: No      E-Cigarette/Vaping    E-Cigarette Use Never User       E-Cigarette/Vaping Substances    Nicotine No     THC No     CBD No     Flavoring No     Other No     Unknown No       I have reviewed and agree with the history as documented.     HPI  Patient is a 69 y.o. male with PMHx hypertension, diabetes mellitus who presents to the ED via private vehicle for  evaluation of left flank pain x 1 week.  Patient endorses pain radiating to the left groin, dull in nature, improves with Advil.  Patient was evaluated by his PCP yesterday, got labs and a CT scan today and found to have a  7 mm obstructing stone on the left.  He denies any fever, chills, chest pain, shortness of breath, nausea, vomiting, dysuria, hematuria, urinary frequency/urgency, difficulty with urination.  Denies history of kidney stones.    Review of Systems   Genitourinary:  Positive for flank pain.           Objective       ED Triage Vitals [12/27/24 1638]   Temperature Pulse Blood Pressure Respirations SpO2 Patient Position - Orthostatic VS   97.9 °F (36.6 °C) 83 (!) 205/122 14 98 % Sitting      Temp Source Heart Rate Source BP Location FiO2 (%) Pain Score    Oral Monitor Left arm -- 4      Vitals      Date and Time Temp Pulse SpO2 Resp BP Pain Score FACES Pain Rating User   12/27/24 1907 -- -- -- -- -- 7 -- RJ   12/27/24 1641 -- -- 98 % -- -- -- -- DJS   12/27/24 1638 97.9 °F (36.6 °C) 83 98 % 14 205/122 4 -- DJS            Physical Exam  Vitals and nursing note reviewed.   Constitutional:       General: He is not in acute distress.     Appearance: Normal appearance. He is well-developed. He is not ill-appearing, toxic-appearing or diaphoretic.   HENT:      Head: Normocephalic and atraumatic.      Mouth/Throat:      Mouth: Mucous membranes are moist.      Pharynx: Oropharynx is clear.   Eyes:      Conjunctiva/sclera: Conjunctivae normal.   Cardiovascular:      Rate and Rhythm: Normal rate and regular rhythm.      Heart sounds: No murmur heard.  Pulmonary:      Effort: Pulmonary effort is normal. No respiratory distress.      Breath sounds: Normal breath sounds.   Abdominal:      Palpations: Abdomen is soft.      Tenderness: There is no abdominal tenderness. There is no right CVA tenderness or left CVA tenderness.   Musculoskeletal:         General: No swelling.      Cervical back: Neck supple.   Skin:      "General: Skin is warm and dry.      Capillary Refill: Capillary refill takes less than 2 seconds.   Neurological:      General: No focal deficit present.      Mental Status: He is alert and oriented to person, place, and time.   Psychiatric:         Mood and Affect: Mood normal.         Results Reviewed       None            No orders to display       Procedures    ED Medication and Procedure Management   Prior to Admission Medications   Prescriptions Last Dose Informant Patient Reported? Taking?   Alcohol Swabs PADS  Self No No   Sig: Use 4 (four) times a day   Blood Glucose Monitoring Suppl (FreeStyle Lite) TAMMY  Self No No   Sig: by Percutaneous route 2 (two) times a day   Blood Pressure KIT   No No   Sig: Use in the morning   Insulin Pen Needle (PEN NEEDLES 29GX1/2\") 29G X 12MM MISC  Self No No   Sig: Use 4 (four) times a day   Lancets (freestyle) lancets  Self No No   Sig: Use as instructed E11.65 testing twice a day   glucose monitoring kit (FREESTYLE) monitoring kit  Self No No   Si each by Does not apply route 2 (two) times a day before lunch and dinner E11.65   insulin degludec (Tresiba FlexTouch) 100 units/mL injection pen   No No   Sig: INJECT 36 UNITS UNDER THE SKIN DAILY AT BEDTIME   insulin lispro (HumaLOG KwikPen) 100 units/mL injection pen   No No   Sig: INJECT 16 UNITS UNDER THE SKIN 3 (THREE) TIMES A DAY WITH MEALS   losartan (COZAAR) 50 mg tablet   No No   Sig: Take 1 tablet (50 mg total) by mouth daily   sildenafil (VIAGRA) 50 MG tablet   No No   Sig: Take 1 tablet (50 mg total) by mouth as needed for erectile dysfunction      Facility-Administered Medications: None     Discharge Medication List as of 2024  7:55 PM        START taking these medications    Details   ondansetron (ZOFRAN) 4 mg tablet Take 1 tablet (4 mg total) by mouth every 6 (six) hours, Starting Fri 2024, Normal      tamsulosin (FLOMAX) 0.4 mg Take 1 capsule (0.4 mg total) by mouth daily with dinner for 15 days, " "Starting Fri 12/27/2024, Until Sat 1/11/2025, Normal           CONTINUE these medications which have CHANGED    Details   oxyCODONE (Roxicodone) 5 immediate release tablet Take 1 tablet (5 mg total) by mouth every 4 (four) hours as needed for moderate pain or severe pain for up to 20 doses Max Daily Amount: 30 mg, Starting Fri 12/27/2024, Normal           CONTINUE these medications which have NOT CHANGED    Details   Alcohol Swabs PADS Use 4 (four) times a day, Starting Thu 9/28/2023, Normal      Blood Glucose Monitoring Suppl (FreeStyle Lite) TAMMY by Percutaneous route 2 (two) times a day, Starting Wed 10/18/2023, Normal      Blood Pressure KIT Use in the morning, Starting Thu 12/26/2024, Print      glucose monitoring kit (FREESTYLE) monitoring kit 1 each by Does not apply route 2 (two) times a day before lunch and dinner E11.65, Starting Mon 10/22/2018, Normal      insulin degludec (Tresiba FlexTouch) 100 units/mL injection pen INJECT 36 UNITS UNDER THE SKIN DAILY AT BEDTIME, Starting Mon 11/18/2024, Normal      insulin lispro (HumaLOG KwikPen) 100 units/mL injection pen INJECT 16 UNITS UNDER THE SKIN 3 (THREE) TIMES A DAY WITH MEALS, Normal      Insulin Pen Needle (PEN NEEDLES 29GX1/2\") 29G X 12MM MISC Use 4 (four) times a day, Starting Thu 11/2/2023, Normal      Lancets (freestyle) lancets Use as instructed E11.65 testing twice a day, Normal      losartan (COZAAR) 50 mg tablet Take 1 tablet (50 mg total) by mouth daily, Starting Thu 12/26/2024, Normal      sildenafil (VIAGRA) 50 MG tablet Take 1 tablet (50 mg total) by mouth as needed for erectile dysfunction, Starting Thu 12/26/2024, Normal             ED SEPSIS DOCUMENTATION   Time reflects when diagnosis was documented in both MDM as applicable and the Disposition within this note       Time User Action Codes Description Comment    12/27/2024  7:38 PM Dunia Keys Add [N20.0] Kidney stone on left side     12/27/2024  7:39 PM Dunia Keys Add [N13.30] " Hydronephrosis of left kidney     12/27/2024  7:39 PM Dunia Keys T Add [R10.9] Left flank pain                  Dunia Keys MD  12/29/24 2000

## 2024-12-30 ENCOUNTER — TELEPHONE (OUTPATIENT)
Age: 69
End: 2024-12-30

## 2024-12-30 NOTE — TELEPHONE ENCOUNTER
"Nato, S/O of the patient, was calling to schedule a new patient appt for the patient. He was seen in the ED on 12/27/24 and referred to Urology. A CT was completed on 12/27/2024 as well.     Per the CT results \"Moderate left hydronephrosis and hydroureter seen there is an obstructing calculus left UV junction measuring about 7 mm there is perinephric stranding\"    Per the decision tree, patient should be scheduled within 1 week. Next available that worked for the patient's schedule was 1/15/2025     Can this please be reviewed if the patient needs to be seen sooner?    Office of preference is [facility]. Insurance confirmed.   "

## 2024-12-30 NOTE — ED ATTENDING ATTESTATION
12/27/2024  I, Thiago Cornelius DO, saw and evaluated the patient. I have discussed the patient with the resident/non-physician practitioner and agree with the resident's/non-physician practitioner's findings, Plan of Care, and MDM as documented in the resident's/non-physician practitioner's note, except where noted. All available labs and Radiology studies were reviewed.  I was present for key portions of any procedure(s) performed by the resident/non-physician practitioner and I was immediately available to provide assistance.       At this point I agree with the current assessment done in the Emergency Department.  I have conducted an independent evaluation of this patient a history and physical is as follows:    69-year-old male, kidney stone, proximal 7 mm found on outpatient scan.  No systemic symptoms no sepsis pain controlled.  Will check urinalysis to rule out infection acute kidney injury, likely referral to outpatient urology    ED Course         Critical Care Time  Procedures

## 2024-12-30 NOTE — TELEPHONE ENCOUNTER
Called Syed back to schedule him in the [place] office next week - he states he doesn't want an AP only a doctor and said the appointment with Dr Valentin is the first appointment with a doctor  He said he will keep appointment for now and may see if he can get into [facility] sooner

## 2025-01-19 DIAGNOSIS — I10 ESSENTIAL HYPERTENSION: ICD-10-CM

## 2025-01-20 RX ORDER — LOSARTAN POTASSIUM 50 MG/1
50 TABLET ORAL DAILY
Qty: 90 TABLET | Refills: 1 | Status: SHIPPED | OUTPATIENT
Start: 2025-01-20

## 2025-01-21 ENCOUNTER — PREP FOR PROCEDURE (OUTPATIENT)
Dept: UROLOGY | Facility: AMBULATORY SURGERY CENTER | Age: 70
End: 2025-01-21

## 2025-01-21 ENCOUNTER — CONSULT (OUTPATIENT)
Dept: UROLOGY | Facility: AMBULATORY SURGERY CENTER | Age: 70
End: 2025-01-21
Payer: MEDICARE

## 2025-01-21 VITALS
DIASTOLIC BLOOD PRESSURE: 92 MMHG | BODY MASS INDEX: 33.04 KG/M2 | SYSTOLIC BLOOD PRESSURE: 138 MMHG | RESPIRATION RATE: 27 BRPM | HEART RATE: 95 BPM | HEIGHT: 71 IN | WEIGHT: 236 LBS | OXYGEN SATURATION: 98 %

## 2025-01-21 DIAGNOSIS — N20.1 LEFT URETERAL STONE: ICD-10-CM

## 2025-01-21 DIAGNOSIS — R10.9 LEFT FLANK PAIN: ICD-10-CM

## 2025-01-21 DIAGNOSIS — N20.1 LEFT URETERAL STONE: Primary | ICD-10-CM

## 2025-01-21 DIAGNOSIS — N20.0 KIDNEY STONE ON LEFT SIDE: Primary | ICD-10-CM

## 2025-01-21 LAB
BACTERIA UR QL AUTO: ABNORMAL /HPF
BILIRUB UR QL STRIP: NEGATIVE
CLARITY UR: CLEAR
COLOR UR: ABNORMAL
GLUCOSE UR STRIP-MCNC: ABNORMAL MG/DL
HGB UR QL STRIP.AUTO: NEGATIVE
KETONES UR STRIP-MCNC: NEGATIVE MG/DL
LEUKOCYTE ESTERASE UR QL STRIP: ABNORMAL
NITRITE UR QL STRIP: NEGATIVE
NON-SQ EPI CELLS URNS QL MICRO: ABNORMAL /HPF
PH UR STRIP.AUTO: 6 [PH]
PROT UR STRIP-MCNC: NEGATIVE MG/DL
RBC #/AREA URNS AUTO: ABNORMAL /HPF
SL AMB  POCT GLUCOSE, UA: ABNORMAL
SL AMB LEUKOCYTE ESTERASE,UA: ABNORMAL
SL AMB POCT BILIRUBIN,UA: ABNORMAL
SL AMB POCT BLOOD,UA: ABNORMAL
SL AMB POCT CLARITY,UA: CLEAR
SL AMB POCT COLOR,UA: YELLOW
SL AMB POCT KETONES,UA: ABNORMAL
SL AMB POCT NITRITE,UA: ABNORMAL
SL AMB POCT PH,UA: 5
SL AMB POCT SPECIFIC GRAVITY,UA: 1.01
SL AMB POCT URINE PROTEIN: ABNORMAL
SL AMB POCT UROBILINOGEN: 0.2
SP GR UR STRIP.AUTO: 1.02 (ref 1–1.03)
UROBILINOGEN UR STRIP-ACNC: <2 MG/DL
WBC #/AREA URNS AUTO: ABNORMAL /HPF

## 2025-01-21 PROCEDURE — 81002 URINALYSIS NONAUTO W/O SCOPE: CPT

## 2025-01-21 PROCEDURE — 99203 OFFICE O/P NEW LOW 30 MIN: CPT

## 2025-01-21 PROCEDURE — 81001 URINALYSIS AUTO W/SCOPE: CPT

## 2025-01-21 PROCEDURE — 87086 URINE CULTURE/COLONY COUNT: CPT

## 2025-01-21 PROCEDURE — 88112 CYTOPATH CELL ENHANCE TECH: CPT | Performed by: PATHOLOGY

## 2025-01-21 RX ORDER — INSULIN ASPART INJECTION 100 [IU]/ML
INJECTION, SOLUTION SUBCUTANEOUS
COMMUNITY

## 2025-01-21 NOTE — PROGRESS NOTES
Office Visit- Urology  Syed Reese 1955 MRN: [id number]      Assessment/Discussion/Plan    69 y.o. male managed by     Obstructing 7 mm ureteral stone   -Left UPJ stone with moderate left hydronephrosis last imaged with a CT scan on 12/27/24  -Patient states that he did pass some gravel like material and has been asymptomatic   -we will plan to obtain a updated CT stone study to evaluate for the persistence of obstructing stone or hydronephrosis.  If stone is still present we will then consider proceeding with ureteroscopy for surgical intervention    2.  Asymmetric bladder wall thickening   CT scan demonstrated Asymmetric bladder wall thickening more pronounced along the right side than left as well as nodularity on the anterior lateral aspect of the urinary bladder wall  -Patient denies gross hematuria.  UA in December 2024 did not demonstrate blood  -send out urine for micro, culture, cytology  -Discussed with patient that we should proceed with cystoscopy either in the context of part of his surgical intervention for stone or if patient is confirmed to have passed stone with a cystoscopy in the office.      3.  Elevated PSA  -PSA was noted to be at 4.1 in November 2024 with a free PSA percentage is 29 this is elevated from his baseline 2.8.  -Offered prostate exam but pt defers  -Once stone is contracted he passed obtain updated PSA      Chief Complaint:   Syed is a 69 y.o. male presenting to the office for an initial evaluation regarding obstructing 7 mm ureteral stone        Subjective    Patient is a 69-year-old male with no significant past urologic history who presents to the office today for follow-up in regards to obstructing 7 mm left ureteral stone that was imaged on CT scan on 12/27/2024.  Patient was having left lower quadrant abdominal pain on 12/26 and was seen by his PCP CT ordered scan which demonstrated the stone there is also incidental finding of asymmetric bladder wall thickening.   Presents to the office today for discussion about the stone.  He states that he did pass gravel like substance previously he has been asymptomatic since being in the ER.  This is his first known stone episode.  He denies any smoking history, known occupational exposure to chemicals or family history of  malignancy.  He did have a PSA of 4.1 in November 2024 with a normal baseline within the twos.        ROS:   Review of Systems   Constitutional: Negative.  Negative for chills, fatigue and fever.   HENT: Negative.     Respiratory:  Negative for shortness of breath.    Cardiovascular:  Negative for chest pain.   Gastrointestinal: Negative.  Negative for abdominal pain.   Endocrine: Negative.    Musculoskeletal: Negative.    Skin: Negative.    Neurological: Negative.  Negative for dizziness and light-headedness.   Hematological: Negative.    Psychiatric/Behavioral: Negative.           Past Medical History  Past Medical History:   Diagnosis Date    Chronic kidney disease     Cyst of skin 04/29/2021    Behind right ear    Diabetes mellitus (HCC)     Epithelial inclusion cyst 07/07/2021       Past Surgical History  Past Surgical History:   Procedure Laterality Date    CYST REMOVAL Right 04/29/2021    Excision of skin cyst from behind right ear.  Office procedure       Past Family History  Family History   Problem Relation Age of Onset    Hypertension Father     Colon polyps Paternal Grandmother     Colon cancer Neg Hx        Past Social history  Social History     Socioeconomic History    Marital status: Single     Spouse name: Not on file    Number of children: Not on file    Years of education: Not on file    Highest education level: Not on file   Occupational History    Not on file   Tobacco Use    Smoking status: Never    Smokeless tobacco: Never   Vaping Use    Vaping status: Never Used   Substance and Sexual Activity    Alcohol use: Yes     Alcohol/week: 1.0 standard drink of alcohol     Types: 1 Standard drinks  "or equivalent per week     Comment: not much    Drug use: No    Sexual activity: Not on file   Other Topics Concern    Not on file   Social History Narrative    Not on file     Social Drivers of Health     Financial Resource Strain: Low Risk  (10/18/2023)    Overall Financial Resource Strain (CARDIA)     Difficulty of Paying Living Expenses: Not hard at all   Food Insecurity: No Food Insecurity (10/28/2024)    Hunger Vital Sign     Worried About Running Out of Food in the Last Year: Never true     Ran Out of Food in the Last Year: Never true   Transportation Needs: No Transportation Needs (10/28/2024)    PRAPARE - Transportation     Lack of Transportation (Medical): No     Lack of Transportation (Non-Medical): No   Physical Activity: Not on file   Stress: Not on file   Social Connections: Not on file   Intimate Partner Violence: Not on file   Housing Stability: Unknown (10/28/2024)    Housing Stability Vital Sign     Unable to Pay for Housing in the Last Year: Not on file     Number of Times Moved in the Last Year: 0     Homeless in the Last Year: No       Current Medications  Current Outpatient Medications   Medication Sig Dispense Refill    Alcohol Swabs PADS Use 4 (four) times a day 100 each 99    Blood Glucose Monitoring Suppl (FreeStyle Lite) TAMMY by Percutaneous route 2 (two) times a day 1 each 0    Blood Pressure KIT Use in the morning 1 kit 0    glucose monitoring kit (FREESTYLE) monitoring kit 1 each by Does not apply route 2 (two) times a day before lunch and dinner E11.65 1 each 0    insulin degludec (Tresiba FlexTouch) 100 units/mL injection pen INJECT 36 UNITS UNDER THE SKIN DAILY AT BEDTIME 30 mL 1    insulin lispro (HumaLOG KwikPen) 100 units/mL injection pen INJECT 16 UNITS UNDER THE SKIN 3 (THREE) TIMES A DAY WITH MEALS 15 mL 5    Insulin Pen Needle (PEN NEEDLES 29GX1/2\") 29G X 12MM MISC Use 4 (four) times a day 400 each 99    Lancets (freestyle) lancets Use as instructed E11.65 testing twice a day " "100 each 0    insulin aspart, w/niacinamide, (Fiasp FlexTouch) 100 Units/mL injection pen  (Patient not taking: Reported on 1/21/2025)      losartan (COZAAR) 50 mg tablet TAKE 1 TABLET BY MOUTH EVERY DAY (Patient not taking: Reported on 1/21/2025) 90 tablet 1    ondansetron (ZOFRAN) 4 mg tablet Take 1 tablet (4 mg total) by mouth every 6 (six) hours (Patient not taking: Reported on 1/21/2025) 15 tablet 0    oxyCODONE (Roxicodone) 5 immediate release tablet Take 1 tablet (5 mg total) by mouth every 4 (four) hours as needed for moderate pain or severe pain for up to 20 doses Max Daily Amount: 30 mg (Patient not taking: Reported on 1/21/2025) 20 tablet 0    sildenafil (VIAGRA) 50 MG tablet Take 1 tablet (50 mg total) by mouth as needed for erectile dysfunction (Patient not taking: Reported on 1/21/2025) 10 tablet 1    tamsulosin (FLOMAX) 0.4 mg Take 1 capsule (0.4 mg total) by mouth daily with dinner for 15 days 15 capsule 0     No current facility-administered medications for this visit.       Allergies  No Known Allergies    OBJECTIVE    Vitals   Vitals:    01/21/25 0930   BP: 138/92   BP Location: Left arm   Patient Position: Sitting   Cuff Size: Extra-Large   Pulse: 95   Resp: (!) 27   SpO2: 98%   Weight: 107 kg (236 lb)   Height: 5' 10.5\" (1.791 m)       PVR:    Physical Exam  Constitutional:       General: He is not in acute distress.     Appearance: Normal appearance. He is normal weight. He is not ill-appearing or toxic-appearing.   HENT:      Head: Normocephalic and atraumatic.   Eyes:      Conjunctiva/sclera: Conjunctivae normal.   Cardiovascular:      Rate and Rhythm: Normal rate.   Pulmonary:      Effort: Pulmonary effort is normal. No respiratory distress.   Skin:     General: Skin is warm and dry.   Neurological:      General: No focal deficit present.      Mental Status: He is alert and oriented to person, place, and time.      Cranial Nerves: No cranial nerve deficit.   Psychiatric:         Mood and " Affect: Mood normal.         Behavior: Behavior normal.         Thought Content: Thought content normal.          Labs:     Lab Results   Component Value Date    PSA 4.1 (H) 11/21/2024    PSA 2.8 05/20/2020    PSA 2.8 10/22/2018     Lab Results   Component Value Date    CREATININE 1.32 (H) 12/27/2024      Lab Results   Component Value Date    HGBA1C 9.5 (A) 10/28/2024     Lab Results   Component Value Date    CALCIUM 8.6 12/27/2024    K 4.1 12/27/2024    CO2 26 12/27/2024     12/27/2024    BUN 17 12/27/2024    CREATININE 1.32 (H) 12/27/2024       I have personally reviewed all pertinent lab results and reviewed with patient    Imaging   Narrative & Impression   CT ABDOMEN AND PELVIS WITH IV CONTRAST     INDICATION: R10.32: Left lower quadrant pain. .     COMPARISON: None.     TECHNIQUE: CT examination of the abdomen and pelvis was performed. Multiplanar 2D reformatted images were created from the source data.     This examination, like all CT scans performed in the [facility], was performed utilizing techniques to minimize radiation dose exposure, including the use of iterative reconstruction and automated exposure control. Radiation dose length   product (DLP) for this visit: 796.83 mGy-cm     IV Contrast: 50 mL of iohexol (OMNIPAQUE) 100 mL of iohexol (OMNIPAQUE)  Enteric Contrast: Not administered.     FINDINGS:     ABDOMEN     LOWER CHEST: No clinically significant abnormality in the visualized lower chest. Ascending aorta measures 4.5 cm  Mild thickening of the lower thoracic esophagus  LIVER/BILIARY TREE: Unremarkable.     GALLBLADDER: No calcified gallstones. No pericholecystic inflammatory change.     SPLEEN: Unremarkable.     PANCREAS: Unremarkable.     ADRENAL GLANDS: Unremarkable.     KIDNEYS/URETERS: Moderate left hydronephrosis and hydroureter seen there is an obstructing calculus left UV junction measuring about 7 mm there is perinephric stranding     STOMACH AND BOWEL:  Unremarkable.     APPENDIX: No findings to suggest appendicitis.     ABDOMINOPELVIC CAVITY: No ascites. No pneumoperitoneum. No lymphadenopathy.     VESSELS: Unremarkable for patient's age.     PELVIS     REPRODUCTIVE ORGANS: Enlarged prostate seen     URINARY BLADDER: There is mild thickening of the urinary bladder wall, more pronounced along its right side. There is nodularity at the anterolateral aspect of the urinary bladder wall     ABDOMINAL WALL/INGUINAL REGIONS: Umbilical hernia containing fat seen no bowel obstruction     BONES: No acute compression collapse of the vertebra degenerative disc disease at L5-S1, L4-5 and L3-4     IMPRESSION:     7 mm obstructing calculus left UP junction with moderate left hydronephrosis and perinephric stranding.     Asymmetric thickening of the urinary bladder along its right lateral and anterolateral aspect: Correlate with urine evaluation and nonemergent urology evaluation for further characterization     Enlarged prostate  Dilated ascending aorta measuring 4.5 cm, annual surveillance suggested     The study was marked in EPIC for immediate notification.     Workstation performed: [id number]          Ky Adair PA-C  Date: 1/21/2025 Time: 12:30 PM  [facility]    This note was written using fluency dictation software. Please excuse any resulting minor grammatical errors.

## 2025-01-22 LAB — BACTERIA UR CULT: NORMAL

## 2025-01-23 ENCOUNTER — RESULTS FOLLOW-UP (OUTPATIENT)
Dept: OTHER | Facility: HOSPITAL | Age: 70
End: 2025-01-23

## 2025-01-23 PROCEDURE — 88112 CYTOPATH CELL ENHANCE TECH: CPT | Performed by: PATHOLOGY

## 2025-01-24 NOTE — TELEPHONE ENCOUNTER
Called/spoke to patient to let him know that JESSI Mcpherson reviewed the urine test results and they show abnormal cells but they are not abnormal enough to be cancerous. As discussed in the office, it is recommended that he proceed with the CT scan, which is scheduled and we will call with results. He should also proceed with the cystoscopy. Also, already scheduled.       ----- Message from Ky Adair PA-C sent at 1/23/2025  5:31 PM EST -----  Urine testing does reveal atypical urothelial cells but no overt evidence of cancer cells.  I think that as discussed in office we will need to proceed with cystoscopy either in the context of stone surgery or in the office.  we will reach out to patient once results from CT scan return

## 2025-01-28 ENCOUNTER — TELEPHONE (OUTPATIENT)
Dept: UROLOGY | Facility: AMBULATORY SURGERY CENTER | Age: 70
End: 2025-01-28

## 2025-01-28 ENCOUNTER — HOSPITAL ENCOUNTER (OUTPATIENT)
Dept: CT IMAGING | Facility: HOSPITAL | Age: 70
Discharge: HOME | End: 2025-01-28
Payer: MEDICARE

## 2025-01-28 DIAGNOSIS — N20.1 LEFT URETERAL STONE: ICD-10-CM

## 2025-01-28 PROCEDURE — 74176 CT ABD & PELVIS W/O CONTRAST: CPT

## 2025-01-28 RX ORDER — CEFAZOLIN SODIUM 2 G/50ML
2000 SOLUTION INTRAVENOUS ONCE
OUTPATIENT
Start: 2025-01-28 | End: 2025-01-28

## 2025-01-28 NOTE — TELEPHONE ENCOUNTER
Reviewed patient's updated imaging.  There is a nonobstructing 9 mm stone in the UPJ without hydronephrosis.  There is also a 3 mm stone in the distal ureter.  On the left side.  Discussed with patient over the phone that I think that we should proceed with ureteroscopy given the size of the stone and likelihood that will not pass by itself.  He is in agreement.  Gay please schedule for next available ureteroscopy.  Reviewed ER parameters.  Discussed with patient that his asymmetric bladder wall thickening will be evaluated by the surgeon at the time of his ureteroscopy as well to complete workup at that.  Cystoscopy in the office can be canceled as he will have cystoscopy in the OR

## 2025-01-30 ENCOUNTER — PREP FOR PROCEDURE (OUTPATIENT)
Dept: UROLOGY | Facility: CLINIC | Age: 70
End: 2025-01-30

## 2025-01-30 NOTE — TELEPHONE ENCOUNTER
Called patient l/m to call back to confirm one of two surgical dates with   2/3/25 at [facility]  2/6/25 at [facility]

## 2025-02-04 NOTE — TELEPHONE ENCOUNTER
2nd Called patient l/m to call back to confirm one of two surgical dates with     2/6/25 at [facility]

## 2025-02-12 ENCOUNTER — PREP FOR PROCEDURE (OUTPATIENT)
Dept: UROLOGY | Facility: CLINIC | Age: 70
End: 2025-02-12

## 2025-02-12 DIAGNOSIS — N20.0 KIDNEY STONE ON LEFT SIDE: ICD-10-CM

## 2025-02-12 DIAGNOSIS — R39.89 SUSPECTED UTI: Primary | ICD-10-CM

## 2025-02-27 ENCOUNTER — HOSPITAL ENCOUNTER (OUTPATIENT)
Facility: HOSPITAL | Age: 70
Setting detail: OUTPATIENT SURGERY
Discharge: HOME | End: 2025-02-27
Attending: UROLOGY | Admitting: UROLOGY
Payer: MEDICARE

## 2025-02-27 ENCOUNTER — TELEPHONE (OUTPATIENT)
Dept: UROLOGY | Facility: CLINIC | Age: 70
End: 2025-02-27

## 2025-02-27 ENCOUNTER — ANESTHESIA EVENT (OUTPATIENT)
Dept: PERIOP | Facility: HOSPITAL | Age: 70
End: 2025-02-27
Payer: MEDICARE

## 2025-02-27 ENCOUNTER — APPOINTMENT (OUTPATIENT)
Dept: RADIOLOGY | Facility: HOSPITAL | Age: 70
End: 2025-02-27
Payer: MEDICARE

## 2025-02-27 ENCOUNTER — ANESTHESIA (OUTPATIENT)
Dept: PERIOP | Facility: HOSPITAL | Age: 70
End: 2025-02-27
Payer: MEDICARE

## 2025-02-27 ENCOUNTER — HOSPITAL ENCOUNTER (OUTPATIENT)
Dept: RADIOLOGY | Facility: HOSPITAL | Age: 70
Discharge: HOME | End: 2025-02-27
Payer: MEDICARE

## 2025-02-27 VITALS
OXYGEN SATURATION: 94 % | SYSTOLIC BLOOD PRESSURE: 153 MMHG | TEMPERATURE: 97.2 F | DIASTOLIC BLOOD PRESSURE: 94 MMHG | WEIGHT: 236 LBS | HEART RATE: 95 BPM | RESPIRATION RATE: 16 BRPM | BODY MASS INDEX: 33.04 KG/M2 | HEIGHT: 71 IN

## 2025-02-27 DIAGNOSIS — N20.1 LEFT URETERAL STONE: ICD-10-CM

## 2025-02-27 LAB
GLUCOSE SERPL-MCNC: 198 MG/DL (ref 65–140)
GLUCOSE SERPL-MCNC: 221 MG/DL (ref 65–140)

## 2025-02-27 PROCEDURE — 82948 REAGENT STRIP/BLOOD GLUCOSE: CPT

## 2025-02-27 PROCEDURE — NC001 PR NO CHARGE: Performed by: UROLOGY

## 2025-02-27 PROCEDURE — C2625 STENT, NON-COR, TEM W/DEL SY: HCPCS | Performed by: UROLOGY

## 2025-02-27 PROCEDURE — 82360 CALCULUS ASSAY QUANT: CPT | Performed by: UROLOGY

## 2025-02-27 PROCEDURE — C1758 CATHETER, URETERAL: HCPCS | Performed by: UROLOGY

## 2025-02-27 PROCEDURE — 52356 CYSTO/URETERO W/LITHOTRIPSY: CPT | Performed by: UROLOGY

## 2025-02-27 PROCEDURE — C1894 INTRO/SHEATH, NON-LASER: HCPCS | Performed by: UROLOGY

## 2025-02-27 PROCEDURE — C1769 GUIDE WIRE: HCPCS | Performed by: UROLOGY

## 2025-02-27 PROCEDURE — 74420 UROGRAPHY RTRGR +-KUB: CPT

## 2025-02-27 DEVICE — INLAY OPTIMA URETERAL STENT W/O GUIDEWIRE
Type: IMPLANTABLE DEVICE | Site: URETER | Status: FUNCTIONAL
Brand: BARD® INLAY OPTIMA® URETERAL STENT

## 2025-02-27 RX ORDER — FENTANYL CITRATE 50 UG/ML
INJECTION, SOLUTION INTRAMUSCULAR; INTRAVENOUS AS NEEDED
Status: DISCONTINUED | OUTPATIENT
Start: 2025-02-27 | End: 2025-02-27

## 2025-02-27 RX ORDER — DIPHENHYDRAMINE HYDROCHLORIDE 50 MG/ML
12.5 INJECTION INTRAMUSCULAR; INTRAVENOUS ONCE AS NEEDED
Status: DISCONTINUED | OUTPATIENT
Start: 2025-02-27 | End: 2025-02-27 | Stop reason: HOSPADM

## 2025-02-27 RX ORDER — HYDROMORPHONE HCL/PF 1 MG/ML
0.5 SYRINGE (ML) INJECTION
Status: DISCONTINUED | OUTPATIENT
Start: 2025-02-27 | End: 2025-02-27 | Stop reason: HOSPADM

## 2025-02-27 RX ORDER — SODIUM CHLORIDE, SODIUM LACTATE, POTASSIUM CHLORIDE, CALCIUM CHLORIDE 600; 310; 30; 20 MG/100ML; MG/100ML; MG/100ML; MG/100ML
100 INJECTION, SOLUTION INTRAVENOUS CONTINUOUS
Status: DISCONTINUED | OUTPATIENT
Start: 2025-02-27 | End: 2025-02-27 | Stop reason: HOSPADM

## 2025-02-27 RX ORDER — OXYBUTYNIN CHLORIDE 5 MG/1
5 TABLET, EXTENDED RELEASE ORAL DAILY PRN
Qty: 30 TABLET | Refills: 0 | Status: SHIPPED | OUTPATIENT
Start: 2025-02-27

## 2025-02-27 RX ORDER — FENTANYL CITRATE/PF 50 MCG/ML
25 SYRINGE (ML) INJECTION
Status: DISCONTINUED | OUTPATIENT
Start: 2025-02-27 | End: 2025-02-27 | Stop reason: HOSPADM

## 2025-02-27 RX ORDER — EPHEDRINE SULFATE 50 MG/ML
INJECTION INTRAVENOUS AS NEEDED
Status: DISCONTINUED | OUTPATIENT
Start: 2025-02-27 | End: 2025-02-27

## 2025-02-27 RX ORDER — LIDOCAINE HYDROCHLORIDE 10 MG/ML
INJECTION, SOLUTION EPIDURAL; INFILTRATION; INTRACAUDAL; PERINEURAL AS NEEDED
Status: DISCONTINUED | OUTPATIENT
Start: 2025-02-27 | End: 2025-02-27

## 2025-02-27 RX ORDER — PROPOFOL 10 MG/ML
INJECTION, EMULSION INTRAVENOUS AS NEEDED
Status: DISCONTINUED | OUTPATIENT
Start: 2025-02-27 | End: 2025-02-27

## 2025-02-27 RX ORDER — ACETAMINOPHEN 325 MG/1
650 TABLET ORAL EVERY 6 HOURS PRN
Status: DISCONTINUED | OUTPATIENT
Start: 2025-02-27 | End: 2025-02-27 | Stop reason: HOSPADM

## 2025-02-27 RX ORDER — ACETAMINOPHEN 10 MG/ML
1000 INJECTION, SOLUTION INTRAVENOUS ONCE
Status: COMPLETED | OUTPATIENT
Start: 2025-02-27 | End: 2025-02-27

## 2025-02-27 RX ORDER — OXYBUTYNIN CHLORIDE 5 MG/1
5 TABLET, EXTENDED RELEASE ORAL DAILY PRN
Status: DISCONTINUED | OUTPATIENT
Start: 2025-02-27 | End: 2025-02-27 | Stop reason: HOSPADM

## 2025-02-27 RX ORDER — CEFAZOLIN SODIUM 2 G/50ML
2000 SOLUTION INTRAVENOUS ONCE
Status: COMPLETED | OUTPATIENT
Start: 2025-02-27 | End: 2025-02-27

## 2025-02-27 RX ORDER — DEXAMETHASONE SODIUM PHOSPHATE 10 MG/ML
INJECTION, SOLUTION INTRAMUSCULAR; INTRAVENOUS AS NEEDED
Status: DISCONTINUED | OUTPATIENT
Start: 2025-02-27 | End: 2025-02-27

## 2025-02-27 RX ORDER — ONDANSETRON 2 MG/ML
INJECTION INTRAMUSCULAR; INTRAVENOUS AS NEEDED
Status: DISCONTINUED | OUTPATIENT
Start: 2025-02-27 | End: 2025-02-27

## 2025-02-27 RX ORDER — TAMSULOSIN HYDROCHLORIDE 0.4 MG/1
0.4 CAPSULE ORAL
Qty: 30 CAPSULE | Refills: 0 | Status: SHIPPED | OUTPATIENT
Start: 2025-02-27

## 2025-02-27 RX ORDER — ONDANSETRON 2 MG/ML
4 INJECTION INTRAMUSCULAR; INTRAVENOUS ONCE AS NEEDED
Status: DISCONTINUED | OUTPATIENT
Start: 2025-02-27 | End: 2025-02-27 | Stop reason: HOSPADM

## 2025-02-27 RX ADMIN — CEFAZOLIN SODIUM 2000 MG: 2 SOLUTION INTRAVENOUS at 09:52

## 2025-02-27 RX ADMIN — EPHEDRINE SULFATE 10 MG: 50 INJECTION, SOLUTION INTRAVENOUS at 10:48

## 2025-02-27 RX ADMIN — LIDOCAINE HYDROCHLORIDE 50 MG: 10 INJECTION, SOLUTION EPIDURAL; INFILTRATION; INTRACAUDAL; PERINEURAL at 09:56

## 2025-02-27 RX ADMIN — PROPOFOL 250 MG: 10 INJECTION, EMULSION INTRAVENOUS at 09:56

## 2025-02-27 RX ADMIN — FENTANYL CITRATE 25 MCG: 50 INJECTION INTRAMUSCULAR; INTRAVENOUS at 10:13

## 2025-02-27 RX ADMIN — PROPOFOL 50 MG: 10 INJECTION, EMULSION INTRAVENOUS at 10:50

## 2025-02-27 RX ADMIN — SODIUM CHLORIDE, SODIUM LACTATE, POTASSIUM CHLORIDE, AND CALCIUM CHLORIDE 100 ML/HR: .6; .31; .03; .02 INJECTION, SOLUTION INTRAVENOUS at 08:32

## 2025-02-27 RX ADMIN — FENTANYL CITRATE 25 MCG: 50 INJECTION INTRAMUSCULAR; INTRAVENOUS at 10:14

## 2025-02-27 RX ADMIN — PROPOFOL 50 MG: 10 INJECTION, EMULSION INTRAVENOUS at 10:41

## 2025-02-27 RX ADMIN — PROPOFOL 50 MG: 10 INJECTION, EMULSION INTRAVENOUS at 10:35

## 2025-02-27 RX ADMIN — PHENYLEPHRINE HYDROCHLORIDE 200 MCG: 10 INJECTION INTRAVENOUS at 10:19

## 2025-02-27 RX ADMIN — ONDANSETRON 4 MG: 2 INJECTION INTRAMUSCULAR; INTRAVENOUS at 09:56

## 2025-02-27 RX ADMIN — EPHEDRINE SULFATE 10 MG: 50 INJECTION, SOLUTION INTRAVENOUS at 10:26

## 2025-02-27 RX ADMIN — EPHEDRINE SULFATE 10 MG: 50 INJECTION, SOLUTION INTRAVENOUS at 10:44

## 2025-02-27 RX ADMIN — FENTANYL CITRATE 25 MCG: 50 INJECTION INTRAMUSCULAR; INTRAVENOUS at 09:56

## 2025-02-27 RX ADMIN — PHENYLEPHRINE HYDROCHLORIDE 200 MCG: 10 INJECTION INTRAVENOUS at 10:23

## 2025-02-27 RX ADMIN — FENTANYL CITRATE 25 MCG: 50 INJECTION INTRAMUSCULAR; INTRAVENOUS at 09:48

## 2025-02-27 RX ADMIN — DEXAMETHASONE SODIUM PHOSPHATE 10 MG: 10 INJECTION, SOLUTION INTRAMUSCULAR; INTRAVENOUS at 09:56

## 2025-02-27 RX ADMIN — ACETAMINOPHEN 1000 MG: 10 INJECTION INTRAVENOUS at 11:08

## 2025-02-27 RX ADMIN — PHENYLEPHRINE HYDROCHLORIDE 200 MCG: 10 INJECTION INTRAVENOUS at 10:05

## 2025-02-27 NOTE — OP NOTE
OPERATIVE REPORT  PATIENT NAME: Syed Reese    :  1955  MRN: [id number]  Pt Location: BE CYSTO ROOM 01    SURGERY DATE: 2025    Surgeons and Role:     * Puneet Montenegro DO - Primary     * Katarzyna Burt MD - Assisting    Preop Diagnosis:  Left ureteral stone [N20.1]    Post-Op Diagnosis Codes:     * Left ureteral stone [N20.1]    Procedure(s):      Cystourethroscopy  Left retrograde pyelogram with live fluoroscopic interpretation  Left ureteroscopy  Laser lithotripsy  Stone basket extraction  Left ureteral stent placement        Specimen(s):  ID Type Source Tests Collected by Time Destination   A : Left Ureter Stone Calculus Ureter, Left STONE ANALYSIS Puneet Montenegro DO 2025 1046        Estimated Blood Loss:   Minimal    Drains:  * No LDAs found *    Anesthesia Type:   General    Operative Indications:  Left ureteral stone [N20.1]        69 y.o. old male with urolithiasis     CT abdomen pelvis without contrast 2025: 9 mm nonobstructing stone in the left UPJ; 3 mm nonobstructing stone in the distal left ureter; no left hydronephrosis     Patient was seen in the office and imaging was reviewed with him on 2025.  He opted for elective stone treatment.     He was consented for cystoscopy, left retrograde pyelogram, left ureteroscopy, laser lithotripsy, stone basket traction, left ureteral stent placement          Operative Findings:          No meatal stenosis  No urethral strictures  Prostate moderately enlarged.  Enlarged median lobe with some intravesical component which made identifying the bilateral ureteral orifices somewhat difficult.  Bilateral ureteral orifices in orthotopic positions  No bladder lesions  No stones in bladder  Moderate trabeculations  No diverticuli  Left retrograde pyelogram: Mild to moderate hydronephrosis, no filling defects in the kidney  Left pyeloscopy: 1 cm stone was noted in the upper pole.  This was the stone that was previously at  the UPJ and was pushed into the kidney by the wire.  Stone was dusted using 272 µm laser fiber.  Back out ureteroscopy: Significant urothelial inflammation at the ureteropelvic junction at site of previous stone impaction; area of urothelial splitting at the distal ureter; 3 mm stone at the distal ureter that was removed using Skylight basket  Successfully placed 6 x 26 double-J ureteral stent with no string attached                Complications:   None    Procedure and Technique:             Patient identified in the preop holding area.  Consent was obtained.  Risks and benefits of the procedure were explained to the patient.  Patient was in agreement.  Patient was brought back to the OR and placed upon the table.  Bilateral lower extremity SCDs were placed and turned on.  Patient received IV Ancef for antibiotics.  Patient underwent smooth induction of anesthesia.  Bilateral lower extremities were placed in stirrups.  Patient was in dorsal lithotomy position.  Area was prepped and draped in sterile fashion.  Timeout was performed by the OR team.     Case began with insertion of 21 [place] rigid cystoscope.  Pan cystourethroscopy was performed.  See the above findings for details.     Attention was turned toward the left ureteral orifice.      5 [place] open-ended catheter was advanced through the bridge of the scope toward the ureteral orifice.  Guidewire was advanced through the 5 [place] open-ended catheter, into the ureter, and coiled in renal pelvis under fluoroscopic guidance.      Dual-lumen catheter was advanced over the wire and into the ureter under fluoroscopic guidance.  Retrograde pyelogram was performed at this time.  See the above findings for details.  Second wire was placed through the dual-lumen catheter and coiled into the renal pelvis under fluoroscopic guidance.  Dual-lumen catheter was removed over the 2 wires in a push pull fashion.    It was decided that an access sheath would be utilized.   11-13 Fr 45 cm access sheath was advanced over one of the wires under fluoroscopic guidance toward the proximal ureter.  Wire and inner sheath were removed. Flexible reusable ureteroscope was assembled.  Scope was advanced into the renal pelvis.  Pyeloscopy was performed.  See the above findings for details regarding stone works.    Backout flexible ureteroscopy was then performed.  See above findings for details.    The ureteroscope was then removed from the bladder and urethra.     Cystoscope was reentered into the bladder over the wire toward the left ureteral orifice.  6 x 26 double-J ureteral stent was advanced over the wire toward the renal pelvis under fluoroscopic guidance.  Wire was removed and stent was deployed.  Good proximal curl was seen on fluoroscopy.  Good distal curl was seen on direct cystoscopy.      There was no string left on the stent.    Bladder was emptied and scope was removed.     Patient was uneventfully awoken from anesthesia and extubated.  Patient was brought to PACU in good condition.               A qualified resident physician was not available.    Patient Disposition:  PACU              SIGNATURE: Puneet Montenegro DO  DATE: February 27, 2025  TIME: 10:56 AM      Plan  - Left-sided 6 x 26 double-J ureteral stent in place.  Patient will follow-up for cystoscopy, stent removal in about 2 weeks.

## 2025-02-27 NOTE — TELEPHONE ENCOUNTER
Patient status post left ureteroscopy at [facility] on 2/27/2025    Patient has left ureteral stent in place.  6 x 26 double-J.  No string.    Patient needs cystoscopy, stent removal in the office in about 2 weeks

## 2025-02-27 NOTE — ANESTHESIA POSTPROCEDURE EVALUATION
Post-Op Assessment Note    CV Status:  Stable    Pain management: adequate       Mental Status:  Alert and awake   Hydration Status:  Euvolemic   PONV Controlled:  Controlled   Airway Patency:  Patent     Post Op Vitals Reviewed: Yes    No anethesia notable event occurred.    Staff: Anesthesiologist           Last Filed PACU Vitals:  Vitals Value Taken Time   Temp 99.8 °F (37.7 °C) 02/27/25 1101   Pulse 107 02/27/25 1104   /84 02/27/25 1101   Resp 24 02/27/25 1104   SpO2 92 % 02/27/25 1104   Vitals shown include unfiled device data.

## 2025-02-27 NOTE — DISCHARGE INSTR - AVS FIRST PAGE
Mr. Reese,      I found the 2 stones that were seen in the ureter on your CT scan.  The one stone I was able to decimated into tiny pieces.  You will therefore pass what looks like little grains of sand in your urine alongside your stent for the next few days.  The other stone was small enough that I was able to remove it in its entirety with a basket.    Due to the heavy amount of inflammation in your ureter, I opted not to leave a string in your stent.  He will therefore need an appointment in about 2 weeks to have your stent removed in the office.    Please see the postoperative instructions below for additional details.      Sincerely,  Puneet Hidalgoshellirosalba          You had procedure on your ureter and kidney.      You had ureteral stent placed.  This is a tube that is placed in your ureter to keep urine draining from your kidney to your bladder.  It may cause discomfort in the form of back spasms or lower abdominal spasms.  This is normal and can be treated with medications if need be.      You may see some blood in your urine.  This is normal.  Please drink plenty of fluids.  Generally speaking, as long as your urine is fruit punch color or lighter, that is okay.  You may even pass small blood clots in your urine.  That is also okay.  If you notice that you are passing large blood clots, if it is becoming more difficult to urinate, or if your urine is very dark like the color of Merlot wine, please call the office for further instruction.      You have been given a prescription for Flomax.  Please take this daily while your ureteral stent is in place. This medication can occasionally cause lightheadedness. Please stop medication if you experience this or any other concerning side effects.      You have been given a prescription for oxybutynin.  Please take this daily as needed for stent discomfort. Please note that this medication may have side effects including but not limited to: dry eyes, dry mouth, constipation,  urinary retention. Please drink plenty of water with this medication.      You may take Tylenol as needed for pain.      Pain control plan: Having a ureteral stent is often very uncomfortable. In order to stay on top of your pain, please take over the counter Tylenol around the clock. Additionally take Flomax daily with your stent in place whether or not you are having discomfort. Additionally if you are having discomfort, you need to take your oxybutynin daily, as this helps a lot with bladder spasms and stent discomfort.     Most importantly, please drink lots of fluids, as this is the best way to avoid pain with your stent!        You may shower and bathe as usual when you get home.    You do not have any incisions and can resume typical physical activities, as long as you are not having too much discomfort with stent in place.    Please call the office or present to the ED if you experience concerning signs or symptoms including but not limited to: fevers, chills, nausea, vomiting, worsening flank pain, worsening abdominal pain, passage of large blood clots in the urine, inability to urinate.    You will follow up in the office in about 2 weeks to have your stent removed. Office will call you with details.

## 2025-02-27 NOTE — ANESTHESIA PREPROCEDURE EVALUATION
Procedure:  CYSTOSCOPY URETEROSCOPY WITH LITHOTRIPSY HOLMIUM LASER, RETROGRADE PYELOGRAM AND INSERTION STENT URETERAL (Left: Bladder)  CYSTOSCOPY WITH BIOPSIES (Bladder)    Relevant Problems   ANESTHESIA (within normal limits)      CARDIO   (+) Aortic root dilation (HCC)   (+) Essential hypertension   (+) Mixed hyperlipidemia      ENDO   (+) Type 2 diabetes mellitus with diabetic neuropathy, without long-term current use of insulin (HCC)   (+) Uncontrolled type 2 diabetes mellitus with hyperglycemia (HCC)      HEMATOLOGY   (+) Thrombocytopenia (HCC)      NEURO/PSYCH   (+) Chronic tension-type headache, not intractable   (+) Type 2 diabetes mellitus with diabetic neuropathy, without long-term current use of insulin (HCC)        Physical Exam    Airway    Mallampati score: III  TM Distance: <3 FB  Neck ROM: full     Dental        Cardiovascular      Pulmonary      Other Findings        Anesthesia Plan  ASA Score- 3     Anesthesia Type- general with ASA Monitors.         Additional Monitors:     Airway Plan: LMA.           Plan Factors-Exercise tolerance (METS): >4 METS.    Chart reviewed. EKG reviewed.  Existing labs reviewed. Patient summary reviewed.    Patient is not a current smoker.              Induction- intravenous.    Postoperative Plan- Plan for postoperative opioid use.         Informed Consent- Anesthetic plan and risks discussed with patient.        NPO Status:  Vitals Value Taken Time   Date of last liquid 02/26/25 02/27/25 0803   Time of last liquid 2355 02/27/25 0803   Date of last solid 02/26/25 02/27/25 0803   Time of last solid 1830 02/27/25 0803

## 2025-02-27 NOTE — H&P
UROLOGY H&P NOTE     Patient Identifiers: Syed Reese (MRN [id number])    Date of Service: 2/27/2025    History of Present Illness:     Syed Reese is a 69 y.o. old with a history of urolithiasis    Past Medical, Past Surgical History:     Past Medical History:   Diagnosis Date    Chronic kidney disease     Cyst of skin 04/29/2021    Behind right ear    Diabetes mellitus (HCC)     Epithelial inclusion cyst 07/07/2021   :    Past Surgical History:   Procedure Laterality Date    CYST REMOVAL Right 04/29/2021    Excision of skin cyst from behind right ear.  Office procedure   :    Medications, Allergies:     Current Facility-Administered Medications:     ceFAZolin (ANCEF) IVPB (premix in dextrose) 2,000 mg 50 mL, Once    lactated ringers infusion, Continuous, Last Rate: 100 mL/hr (02/27/25 0832)    Allergies:  No Known Allergies:    Social and Family History:   Social History:   Social History     Tobacco Use    Smoking status: Never    Smokeless tobacco: Never   Vaping Use    Vaping status: Never Used   Substance Use Topics    Alcohol use: Yes     Alcohol/week: 1.0 standard drink of alcohol     Types: 1 Standard drinks or equivalent per week     Comment: not much    Drug use: No   .    Social History     Tobacco Use   Smoking Status Never   Smokeless Tobacco Never       Family History:  Family History   Problem Relation Age of Onset    Hypertension Father     Colon polyps Paternal Grandmother     Colon cancer Neg Hx    :     Review of Systems:     General: Fever, chills, or night sweats: negative  Cardiac: Negative for chest pain.    Pulmonary: Negative for shortness of breath.  Gastrointestinal: Abdominal pain negative.  Nausea, vomiting, or diarrhea negative,  Genitourinary: See HPI above.  Patient does not have hematuria.  All other systems queried were negative.    Physical Exam:   General: Patient is pleasant and in NAD. Awake and alert  BP (!) 159/109   Pulse 90   Temp (!) 97 °F (36.1 °C) (Temporal)   Resp 18  "  Ht 5' 10.5\" (1.791 m)   Wt 107 kg (236 lb)   SpO2 95%   BMI 33.38 kg/m² Temp (24hrs), Av °F (36.1 °C), Min:97 °F (36.1 °C), Max:97 °F (36.1 °C)  current; Temperature: (!) 97 °F (36.1 °C)  No intake/output data recorded.  Cardiac: Peripheral edema: negative  Pulmonary: Non-labored breathing  Abdomen: Soft, non-tender, non-distended.  No surgical scars.  No masses, tenderness, hernias noted.    Genitourinary: Negative CVA tenderness, negative suprapubic tenderness.    Cardiac exam: Heart not tachycardic  Breath sounds bilaterally  No significant lower extremity edema bilaterally      Labs:     Lab Results   Component Value Date    HGB 14.3 2024    HCT 40.9 2024    WBC 8.23 2024     2024   ]    Lab Results   Component Value Date    K 4.1 2024     2024    CO2 26 2024    BUN 17 2024    CREATININE 1.32 (H) 2024    CALCIUM 8.6 2024   ]    Imaging:     Any pertinent imaging was personally reviewed and discussed with patient. See below for details.    ASSESSMENT:     69 y.o. old male with urolithiasis    CT abdomen pelvis without contrast 2025: 9 mm nonobstructing stone in the left UPJ; 3 mm nonobstructing stone in the distal left ureter; no left hydronephrosis    Patient was seen in the office and imaging was reviewed with him on 2025.  He opted for elective stone treatment.    He was consented for cystoscopy, left retrograde pyelogram, left ureteroscopy, laser lithotripsy, stone basket traction, left ureteral stent placement        We discussed ureteroscopy procedure.    I explained that ureteroscopy consisted of patient going to sleep and having scope initially placed into the urethra and bladder.  From there, x-rays would be shot in the ureter and kidney in order to assess for safety wire placement and stone location.  At that point, if possible, a smaller scope would be placed into the ureter and/or kidney to look for stones.  " "Once the stones were located, they would either be removed with a basket, or lasered into smaller pieces.  Once they were broken up into smaller pieces, they would either be \"dusted\" into very small fragments that would pass on their own or \"fragmented\" into slightly larger pieces that would be able to be removed in their entirety with the basket.    I explained that after the procedure, most patients needed placement of a ureteral stent.  I explained that ureteral stent is essentially a straw with 2 curly ends. One curl is in the kidney and the other curl is in the bladder.  The stent would allow urine to safely pass from the kidney to the bladder without obstruction.  I explained that in the vast majority of circumstances, the placement of the ureteral stent was not permanent.  I explained that sometimes we are able to leave a string coming out of the end of the stent which comes out of the urethra.  I explained that these types of patients will get instructions in their discharge packet which tells them when to pull the string and remove the stent in its entirety.  I also explained that there are circumstances where we are not able to do this and there is no string coming off the stent.  In these cases, the patient's would need to come to the office for a quick 2-minute procedure called cystoscopy, removal of ureteral stent.    I also explained that there are unfortunately times where we are not able to get up with a smaller scope into the ureter.  In the circumstances, we place ureteral stent and have patient come back to the OR a few weeks later so that the ureter is more dilated and can better tolerate a scope.    I explained that based on data from the urine culture and the appearance of the urine during the case, the patient may or may not be discharged with antibiotics after the procedure.    I did explain that having ureteral stent in place can be quite uncomfortable for some patients.  I explained that I " do not routinely give patient's narcotics for this type of procedure.  The patient verbalized understanding.  I did state that I often prescribe medications to help with stent discomfort including but not limited to: Daily tamsulosin 0.4 mg, daily as needed oxybutynin 5 mg XR, as needed diclofenac 50 mg twice daily.  I also told the patient that I often have patients use over-the-counter Tylenol around-the-clock for the first few days.  I also explained the most important thing for pain often is staying very well-hydrated and that patients should plan to drink plenty of water after the procedure.    I explained that it can be normal to have blood in urine after this procedure.  I explained that as long as the urine was lighter than fruit punch and that there were no blood clots being passed, and that the patient was able to urinate, nothing urgently needed to be done about blood in the urine.  I did explain that it was very important to stay hydrated after the procedure.    We also went over the risk of infection from the procedure.  I explained that if the urine did appear to have a very infected appearance, there are times where we have to simply place a ureteral stent and defer definitive stone management a few weeks later after the patient gets additional antibiotics.  I also explained that there is a low, but nonzero risk of developing a serious infection after stone treatment which would potentially require hospitalization and IV antibiotics.    I also explained that there was a risk of injury to urethra, bladder, ureter, kidneys during the procedure.  I explained that if there appeared to be any serious injury to the urethra bladder, we would likely just leave García catheter for a number of days.  I explained that if there appeared to be extensive injury to the ureter or kidney, we often would elect to leave a ureteral stent in place for a longer period of time.  I explained that in extremely rare  circumstances, there would be a severe injury to the ureter or kidney which would actually require placement of percutaneous nephrostomy tube in order for proper drainage of the urine.    I also explained that there are of course risks to getting general anesthesia such as cardiac or pulmonary complications.  I also explained that there could be risk of developing blood clots.    Additionally, we did review alternative treatments which include extracorporal shockwave lithotripsy, percutaneous nephrolithotomy, observation.  We discussed the risks and benefits of each of these strategies.          PLAN:     Preoperative Ancef  OR plan as above  Discharge home from PACU

## 2025-03-05 NOTE — TELEPHONE ENCOUNTER
Patient has burning while urinating for the past 2 days, wanted to know if that is normal. Also, can not come in on 3/12 because he'll be away from 3/10 to 3/17. When should he come to get stent removed, before or after he leaves?    CB: [phone]

## 2025-03-06 NOTE — TELEPHONE ENCOUNTER
Please let patient know that he may experience dysuria, frequency, urgency, or flank pain due to stent colic.  Should have stent removal in about 2 weeks.  He can be rescheduled for when he returns from his trip on 3/17

## 2025-03-06 NOTE — TELEPHONE ENCOUNTER
Called Surinder back and his is aware of new appointment - however - he would like it our before he goes non his trip as [facility] told him I would look

## 2025-03-08 LAB
COLOR STONE: NORMAL
COM MFR STONE: 100 %
COMMENT-STONE3: NORMAL
COMPOSITION: NORMAL
LABORATORY COMMENT REPORT: NORMAL
PHOTO: NORMAL
SIZE STONE: NORMAL MM
SPEC SOURCE SUBJ: NORMAL
STONE ANALYSIS-IMP: NORMAL
STONE ANALYSIS-IMP: NORMAL
WT STONE: 24 MG

## 2025-03-17 ENCOUNTER — PROCEDURE VISIT (OUTPATIENT)
Dept: UROLOGY | Facility: AMBULATORY SURGERY CENTER | Age: 70
End: 2025-03-17
Payer: MEDICARE

## 2025-03-17 VITALS
WEIGHT: 236 LBS | SYSTOLIC BLOOD PRESSURE: 154 MMHG | DIASTOLIC BLOOD PRESSURE: 100 MMHG | HEIGHT: 71 IN | OXYGEN SATURATION: 98 % | BODY MASS INDEX: 33.04 KG/M2 | HEART RATE: 95 BPM

## 2025-03-17 DIAGNOSIS — N20.1 LEFT URETERAL STONE: Primary | ICD-10-CM

## 2025-03-17 PROCEDURE — 52310 CYSTOSCOPY AND TREATMENT: CPT

## 2025-03-17 PROCEDURE — 99024 POSTOP FOLLOW-UP VISIT: CPT

## 2025-03-17 NOTE — PROGRESS NOTES
"3/17/2025      Assessment and Plan    69 y.o. male managed by our office    Left ureteral stone  Status post cystoscopy, left uteroscopy with laser lithotripsy, basket stone extraction, retrograde pyelogram, and left ureteral stent placement performed 2/27/2025  Patient return to the office today to undergo cystoscopy with unilateral stent removal.  The patient was prepped in sterile fashion and the cystoscope was passed through the urethral meatus into the prostatic channel and into the bladder.  The left ureteral stent was visualized exiting the left ureter and graspers were threaded through the cystoscope and used to grasp the ureteral stent.  It was removed in its entirety without complication.  The patient tolerated the procedure well.  We discussed that the patient should plan to undergo an ultrasound of the kidney and bladder in 1 month with follow-up thereafter to reevaluate stone burden and discuss further prevention of kidney stones.       Cystoscopy     Date/Time  3/17/2025 11:40 AM     Performed by  Conrado Franklin PA-C   Authorized by  Conrado Franklin PA-C     Universal Protocol:  procedure performed by consultantConsent: Verbal consent obtained. Written consent obtained.  Risks and benefits: risks, benefits and alternatives were discussed  Consent given by: patient  Time out: Immediately prior to procedure a \"time out\" was called to verify the correct patient, procedure, equipment, support staff and site/side marked as required.  Patient understanding: patient states understanding of the procedure being performed  Patient consent: the patient's understanding of the procedure matches consent given  Procedure consent: procedure consent matches procedure scheduled  Relevant documents: relevant documents present and verified  Test results: test results available and properly labeled  Patient identity confirmed: verbally with patient      Procedure Details:  Procedure type: unilateral ureteral stent    Patient " tolerance: Patient tolerated the procedure well with no immediate complications    Additional Procedure Details: The patient returns to the office today to undergo cystoscopy with left stent removal. Risk and benefits of the procedure were discussed and informed consent was obtained.  The patient was placed in the modified supine position. The genitalia were prepped and draped in a sterile fashion. Viscous lidocaine was used for local anesthesia. The flexible cystoscope was passed. The bladder was inspected. The stent was identified coming from the left ureteral orifice. The stent was grasped with a flexible grasper and was then removed in its entirety without complications. Overall the patient tolerated the procedure.      They were made aware to advise our office of any fevers greater than 101 degrees Fahrenheit, malaise, or chills.  They were advised that it is normal to have cramping pain on the ipsilateral side for a day or so after ureteral stent removal.  They are to remain well hydrated in the coming days.        History of Present Illness  Syed Reese is a 69 y.o. male here for evaluation of ureteral calculi.  Patient was seen in the office on 1/21/2025 in regards to an obstructing 7 mm ureteral calculi.  Patient noted at that office visit that he was currently asymptomatic and believes that he passed the stone.  CT renal stone study performed 1/28/2025 was obtained to determine if the patient did passed a stone, but CT revealed a 3 mm nonobstructing calcification in the distal left ureter and a 9 mm nonobstructing calcification in the left UPJ.  Furthermore, CT scan noted enlargement of the prostate gland with protrusion and mild diffuse urinary bladder wall thickening.  Patient underwent cystoscopy, you left uteroscopy with laser lithotripsy, retrograde pyelogram, and left ureteral stent placement on 2/27/2025.  Patient was noted to have prostatomegaly, and an enlarged median lobe with an intravesical  "component, but no bladder lesions or bladder stones.  Patient returns to the office today to undergo cystoscopy with left ureteral stent removal.  Today, the patient reports that he has experienced significant urinary frequency and urgency as well as urinary urge incontinence following has procedure.  However, the patient denies dysuria, hematuria, flank pain, or feelings of incomplete bladder emptying.        Review of Systems   Constitutional:  Negative for chills and fever.   HENT:  Negative for ear pain and sore throat.    Eyes:  Negative for pain and visual disturbance.   Respiratory:  Negative for cough and shortness of breath.    Cardiovascular:  Negative for chest pain and palpitations.   Gastrointestinal:  Negative for abdominal pain and vomiting.   Genitourinary:  Positive for frequency and urgency. Negative for decreased urine volume, difficulty urinating, dysuria, flank pain and hematuria.   Musculoskeletal:  Negative for arthralgias and back pain.   Skin:  Negative for color change and rash.   Neurological:  Negative for seizures and syncope.   All other systems reviewed and are negative.               Vitals  Vitals:    03/17/25 1143   BP: 154/100   BP Location: Left arm   Patient Position: Sitting   Cuff Size: Standard   Pulse: 95   SpO2: 98%   Weight: 107 kg (236 lb)   Height: 5' 10.5\" (1.791 m)       Physical Exam  Vitals reviewed.   Constitutional:       General: He is not in acute distress.     Appearance: Normal appearance. He is not ill-appearing.   HENT:      Head: Normocephalic and atraumatic.      Nose: Nose normal.   Eyes:      General: No scleral icterus.  Pulmonary:      Effort: No respiratory distress.   Abdominal:      General: Abdomen is flat. There is no distension.      Palpations: Abdomen is soft.      Tenderness: There is no abdominal tenderness.   Musculoskeletal:         General: Normal range of motion.      Cervical back: Normal range of motion.   Skin:     General: Skin is warm. "      Coloration: Skin is not jaundiced.   Neurological:      Mental Status: He is alert and oriented to person, place, and time.      Gait: Gait normal.   Psychiatric:         Mood and Affect: Mood normal.         Behavior: Behavior normal.           Past History  Past Medical History:   Diagnosis Date    Chronic kidney disease     Cyst of skin 04/29/2021    Behind right ear    Diabetes mellitus (HCC)     Epithelial inclusion cyst 07/07/2021     Social History     Socioeconomic History    Marital status: Single     Spouse name: None    Number of children: None    Years of education: None    Highest education level: None   Occupational History    None   Tobacco Use    Smoking status: Never    Smokeless tobacco: Never   Vaping Use    Vaping status: Never Used   Substance and Sexual Activity    Alcohol use: Yes     Alcohol/week: 1.0 standard drink of alcohol     Types: 1 Standard drinks or equivalent per week     Comment: not much    Drug use: No    Sexual activity: None   Other Topics Concern    None   Social History Narrative    None     Social Drivers of Health     Financial Resource Strain: Low Risk  (10/18/2023)    Overall Financial Resource Strain (CARDIA)     Difficulty of Paying Living Expenses: Not hard at all   Food Insecurity: No Food Insecurity (10/28/2024)    Hunger Vital Sign     Worried About Running Out of Food in the Last Year: Never true     Ran Out of Food in the Last Year: Never true   Transportation Needs: No Transportation Needs (10/28/2024)    PRAPARE - Transportation     Lack of Transportation (Medical): No     Lack of Transportation (Non-Medical): No   Physical Activity: Not on file   Stress: Not on file   Social Connections: Not on file   Intimate Partner Violence: Not on file   Housing Stability: Unknown (10/28/2024)    Housing Stability Vital Sign     Unable to Pay for Housing in the Last Year: Not on file     Number of Times Moved in the Last Year: 0     Homeless in the Last Year: No      Social History     Tobacco Use   Smoking Status Never   Smokeless Tobacco Never     Family History   Problem Relation Age of Onset    Hypertension Father     Colon polyps Paternal Grandmother     Colon cancer Neg Hx        The following portions of the patient's history were reviewed and updated as appropriate: allergies, current medications, past medical history, past social history, past surgical history and problem list.    Results  No results found for this or any previous visit (from the past hour).]  Lab Results   Component Value Date    PSA 4.1 (H) 11/21/2024    PSA 2.8 05/20/2020    PSA 2.8 10/22/2018     Lab Results   Component Value Date    CALCIUM 8.6 12/27/2024    K 4.1 12/27/2024    CO2 26 12/27/2024     12/27/2024    BUN 17 12/27/2024    CREATININE 1.32 (H) 12/27/2024     Lab Results   Component Value Date    WBC 8.23 12/27/2024    HGB 14.3 12/27/2024    HCT 40.9 12/27/2024    MCV 91 12/27/2024     12/27/2024

## 2025-03-17 NOTE — ASSESSMENT & PLAN NOTE
Status post cystoscopy, left uteroscopy with laser lithotripsy, basket stone extraction, retrograde pyelogram, and left ureteral stent placement performed 2/27/2025  Patient return to the office today to undergo cystoscopy with unilateral stent removal.  The patient was prepped in sterile fashion and the cystoscope was passed through the urethral meatus into the prostatic channel and into the bladder.  The left ureteral stent was visualized exiting the left ureter and graspers were threaded through the cystoscope and used to grasp the ureteral stent.  It was removed in its entirety without complication.  The patient tolerated the procedure well.  We discussed that the patient should plan to undergo an ultrasound of the kidney and bladder in 1 month with follow-up thereafter to reevaluate stone burden and discuss further prevention of kidney stones.

## 2025-03-18 ENCOUNTER — TELEPHONE (OUTPATIENT)
Dept: UROLOGY | Facility: HOSPITAL | Age: 70
End: 2025-03-18

## 2025-03-18 NOTE — TELEPHONE ENCOUNTER
Patient does not need cystoscopy scheduled with Dr. Fisher for 5/15.  Patient was seen in consultation by Ky who recommended a cystoscopy for evaluation of abnormal bladder wall thickening.  This area was evaluated by Dr. Montenegro during his stone removal procedure.  Patient can keep 5/7 appointment to review ultrasound, but does not need 5/15.    Please ensure that we call the patient so that he is aware of the change.

## 2025-03-18 NOTE — TELEPHONE ENCOUNTER
You saw this PT yesterday and wanted a FU after his US.  FU scheduled for 5/7.  He also has a cysto scheduled in [place] with Dr. Fisher for 5/15.    Is it appropriate to have both ??

## 2025-03-19 NOTE — TELEPHONE ENCOUNTER
Called and to let PT know that he does not need the cysto that was scheduled and will be canceled. This is according to prior note.

## 2025-04-02 DIAGNOSIS — E11.65 UNCONTROLLED TYPE 2 DIABETES MELLITUS WITH HYPERGLYCEMIA (HCC): ICD-10-CM

## 2025-04-07 RX ORDER — PEN NEEDLE, DIABETIC 29 G X1/2"
NEEDLE, DISPOSABLE MISCELLANEOUS
Qty: 100 EACH | Status: SHIPPED | OUTPATIENT
Start: 2025-04-07

## 2025-04-17 ENCOUNTER — HOSPITAL ENCOUNTER (OUTPATIENT)
Dept: ULTRASOUND IMAGING | Facility: HOSPITAL | Age: 70
End: 2025-04-17
Payer: MEDICARE

## 2025-04-17 DIAGNOSIS — N20.1 LEFT URETERAL STONE: ICD-10-CM

## 2025-04-17 PROCEDURE — 76775 US EXAM ABDO BACK WALL LIM: CPT

## 2025-04-21 ENCOUNTER — TELEPHONE (OUTPATIENT)
Dept: FAMILY MEDICINE CLINIC | Facility: CLINIC | Age: 70
End: 2025-04-21

## 2025-04-21 DIAGNOSIS — R05.1 ACUTE COUGH: Primary | ICD-10-CM

## 2025-04-21 RX ORDER — PROMETHAZINE HYDROCHLORIDE AND CODEINE PHOSPHATE 6.25; 1 MG/5ML; MG/5ML
5 SYRUP ORAL EVERY 4 HOURS PRN
Qty: 180 ML | Refills: 0 | Status: SHIPPED | OUTPATIENT
Start: 2025-04-21

## 2025-04-21 NOTE — TELEPHONE ENCOUNTER
Patient came in stated for about a week has had a head cold with a fever. Patient said that the fever is gone, but started having a cough a couple nights ago that is keeping him up at night. Patient has scheduled for tomorrow morning 4/22, but is requesting if cough medicine with codeine can be prescribed to hold him over for tonight.    Please advise

## 2025-04-22 ENCOUNTER — OFFICE VISIT (OUTPATIENT)
Dept: FAMILY MEDICINE CLINIC | Facility: CLINIC | Age: 70
End: 2025-04-22
Payer: MEDICARE

## 2025-04-22 VITALS
HEART RATE: 71 BPM | WEIGHT: 237 LBS | TEMPERATURE: 96.3 F | OXYGEN SATURATION: 98 % | BODY MASS INDEX: 33.18 KG/M2 | HEIGHT: 71 IN | SYSTOLIC BLOOD PRESSURE: 140 MMHG | DIASTOLIC BLOOD PRESSURE: 88 MMHG

## 2025-04-22 DIAGNOSIS — R05.1 ACUTE COUGH: ICD-10-CM

## 2025-04-22 DIAGNOSIS — J32.9 SINUSITIS, UNSPECIFIED CHRONICITY, UNSPECIFIED LOCATION: Primary | ICD-10-CM

## 2025-04-22 DIAGNOSIS — E66.01 MORBID (SEVERE) OBESITY DUE TO EXCESS CALORIES (HCC): ICD-10-CM

## 2025-04-22 DIAGNOSIS — E11.40 TYPE 2 DIABETES MELLITUS WITH DIABETIC NEUROPATHY, WITHOUT LONG-TERM CURRENT USE OF INSULIN (HCC): ICD-10-CM

## 2025-04-22 PROCEDURE — G2211 COMPLEX E/M VISIT ADD ON: HCPCS

## 2025-04-22 PROCEDURE — 99213 OFFICE O/P EST LOW 20 MIN: CPT

## 2025-04-22 RX ORDER — BENZONATATE 100 MG/1
100 CAPSULE ORAL 3 TIMES DAILY PRN
Qty: 20 CAPSULE | Refills: 0 | Status: SHIPPED | OUTPATIENT
Start: 2025-04-22

## 2025-04-22 RX ORDER — AMOXICILLIN 875 MG/1
875 TABLET, COATED ORAL 2 TIMES DAILY
Qty: 14 TABLET | Refills: 0 | Status: SHIPPED | OUTPATIENT
Start: 2025-04-22 | End: 2025-04-29

## 2025-04-22 NOTE — ASSESSMENT & PLAN NOTE
Lab Results   Component Value Date    HGBA1C 9.5 (A) 10/28/2024   -pt defers A1c today, agreeable to schedule follow up appointment to discuss

## 2025-04-22 NOTE — PROGRESS NOTES
Name: Syed Reese      : 1955      MRN: [id number]  Encounter Provider: Heaven Ardon DO  Encounter Date: 2025   Encounter department: [facility]  :  Assessment & Plan  Sinusitis, unspecified chronicity, unspecified location  -amoxicillin 875mg BID x 7 days, discussed prednisone taper, patient defers at this time  -Supportive care: tylenol/ibuprofen prn pain/fever, maintain adequate hydration, rest, steamy showers/humidifier, honey for sore throat, mucinex, flonase, tessalon perles for cough  -return to office if symptoms fail to improve, worsen   Orders:    amoxicillin (AMOXIL) 875 mg tablet; Take 1 tablet (875 mg total) by mouth 2 (two) times a day for 7 days    benzonatate (TESSALON PERLES) 100 mg capsule; Take 1 capsule (100 mg total) by mouth 3 (three) times a day as needed for cough    Type 2 diabetes mellitus with diabetic neuropathy, without long-term current use of insulin (HCC)    Lab Results   Component Value Date    HGBA1C 9.5 (A) 10/28/2024   -pt defers A1c today, agreeable to schedule follow up appointment to discuss          Morbid (severe) obesity due to excess calories ([facility])           Acute cough    Orders:    benzonatate (TESSALON PERLES) 100 mg capsule; Take 1 capsule (100 mg total) by mouth 3 (three) times a day as needed for cough           History of Present Illness   Acute visit, 1 week of sore throat, nasal congestion, cough productive of thick yellow to green mucous. He did have a fever when symptoms first started. No fever in past 3-4 days. Taking nyquil and advil cold and sinus at home with minimal improvement.     Most bothersome symptom right now is cough, post nasal drip.    Review of Systems   Constitutional:  Negative for chills and fever.   HENT:  Positive for congestion and rhinorrhea. Negative for ear pain and sore throat.    Eyes:  Negative for pain and visual disturbance.   Respiratory:  Positive for cough. Negative for shortness of breath.   "  Cardiovascular:  Negative for chest pain and palpitations.   Gastrointestinal:  Negative for abdominal pain and vomiting.   Genitourinary:  Negative for dysuria and hematuria.   Musculoskeletal:  Negative for arthralgias and back pain.   Skin:  Negative for color change and rash.   Neurological:  Negative for seizures and syncope.   All other systems reviewed and are negative.      Objective   /88 (BP Location: Left arm, Patient Position: Sitting, Cuff Size: Large)   Pulse 71   Temp (!) 96.3 °F (35.7 °C) (Tympanic)   Ht 5' 10.5\" (1.791 m)   Wt 108 kg (237 lb)   SpO2 98%   BMI 33.53 kg/m²      Physical Exam  Constitutional:       General: He is not in acute distress.     Appearance: Normal appearance. He is not ill-appearing or toxic-appearing.   HENT:      Head: Normocephalic and atraumatic.      Right Ear: External ear normal.      Left Ear: External ear normal.      Nose: Congestion present.      Right Turbinates: Swollen.      Left Turbinates: Swollen.      Mouth/Throat:      Pharynx: Postnasal drip present.      Tonsils: No tonsillar exudate or tonsillar abscesses.   Eyes:      Conjunctiva/sclera: Conjunctivae normal.   Cardiovascular:      Rate and Rhythm: Normal rate and regular rhythm.      Heart sounds: Normal heart sounds. No murmur heard.  Pulmonary:      Effort: No respiratory distress.      Breath sounds: Normal breath sounds. No wheezing, rhonchi or rales.   Skin:     General: Skin is warm and dry.   Neurological:      General: No focal deficit present.      Mental Status: He is alert and oriented to person, place, and time.   Psychiatric:         Mood and Affect: Mood normal.         Behavior: Behavior normal.         "

## 2025-05-07 ENCOUNTER — OFFICE VISIT (OUTPATIENT)
Dept: UROLOGY | Facility: AMBULATORY SURGERY CENTER | Age: 70
End: 2025-05-07
Payer: MEDICARE

## 2025-05-07 VITALS
OXYGEN SATURATION: 98 % | BODY MASS INDEX: 32.64 KG/M2 | SYSTOLIC BLOOD PRESSURE: 150 MMHG | HEIGHT: 70 IN | WEIGHT: 228 LBS | HEART RATE: 105 BPM | DIASTOLIC BLOOD PRESSURE: 78 MMHG

## 2025-05-07 DIAGNOSIS — N20.1 LEFT URETERAL STONE: ICD-10-CM

## 2025-05-07 DIAGNOSIS — R97.20 ELEVATED PSA: ICD-10-CM

## 2025-05-07 DIAGNOSIS — N20.0 KIDNEY STONE ON LEFT SIDE: Primary | ICD-10-CM

## 2025-05-07 LAB
SL AMB  POCT GLUCOSE, UA: NORMAL
SL AMB LEUKOCYTE ESTERASE,UA: NORMAL
SL AMB POCT BILIRUBIN,UA: NORMAL
SL AMB POCT BLOOD,UA: NORMAL
SL AMB POCT CLARITY,UA: CLEAR
SL AMB POCT COLOR,UA: YELLOW
SL AMB POCT KETONES,UA: NORMAL
SL AMB POCT NITRITE,UA: NORMAL
SL AMB POCT PH,UA: 5
SL AMB POCT SPECIFIC GRAVITY,UA: 1.01
SL AMB POCT URINE PROTEIN: NORMAL
SL AMB POCT UROBILINOGEN: 0.2

## 2025-05-07 PROCEDURE — 99213 OFFICE O/P EST LOW 20 MIN: CPT

## 2025-05-07 PROCEDURE — 81002 URINALYSIS NONAUTO W/O SCOPE: CPT

## 2025-05-07 NOTE — PROGRESS NOTES
5/7/2025      Assessment and Plan    70 y.o. male managed by our office    Left ureteral stone  Status post uteroscopy laser lithotripsy performed 2/27/2025.  Patient return to the office thereafter to undergo ureteral stent removal, which was completed without any complications.   ultrasound of the kidney and bladder performed 4/17/2025 revealed no further kidney stones bilaterally and resolution of the previously noted hydronephrosis.  Patient's stone returned as calcium oxalate  We discussed dietary modifications for kidney stone prevention.  2 handouts were provided to the patient today's office visit for kidney stone prevention.  We discussed potentially obtaining an ultrasound of the kidney and bladder in 1 year for continued surveillance.  However, the patient defers scheduling an ultrasound at this time.  Patient will follow-up as needed in this regard.    Elevated PSA  Patient denies a known family history for prostate cancer  Patient's last PSA was performed 11/21/2024 and returned elevated a value of 4.1 with a free PSA percentage of 29%  MARIZOL deferred in the office today.  We discussed that the patient's PSA in November was found to be elevated.  We discussed that we should plan to repeat a PSA to determine if this is a transient elevation versus a persistent rise.  Additionally, we discussed potentially obtaining a multiparametric MRI of the prostate and the occurrence that the PSA returns elevated once more.  Patient will undergo repeat PSA testing within the next 2 to 4 weeks and our office will call for results.  If the patient's PSA returns persistently elevated then I would recommend proceeding with a multiparametric MRI of the prostate.  If an MRI of the prostate is pursued and the patient is graded as PI-RADS category 4 or 5 then my recommendation would be to proceed with an MRI fusion transperineal prostate biopsy.    Lab Results   Component Value Date    PSA 4.1 (H) 11/21/2024    PSA 2.8  05/20/2020    PSA 2.8 10/22/2018            History of Present Illness  Syed Reese is a 70 y.o. male here for evaluation of nephrolithiasis and elevated PSA.  Patient was last seen in the office on 3/17/2025 when he underwent cystoscopy and ureteral stent removal.  Patient is status post cystoscopy, left uteroscopy with laser lithotripsy, retrograde pyelogram, and left ureteral stent placement performed 2/27/2025.  Patient return to the office thereafter and underwent ureteral stent removal without any complications.    Patient's last PSA was performed 11/21/2024 and returned elevated a value of 4.1 with a free PSA percentage of 29%.    Patient's stone was sent for analysis and returned as calcium oxalate.  Ultrasound of the kidney and bladder performed 4/17/2025 revealed no further kidney stones bilaterally and resolution of the previously noted hydronephrosis.    Today, the patient reports that he is overall been doing well following stent removal in the office.  Patient denies any new kidney stone episodes.  Patient denies any known family history for prostate cancer.  Patient overall is doing well at today's office visit and denies any new lower urinary tract symptoms.        Review of Systems   Constitutional:  Negative for chills and fever.   HENT:  Negative for ear pain and sore throat.    Eyes:  Negative for pain and visual disturbance.   Respiratory:  Negative for cough and shortness of breath.    Cardiovascular:  Negative for chest pain and palpitations.   Gastrointestinal:  Negative for abdominal pain and vomiting.   Genitourinary:  Negative for decreased urine volume, difficulty urinating, dysuria, flank pain, frequency, hematuria and urgency.   Musculoskeletal:  Negative for arthralgias and back pain.   Skin:  Negative for color change and rash.   Neurological:  Negative for seizures and syncope.   All other systems reviewed and are negative.               Vitals  Vitals:    05/07/25 0818   BP:  "150/78   BP Location: Left arm   Patient Position: Sitting   Cuff Size: Standard   Pulse: 105   SpO2: 98%   Weight: 103 kg (228 lb)   Height: 5' 10\" (1.778 m)       Physical Exam  Vitals reviewed.   Constitutional:       General: He is not in acute distress.     Appearance: Normal appearance. He is not ill-appearing.   HENT:      Head: Normocephalic and atraumatic.      Nose: Nose normal.   Eyes:      General: No scleral icterus.  Pulmonary:      Effort: No respiratory distress.   Abdominal:      General: Abdomen is flat. There is no distension.      Palpations: Abdomen is soft.      Tenderness: There is no abdominal tenderness.   Musculoskeletal:         General: Normal range of motion.      Cervical back: Normal range of motion.   Skin:     General: Skin is warm.      Coloration: Skin is not jaundiced.   Neurological:      Mental Status: He is alert and oriented to person, place, and time.      Gait: Gait normal.   Psychiatric:         Mood and Affect: Mood normal.         Behavior: Behavior normal.           Past History  Past Medical History:   Diagnosis Date    Chronic kidney disease     Cyst of skin 04/29/2021    Behind right ear    Diabetes mellitus (HCC)     Epithelial inclusion cyst 07/07/2021     Social History     Socioeconomic History    Marital status: Single     Spouse name: None    Number of children: None    Years of education: None    Highest education level: None   Occupational History    None   Tobacco Use    Smoking status: Never    Smokeless tobacco: Never   Vaping Use    Vaping status: Never Used   Substance and Sexual Activity    Alcohol use: Yes     Alcohol/week: 1.0 standard drink of alcohol     Types: 1 Standard drinks or equivalent per week     Comment: not much    Drug use: No    Sexual activity: None   Other Topics Concern    None   Social History Narrative    None     Social Drivers of Health     Financial Resource Strain: Low Risk  (10/18/2023)    Overall Financial Resource Strain " (CARDIA)     Difficulty of Paying Living Expenses: Not hard at all   Food Insecurity: No Food Insecurity (10/28/2024)    Hunger Vital Sign     Worried About Running Out of Food in the Last Year: Never true     Ran Out of Food in the Last Year: Never true   Transportation Needs: No Transportation Needs (10/28/2024)    PRAPARE - Transportation     Lack of Transportation (Medical): No     Lack of Transportation (Non-Medical): No   Physical Activity: Not on file   Stress: Not on file   Social Connections: Not on file   Intimate Partner Violence: Not on file   Housing Stability: Unknown (10/28/2024)    Housing Stability Vital Sign     Unable to Pay for Housing in the Last Year: Not on file     Number of Times Moved in the Last Year: 0     Homeless in the Last Year: No     Social History     Tobacco Use   Smoking Status Never   Smokeless Tobacco Never     Family History   Problem Relation Age of Onset    Hypertension Father     Colon polyps Paternal Grandmother     Colon cancer Neg Hx        The following portions of the patient's history were reviewed and updated as appropriate: allergies, current medications, past medical history, past social history, past surgical history and problem list.    Results  No results found for this or any previous visit (from the past hour).  ]  Lab Results   Component Value Date    PSA 4.1 (H) 11/21/2024    PSA 2.8 05/20/2020    PSA 2.8 10/22/2018     Lab Results   Component Value Date    CALCIUM 8.6 12/27/2024    K 4.1 12/27/2024    CO2 26 12/27/2024     12/27/2024    BUN 17 12/27/2024    CREATININE 1.32 (H) 12/27/2024     Lab Results   Component Value Date    WBC 8.23 12/27/2024    HGB 14.3 12/27/2024    HCT 40.9 12/27/2024    MCV 91 12/27/2024     12/27/2024

## 2025-05-07 NOTE — PATIENT INSTRUCTIONS
Dietary Management of Kidney Stone Disease    The dietary recommendations for most people who make kidney stones (especially the most common calcium oxalate stones) are uncomplicated and are not too tedious or bland.  Most importantly, the following recommendations also promote better health for a variety of reasons.    FLUIDS:  The single most important change for the majority patients is the need to greatly increase fluid intake.  You should at least produce two liters (about two quarts) of urine each day.  Depending on the heat outdoors and your level of physical activity, this usually means consuming ten, 10 ounce glasses (100 ounces) of fluid per day.  Water is always a good choice, but other drinks including tea, coffee, soda, and juice are also allowed as long as no one beverage becomes the sole source of fluid.    CALCIUM:  There is excellent evidence that calcium should not be avoided, but instead moderated.  A range of 600 to 1,100 mg of calcium per day, especially consumed at meals is probably a reasonable target. (i.e. 2-3 dairy servings per day) This might include small servings of yogurt, milk or ice cream.  This amount helps avoid over-absorption of oxalate from the digestive tract and also allows for healthy bone maintenance.    SODIUM (SALT):  Too much salt in your diet (both from the shaker and in the prepared foods that we buy) is bad for your blood pressure, bad for your heart, and also increases the amount of calcium in your urine.  A reasonable sodium restriction to 2,000-2,500mg/day (about the amount in one teaspoon) is an excellent target.  You should get into the habit of reading the “Nutrients” labels on all the foods that you eat and watch out for the foods that have a high sodium content (snack foods, smoked or processed foods, caned foods).  Fresh and frozen foods usually have the least amount of sodium.    PROTEIN:  High protein diets from animal meat (beef, chicken, pork, fish) also  "increases the rate of kidney stone formation and is equally unhealthy for your heart.  All patients should moderate their meat intake to 3-7 ounces per day, and particularly stay away from red meat protein.    OXALATE:  Most stone-formers should avoid heavy intake of oxalate-rich foods.  These include green roughage (spinach, mustard, kale), strawberries, chocolate, tea, iced tea, and nuts.  In addition, heavy, excess doses of Vitamin C can also produce surges in urinary oxalate levels and should be avoided.    BARE-BONES RECOMMENDATIONS:  Fluids, fluids, fluids.    Low salt diet (your primary care doctor will love you).  Moderate calcium (dairy products), especially with meals.  Moderate red meat intake.     Patient Education     Kidney stone diet   The Basics   Written by the doctors and editors at [facility]   What are kidney stones? -- Kidney stones are just what they sound like: small stones that form inside the kidneys. They form when salts and minerals that are normally in the urine build up and harden. They can be made of different substances.  Kidney stones usually get carried out of the body when you urinate. But sometimes, they can get stuck on the way out (figure 1). If this happens, it can cause:   Pain in your side, back, or in the lower part of your belly   Blood in the urine (which can make urine pink or red)   Nausea or vomiting   Pain when you urinate   Needing to urinate in a hurry  Why do I need a special diet? -- Depending on what your stones are made of, changing your diet might help lower the chances of new stones forming.  Your doctor or nurse might recommend diet changes as part of your treatment plan if you have:   Calcium oxalate stones - When your body digests certain foods, it makes a waste product called \"oxalate.\" If you have too much oxalate in your urine, crystals can form and stick together to make a kidney stone.   Uric acid stones - When your body digests substances called purines, " "which are found in some foods, it makes a waste product called \"uric acid.\" Kidney stones can form when too much uric acid builds up in your body.   Other types of stones - Your body needs a balance of the right amounts of fluids and minerals to work well. Changes in certain minerals or how much you drink can affect your risk of kidney stones.  What can I eat and drink on a kidney stone diet? -- There is no specific diet plan to prevent kidney stones. Foods that are good to eat for 1 type of kidney stone might not be good to eat with another type of kidney stone. Your diet recommendations might be based on the exact type of kidney stone you have.  General recommendations include:   Eat healthy - Try to eat a healthy diet with plenty of vegetables, fruits, whole grains, and low-fat dairy products. It might help to add extra fruits and vegetables, especially those that are high in potassium.   Get plenty of fluids - Drinking more water throughout the day is one of the best ways to help lower your risk of all types of kidney stones.   Limit salt - Limiting the amount of salt in your diet can also lower the risk of kidney stones. Salt is also called \"sodium.\"  Some foods that are generally OK to eat:   Grains - Whole-grain breads, pastas, cereals, rice, English muffins, and bagels. Cooked hot cereals such as oatmeal and cream of wheat (not instant cereals). Unsalted crackers and snack foods.   Fruits - Most fresh, frozen, or canned fruits in their own juices. Fruit juices without added sugar, cherries, oranges, melons, peaches, pears, kiwi, apples, papaya, bananas. Dried fruit without added sugars.   Vegetables - Fresh or frozen vegetables, canned vegetables without salt, potatoes, tomatoes, squash, bell peppers, beets, carrots, beans.   Dairy - Low-fat or fat-free milk, yogurt, and cheese.   Meats, poultry, seafood, and proteins - Eat a moderate amount of protein. Good sources of protein for most people with kidney " stones include dried beans, peas, lentils, tofu, and walnuts. Other nuts and nut butters might be good sources of protein to eat, based on the kind of kidney stone you have.   Condiments and other foods - Fresh or dried herbs, lemon juice, seasonings without salt, mustard, vinegar, hot sauce.   Fluids - Drink plenty of fluids such as water, unsweetened tea, and coffee.  What foods and drinks should I avoid or limit on a kidney stone diet? -- Depending on what type of kidney stone you have had, limiting certain foods might help lower the risk of new stones forming.  For example:   Calcium oxalate stones - Limit foods and drinks with a lot of oxalate. Examples include spinach, rhubarb, strawberries, chocolate, almonds, peanuts, pecans, beets, tea, whole-wheat products, non-dairy animal proteins like meat and eggs, and foods high in added sucrose and fructose, which are types of sugar. Do not take vitamin C or calcium supplements.   Uric acid stones - Limit foods with purines. Examples include oats, whole milk and whole-milk products, asparagus, spinach, and certain meats, fish, and poultry.  Your doctor or nurse can talk to you about whether it makes sense to avoid or limit certain foods. It can also help to work with a dietitian (food expert). They can help you make sure that your body is getting the nutrients it needs.  What else should I know? -- Getting the right amount of calcium in your diet is important for healthy bones. But having too much or too little calcium can cause some types of kidney stones to form. Talk with your doctor, nurse, or dietitian about how much calcium you should have. Talk to them before taking calcium or vitamin D supplements.  Depending on your weight and health, it might help to try to lose weight. Keeping a healthy body weight can help prevent kidney stones.  All topics are updated as new evidence becomes available and our peer review process is complete.  This topic retrieved from  [employer] on: Mar 09, 2024.  Topic 674711 Version 2.0  Release: 32.2.4 - C32.67  © 2024 [employer] and/or its affiliates. All rights reserved.  figure 1: Anatomy of the urinary tract     Urine is made by the kidneys. It passes from the kidneys into the bladder through 2 tubes called the ureters. Then, it leaves the bladder through another tube called the urethra.  Graphic 13070 Version 8.0  Consumer Information Use and Disclaimer   Disclaimer: This generalized information is a limited summary of diagnosis, treatment, and/or medication information. It is not meant to be comprehensive and should be used as a tool to help the user understand and/or assess potential diagnostic and treatment options. It does NOT include all information about conditions, treatments, medications, side effects, or risks that may apply to a specific patient. It is not intended to be medical advice or a substitute for the medical advice, diagnosis, or treatment of a health care provider based on the health care provider's examination and assessment of a patient's specific and unique circumstances. Patients must speak with a health care provider for complete information about their health, medical questions, and treatment options, including any risks or benefits regarding use of medications. This information does not endorse any treatments or medications as safe, effective, or approved for treating a specific patient. Up[employer], Inc[employer] and its affiliates disclaim any warranty or liability relating to this information or the use thereof.The use of this information is governed by the Terms of Use, available at https://www.wolterskluwer.com/en/know/clinical-effectiveness-terms. 2024© UpToDate, Inc. and its affiliates and/or licensors. All rights reserved.  Copyright   © 2024 [employer], [employer] and/or its affiliates. All rights reserved.

## 2025-05-07 NOTE — ASSESSMENT & PLAN NOTE
Patient denies a known family history for prostate cancer  Patient's last PSA was performed 11/21/2024 and returned elevated a value of 4.1 with a free PSA percentage of 29%  MARIZOL deferred in the office today.  We discussed that the patient's PSA in November was found to be elevated.  We discussed that we should plan to repeat a PSA to determine if this is a transient elevation versus a persistent rise.  Additionally, we discussed potentially obtaining a multiparametric MRI of the prostate and the occurrence that the PSA returns elevated once more.  Patient will undergo repeat PSA testing within the next 2 to 4 weeks and our office will call for results.  If the patient's PSA returns persistently elevated then I would recommend proceeding with a multiparametric MRI of the prostate.  If an MRI of the prostate is pursued and the patient is graded as PI-RADS category 4 or 5 then my recommendation would be to proceed with an MRI fusion transperineal prostate biopsy.    Lab Results   Component Value Date    PSA 4.1 (H) 11/21/2024    PSA 2.8 05/20/2020    PSA 2.8 10/22/2018

## 2025-05-07 NOTE — ASSESSMENT & PLAN NOTE
Status post uteroscopy laser lithotripsy performed 2/27/2025.  Patient return to the office thereafter to undergo ureteral stent removal, which was completed without any complications.   ultrasound of the kidney and bladder performed 4/17/2025 revealed no further kidney stones bilaterally and resolution of the previously noted hydronephrosis.  Patient's stone returned as calcium oxalate  We discussed dietary modifications for kidney stone prevention.  2 handouts were provided to the patient today's office visit for kidney stone prevention.  We discussed potentially obtaining an ultrasound of the kidney and bladder in 1 year for continued surveillance.  However, the patient defers scheduling an ultrasound at this time.  Patient will follow-up as needed in this regard.

## 2025-06-10 DIAGNOSIS — E11.40 TYPE 2 DIABETES MELLITUS WITH DIABETIC NEUROPATHY, WITHOUT LONG-TERM CURRENT USE OF INSULIN (HCC): Primary | ICD-10-CM

## 2025-06-11 RX ORDER — BLOOD-GLUCOSE METER
KIT MISCELLANEOUS 2 TIMES DAILY
Qty: 100 STRIP | Refills: 5 | Status: SHIPPED | OUTPATIENT
Start: 2025-06-11

## 2025-07-24 DIAGNOSIS — E11.65 UNCONTROLLED TYPE 2 DIABETES MELLITUS WITH HYPERGLYCEMIA (HCC): ICD-10-CM

## 2025-07-29 RX ORDER — INSULIN LISPRO 100 [IU]/ML
INJECTION, SOLUTION INTRAVENOUS; SUBCUTANEOUS
Qty: 15 ML | Refills: 1 | Status: SHIPPED | OUTPATIENT
Start: 2025-07-29 | End: 2025-08-01 | Stop reason: SDUPTHER

## 2025-08-01 ENCOUNTER — OFFICE VISIT (OUTPATIENT)
Dept: ENDOCRINOLOGY | Facility: CLINIC | Age: 70
End: 2025-08-01
Payer: MEDICARE

## 2025-08-01 VITALS
WEIGHT: 237 LBS | SYSTOLIC BLOOD PRESSURE: 130 MMHG | BODY MASS INDEX: 33.93 KG/M2 | HEART RATE: 81 BPM | OXYGEN SATURATION: 96 % | DIASTOLIC BLOOD PRESSURE: 90 MMHG | HEIGHT: 70 IN

## 2025-08-01 DIAGNOSIS — Z79.4 TYPE 2 DIABETES MELLITUS WITH DIABETIC NEUROPATHY, WITH LONG-TERM CURRENT USE OF INSULIN (HCC): Primary | ICD-10-CM

## 2025-08-01 DIAGNOSIS — E11.65 UNCONTROLLED TYPE 2 DIABETES MELLITUS WITH HYPERGLYCEMIA (HCC): ICD-10-CM

## 2025-08-01 DIAGNOSIS — E11.40 TYPE 2 DIABETES MELLITUS WITH DIABETIC NEUROPATHY, WITH LONG-TERM CURRENT USE OF INSULIN (HCC): Primary | ICD-10-CM

## 2025-08-01 LAB — SL AMB POCT HEMOGLOBIN AIC: 11 (ref ?–6.5)

## 2025-08-01 PROCEDURE — G2211 COMPLEX E/M VISIT ADD ON: HCPCS | Performed by: INTERNAL MEDICINE

## 2025-08-01 PROCEDURE — 99214 OFFICE O/P EST MOD 30 MIN: CPT | Performed by: INTERNAL MEDICINE

## 2025-08-01 PROCEDURE — 83036 HEMOGLOBIN GLYCOSYLATED A1C: CPT | Performed by: INTERNAL MEDICINE

## 2025-08-01 RX ORDER — INSULIN LISPRO 100 [IU]/ML
INJECTION, SOLUTION INTRAVENOUS; SUBCUTANEOUS
Qty: 30 ML | Refills: 1 | Status: SHIPPED | OUTPATIENT
Start: 2025-08-01

## 2025-08-01 RX ORDER — INSULIN DEGLUDEC 100 U/ML
36 INJECTION, SOLUTION SUBCUTANEOUS
Qty: 30 ML | Refills: 1 | Status: SHIPPED | OUTPATIENT
Start: 2025-08-01

## (undated) DEVICE — SPECIMEN CONTAINER STERILE PEEL PACK

## (undated) DEVICE — GUIDEWIRE STRGHT TIP 0.035 IN  SOLO PLUS

## (undated) DEVICE — FIBER BALL TIP QUANTA 272 MICRON

## (undated) DEVICE — SYRINGE 10ML LL

## (undated) DEVICE — CATH URETERAL 5FR X 70 CM FLEX TIP POLYUR BARD

## (undated) DEVICE — PACK CUSTOM GU CYSTO PACK RF

## (undated) DEVICE — PACK TUR

## (undated) DEVICE — PREMIUM DRY TRAY LF: Brand: MEDLINE INDUSTRIES, INC.

## (undated) DEVICE — FIBER STD QUANTA 272 MICRON

## (undated) DEVICE — GLOVE SRG BIOGEL ECLIPSE 7

## (undated) DEVICE — SHEATH URETERAL ACCESS 11/13FR 45CM PROXIS

## (undated) DEVICE — BASKET SPECIMEN RETRIVAL 1.9FR 120CM

## (undated) DEVICE — CATH URET .038 10FR 50CM DUAL LUMEN